# Patient Record
Sex: MALE | NOT HISPANIC OR LATINO | Employment: FULL TIME | URBAN - METROPOLITAN AREA
[De-identification: names, ages, dates, MRNs, and addresses within clinical notes are randomized per-mention and may not be internally consistent; named-entity substitution may affect disease eponyms.]

---

## 2017-03-29 ENCOUNTER — OFFICE VISIT (OUTPATIENT)
Dept: FAMILY MEDICINE CLINIC | Facility: CLINIC | Age: 42
End: 2017-03-29

## 2017-03-29 VITALS
WEIGHT: 250 LBS | RESPIRATION RATE: 16 BRPM | BODY MASS INDEX: 41.65 KG/M2 | DIASTOLIC BLOOD PRESSURE: 90 MMHG | OXYGEN SATURATION: 93 % | SYSTOLIC BLOOD PRESSURE: 138 MMHG | TEMPERATURE: 97.4 F | HEART RATE: 85 BPM | HEIGHT: 65 IN

## 2017-03-29 DIAGNOSIS — M54.2 NECK PAIN: Primary | ICD-10-CM

## 2017-03-29 DIAGNOSIS — M89.8X1 PAIN IN SCAPULA: ICD-10-CM

## 2017-03-29 DIAGNOSIS — K21.9 GASTROESOPHAGEAL REFLUX DISEASE WITHOUT ESOPHAGITIS: ICD-10-CM

## 2017-03-29 PROCEDURE — 72050 X-RAY EXAM NECK SPINE 4/5VWS: CPT | Performed by: FAMILY MEDICINE

## 2017-03-29 PROCEDURE — 99214 OFFICE O/P EST MOD 30 MIN: CPT | Performed by: FAMILY MEDICINE

## 2017-03-29 RX ORDER — CYCLOBENZAPRINE HCL 10 MG
10 TABLET ORAL 3 TIMES DAILY PRN
Qty: 30 TABLET | Refills: 0 | Status: SHIPPED | OUTPATIENT
Start: 2017-03-29 | End: 2017-06-15

## 2017-03-29 RX ORDER — OMEPRAZOLE 40 MG/1
40 CAPSULE, DELAYED RELEASE ORAL DAILY
Qty: 90 CAPSULE | Refills: 1 | Status: SHIPPED | OUTPATIENT
Start: 2017-03-29 | End: 2017-08-16 | Stop reason: SDUPTHER

## 2017-03-29 RX ORDER — MELOXICAM 15 MG/1
15 TABLET ORAL DAILY
Qty: 30 TABLET | Refills: 0 | Status: SHIPPED | OUTPATIENT
Start: 2017-03-29 | End: 2017-06-15

## 2017-03-29 NOTE — PROGRESS NOTES
"Subjective   Jamie Suárez is a 41 y.o. male.     History of Present Illness   Chief Complaint:   Chief Complaint   Patient presents with   • Neck Pain       Jamie Suárez 41 y.o. male who presents today for neck pain that has been present for the past 5 days. Patient stated his pain is a 10 out of 10. Patient denies injury. He says the pain radiates down his left arm. He was seen at the [facility] 5 days ago and he was told it was a nerve in his elbow and prescribed a steroid pack. He finished the steroid pack this morning. Not much help. Pain more left scapula and going down left arm to elbow.  No tenderness left elbow, it is at left scapula.   he has a history of   Patient Active Problem List   Diagnosis   • Disorder of rotator cuff   • Shoulder pain   .  Since the last visit, he has overall felt well.  he has been compliant with   Current Outpatient Prescriptions:   •  amoxicillin (AMOXIL) 875 MG tablet, Take 1 tablet by mouth 2 (two) times a day. (Patient not taking: Reported on 3/24/2017), Disp: 20 tablet, Rfl: 0  •  Codeine Polt-Chlorphen Polt ER (TUZISTRA XR) 14.7-2.8 MG/5ML Suspension Extended Release, Take 10 mL by mouth 2 (two) times a day. (Patient not taking: Reported on 3/24/2017), Disp: 180 mL, Rfl: 0  •  fexofenadine (ALLEGRA) 180 MG tablet, Take 1 tablet by mouth daily. (Patient not taking: Reported on 3/24/2017), Disp: 30 tablet, Rfl: 0  •  MethylPREDNISolone (MEDROL) 4 MG tablet, Take as directed on package instructions., Disp: 21 tablet, Rfl: 0  •  omeprazole (PriLOSEC) 40 MG capsule, TAKE ONE CAPSULE BY MOUTH EVERY DAY, Disp: 30 capsule, Rfl: 0.  he denies medication side effects.    All of the chronic condition(s) listed above are stable w/o issues.    /90  Pulse 85  Temp 97.4 °F (36.3 °C) (Oral)   Resp 16  Ht 65\" (165.1 cm)  Wt 250 lb (113 kg)  SpO2 93%  BMI 41.6 kg/m2    No results found for this or any previous visit.      The following portions of the patient's history were " reviewed and updated as appropriate: allergies, current medications, past family history, past medical history, past social history, past surgical history and problem list.    Review of Systems   Constitutional: Negative for activity change, appetite change and unexpected weight change.   Eyes: Negative for visual disturbance.   Respiratory: Negative for chest tightness and shortness of breath.    Cardiovascular: Negative for chest pain and palpitations.   Musculoskeletal: Positive for neck pain.   Skin: Negative for color change.   Neurological: Negative for syncope and speech difficulty.   Psychiatric/Behavioral: Negative for confusion and decreased concentration.       Objective   Physical Exam   Constitutional: He is oriented to person, place, and time. He appears well-developed and well-nourished.   HENT:   Head: Atraumatic.   Mouth/Throat: Oropharynx is clear and moist.   Eyes: EOM are normal. Pupils are equal, round, and reactive to light.   Neck: Normal range of motion. Neck supple. No thyromegaly present.    Tender left scapula   Cardiovascular: Normal rate and regular rhythm.    Pulmonary/Chest: Effort normal and breath sounds normal.   Abdominal: Soft.   Musculoskeletal: Normal range of motion.   Neurological: He is alert and oriented to person, place, and time.   Skin: Skin is warm and dry.   Psychiatric: He has a normal mood and affect. His behavior is normal.   Nursing note and vitals reviewed.    Views: lateral    Relevant Clinical Issues/Diagnoses/Indications for XRay: see HPI  Clinical Findings: C-spine: Normal    Compared with previous XRay? No comparison or previous xray was looked at today    Date of Previous Xray:n/a    Changes on current Xray? No comparison or previous xray was looked at today    Assessment/Plan   Jamie was seen today for neck pain.    Diagnoses and all orders for this visit:    Neck pain  -     cyclobenzaprine (FLEXERIL) 10 MG tablet; Take 1 tablet by mouth 3 (Three) Times a  Day As Needed for Muscle Spasms.  -     XR Spine Cervical Complete 4 or 5 View (In Office)    Pain in scapula    Other orders  -     meloxicam (MOBIC) 15 MG tablet; Take 1 tablet by mouth Daily.

## 2017-04-05 ENCOUNTER — OFFICE VISIT (OUTPATIENT)
Dept: FAMILY MEDICINE CLINIC | Facility: CLINIC | Age: 42
End: 2017-04-05

## 2017-04-05 VITALS
WEIGHT: 250 LBS | HEART RATE: 101 BPM | SYSTOLIC BLOOD PRESSURE: 130 MMHG | HEIGHT: 65 IN | TEMPERATURE: 97.8 F | OXYGEN SATURATION: 98 % | RESPIRATION RATE: 16 BRPM | DIASTOLIC BLOOD PRESSURE: 86 MMHG | BODY MASS INDEX: 41.65 KG/M2

## 2017-04-05 DIAGNOSIS — M25.512 ACUTE PAIN OF LEFT SHOULDER: Primary | ICD-10-CM

## 2017-04-05 PROCEDURE — 99214 OFFICE O/P EST MOD 30 MIN: CPT | Performed by: FAMILY MEDICINE

## 2017-04-05 RX ORDER — HYDROCODONE BITARTRATE AND ACETAMINOPHEN 5; 325 MG/1; MG/1
TABLET ORAL
Refills: 0 | COMMUNITY
Start: 2017-03-31 | End: 2017-06-15

## 2017-04-05 RX ORDER — PREDNISONE 50 MG/1
50 TABLET ORAL DAILY
COMMUNITY
End: 2017-06-15

## 2017-04-05 RX ORDER — DIAZEPAM 5 MG/1
TABLET ORAL
Refills: 0 | COMMUNITY
Start: 2017-03-31 | End: 2017-06-15

## 2017-04-05 NOTE — PATIENT INSTRUCTIONS
Exercise 30 minutes most days of the week  Sleep 6-8 hours each night if possible  Low fat, low cholesterol diet   we discussed prescribed medications and how to take them   make sure you get results of any labs/studies ordered today  Low glycemic index diet  GET IN TO SEE PT   SEE NEUROSURGEON IN AM   SEEING DR TOBI DODD DOWNWJULIO IN AM

## 2017-04-05 NOTE — PROGRESS NOTES
"Subjective   Jamie Suárez is a 41 y.o. male.     History of Present Illness   Chief Complaint:   Chief Complaint   Patient presents with   • Shoulder Pain       Jamie Suárez 41 y.o. male who presents today following up for shoulder pain that radiates to his left elbow. His pain today is a 6 out of 10. He went to [place] [facility] on 04/02/2017 for this issue because the pain became unbearable. I reviewed all notes, labs and test results. He went to PT and was informed they did not take his insurance. He is going to call his insurance to find what office will accept his insurance. His blood pressure is elevated in the office: 154/105 and 150/103. See new bp    130/86. In er got valium, hydrocodone, prednisone. Pain really left scapula.   he has a history of   Patient Active Problem List   Diagnosis   • Disorder of rotator cuff   • Shoulder pain   • GERD (gastroesophageal reflux disease)   .  Since the last visit, he has overall felt well.  he has been compliant with   Current Outpatient Prescriptions:   •  cyclobenzaprine (FLEXERIL) 10 MG tablet, Take 1 tablet by mouth 3 (Three) Times a Day As Needed for Muscle Spasms., Disp: 30 tablet, Rfl: 0  •  diazePAM (VALIUM) 5 MG tablet, TAKE 1 TABLET BY MOUTH EVERY 6 HOURS AS NEEDED MUSCLE SPASMS, Disp: , Rfl: 0  •  HYDROcodone-acetaminophen (NORCO) 5-325 MG per tablet, TAKE 1 TABLET BY MOUTH EVERY 6 HOURS AS NEEDED FOR PAIN, Disp: , Rfl: 0  •  meloxicam (MOBIC) 15 MG tablet, Take 1 tablet by mouth Daily., Disp: 30 tablet, Rfl: 0  •  omeprazole (priLOSEC) 40 MG capsule, Take 1 capsule by mouth Daily., Disp: 90 capsule, Rfl: 1.  he denies medication side effects.    All of the chronic condition(s) listed above are stable w/o issues.    BP (!) 154/105  Pulse 101  Temp 97.8 °F (36.6 °C) (Oral)   Resp 16  Ht 65\" (165.1 cm)  Wt 250 lb (113 kg)  SpO2 98%  BMI 41.6 kg/m2    No results found for this or any previous visit.      The following portions of the patient's history were " reviewed and updated as appropriate: allergies, current medications, past family history, past medical history, past social history, past surgical history and problem list.    Review of Systems   Constitutional: Negative for activity change, appetite change and unexpected weight change.   Eyes: Negative for visual disturbance.   Respiratory: Negative for chest tightness and shortness of breath.    Cardiovascular: Negative for chest pain and palpitations.   Musculoskeletal:        Left shoulder pain   Skin: Negative for color change.   Neurological: Negative for syncope and speech difficulty.   Psychiatric/Behavioral: Negative for confusion and decreased concentration.       Objective   Physical Exam   Constitutional: He appears well-developed and well-nourished.   HENT:   Head: Normocephalic.   Eyes: Pupils are equal, round, and reactive to light.   Neck: Normal range of motion.   Cardiovascular: Normal rate, regular rhythm, normal heart sounds and intact distal pulses.    Pulmonary/Chest: Breath sounds normal.   Musculoskeletal:    Left shoulder  FROM   PAIN ALL LEFT SCAPULA. GOOD    Neurological: He is alert.   Psychiatric: He has a normal mood and affect. His behavior is normal.   Nursing note and vitals reviewed.      Assessment/Plan   Jamie was seen today for shoulder pain.    Diagnoses and all orders for this visit:    Acute pain of left shoulder

## 2017-06-15 ENCOUNTER — OFFICE VISIT (OUTPATIENT)
Dept: RETAIL CLINIC | Facility: CLINIC | Age: 42
End: 2017-06-15

## 2017-06-15 VITALS — HEART RATE: 89 BPM | TEMPERATURE: 98 F | OXYGEN SATURATION: 98 % | RESPIRATION RATE: 20 BRPM

## 2017-06-15 DIAGNOSIS — R21 RASH: Primary | ICD-10-CM

## 2017-06-15 PROCEDURE — 99213 OFFICE O/P EST LOW 20 MIN: CPT | Performed by: NURSE PRACTITIONER

## 2017-06-15 RX ORDER — PREDNISONE 20 MG/1
20 TABLET ORAL 2 TIMES DAILY
Qty: 10 TABLET | Refills: 0 | Status: SHIPPED | OUTPATIENT
Start: 2017-06-15 | End: 2017-10-31

## 2017-06-15 NOTE — PROGRESS NOTES
Subjective:     Jamie Suárez is a 41 y.o.     Insect Bite   This is a new problem. The current episode started in the past 7 days (went to sleep and woke up with rash on right arm 3 days ago). The problem has been unchanged. Pertinent negatives include no chills, congestion, coughing, fatigue, fever, headaches, joint swelling, myalgias, nausea, sore throat or vomiting. Rash: right arm, woke up with it 3 days ago, does not itch and has not spread or chenged in any way. He has tried nothing for the symptoms.         The following portions of the patient's history were reviewed and updated as appropriate: allergies, current medications, past family history, past medical history, past social history, past surgical history and problem list.      Review of Systems   Constitutional: Negative for chills, fatigue and fever.   HENT: Negative for congestion, postnasal drip, rhinorrhea and sore throat.    Respiratory: Negative for cough, shortness of breath and wheezing.    Cardiovascular: Negative.    Gastrointestinal: Negative for diarrhea, nausea and vomiting.   Musculoskeletal: Negative for joint swelling and myalgias.   Skin: Negative for pallor. Rash: right arm, woke up with it 3 days ago, does not itch and has not spread or chenged in any way.   Neurological: Negative for dizziness, light-headedness and headaches.         Objective:      Physical Exam   Constitutional: He is oriented to person, place, and time. He appears well-developed and well-nourished. He is cooperative.   HENT:   Head: Normocephalic and atraumatic.   Nose: No rhinorrhea.   Eyes: Conjunctivae, EOM and lids are normal. Pupils are equal, round, and reactive to light.   Neck: Normal range of motion. Neck supple.   Cardiovascular: Normal rate, regular rhythm, S1 normal, S2 normal and normal heart sounds.    Pulmonary/Chest: Effort normal and breath sounds normal.   Abdominal: Soft. Bowel sounds are normal. There is no tenderness.   Musculoskeletal:  Normal range of motion.   Lymphadenopathy:     He has no cervical adenopathy.   Neurological: He is alert and oriented to person, place, and time.   Skin: Skin is warm and dry. Rash (right upper extremitiy, linear ) noted. Rash is maculopapular.   Psychiatric: He has a normal mood and affect. His behavior is normal.   Vitals reviewed.          Jamie was seen today for insect bite.    Diagnoses and all orders for this visit:    Rash    Other orders  -     predniSONE (DELTASONE) 20 MG tablet; Take 1 tablet by mouth 2 (Two) Times a Day.

## 2017-06-15 NOTE — PATIENT INSTRUCTIONS
Insect Bite  Mosquitoes, flies, fleas, bedbugs, and many other insects can bite. Insect bites are different from insect stings. A sting is when poison (venom) is injected into the skin. Insect bites can cause pain or itching for a few days, but they are usually not serious. Some insects can spread diseases to people through a bite.  SYMPTOMS   Symptoms of an insect bite include:  · Itching or pain in the bite area.  · Redness and swelling in the bite area.  · An open wound (skin ulcer).  In many cases, symptoms last for 2-4 days.   DIAGNOSIS   This condition is usually diagnosed based on symptoms and a physical exam.  TREATMENT   Treatment is usually not needed for an insect bite. Symptoms often go away on their own. Your health care provider may recommend creams or lotions to help reduce itching. Antibiotic medicines may be prescribed if the bite becomes infected. A tetanus shot may be given in some cases. If you develop an allergic reaction to an insect bite, your health care provider will prescribe medicines to treat the reaction (antihistamines). This is rare.  HOME CARE INSTRUCTIONS  · Do not scratch the bite area.  · Keep the bite area clean and dry. Wash the bite area daily with soap and water as told by your health care provider.  · If directed, apply ice to the bite area.    Put ice in a plastic bag.    Place a towel between your skin and the bag.    Leave the ice on for 20 minutes, 2-3 times per day.  · To help reduce itching and swelling, try applying a baking soda paste, cortisone cream, or calamine lotion to the bite area as told by your health care provider.  · Apply or take over-the-counter and prescription medicines only as told by your health care provider.  · If you were prescribed an antibiotic medicine, use it as told by your health care provider. Do not stop using the antibiotic even if your condition improves.  · Keep all follow-up visits as told by your health care provider. This is  important.  PREVENTION   · Use insect repellent. The best insect repellents contain:    DEET, picaridin, oil of lemon eucalyptus (OLE), or DT1335.    Higher amounts of an active ingredient.  · When you are outdoors, wear clothing that covers your arms and legs.  · Avoid opening windows that do not have window screens.  SEEK MEDICAL CARE IF:  · You have increased redness, swelling, or pain in the bite area.  · You have a fever.  SEEK IMMEDIATE MEDICAL CARE IF:   · You have joint pain.    · You have fluid, blood, or pus coming from the bite area.  · You have a headache or neck pain.  · You have unusual weakness.  · You have a rash.  · You have chest pain or shortness of breath.  · You have abdominal pain, nausea, or vomiting.  · You feel unusually tired or sleepy.     This information is not intended to replace advice given to you by your health care provider. Make sure you discuss any questions you have with your health care provider.     Document Released: 01/25/2006 Document Revised: 04/10/2017 Document Reviewed: 05/04/2016  [employer] Interactive Patient Education ©2017 [employer]

## 2017-08-16 RX ORDER — OMEPRAZOLE 40 MG/1
CAPSULE, DELAYED RELEASE ORAL
Qty: 30 CAPSULE | Refills: 0 | Status: SHIPPED | OUTPATIENT
Start: 2017-08-16 | End: 2017-09-16 | Stop reason: SDUPTHER

## 2017-09-18 RX ORDER — OMEPRAZOLE 40 MG/1
CAPSULE, DELAYED RELEASE ORAL
Qty: 15 CAPSULE | Refills: 0 | Status: SHIPPED | OUTPATIENT
Start: 2017-09-18 | End: 2017-10-31 | Stop reason: SDUPTHER

## 2017-10-06 ENCOUNTER — APPOINTMENT (OUTPATIENT)
Dept: GENERAL RADIOLOGY | Facility: HOSPITAL | Age: 42
End: 2017-10-06

## 2017-10-06 PROCEDURE — 71020 XR CHEST 2 VW: CPT | Performed by: FAMILY MEDICINE

## 2017-10-06 PROCEDURE — 71020 HC CHEST PA AND LATERAL: CPT | Performed by: FAMILY MEDICINE

## 2017-10-30 RX ORDER — OMEPRAZOLE 40 MG/1
CAPSULE, DELAYED RELEASE ORAL
Qty: 30 CAPSULE | Refills: 0 | OUTPATIENT
Start: 2017-10-30

## 2017-10-31 ENCOUNTER — OFFICE VISIT (OUTPATIENT)
Dept: FAMILY MEDICINE CLINIC | Facility: CLINIC | Age: 42
End: 2017-10-31

## 2017-10-31 VITALS
WEIGHT: 256 LBS | BODY MASS INDEX: 41.14 KG/M2 | DIASTOLIC BLOOD PRESSURE: 90 MMHG | HEIGHT: 66 IN | TEMPERATURE: 97.4 F | SYSTOLIC BLOOD PRESSURE: 120 MMHG | RESPIRATION RATE: 16 BRPM | HEART RATE: 90 BPM | OXYGEN SATURATION: 98 %

## 2017-10-31 DIAGNOSIS — IMO0001 CLASS 3 OBESITY DUE TO EXCESS CALORIES WITHOUT SERIOUS COMORBIDITY WITH BODY MASS INDEX (BMI) OF 40.0 TO 44.9 IN ADULT: ICD-10-CM

## 2017-10-31 DIAGNOSIS — K21.9 GASTROESOPHAGEAL REFLUX DISEASE WITHOUT ESOPHAGITIS: Primary | ICD-10-CM

## 2017-10-31 PROBLEM — M54.12 LEFT CERVICAL RADICULOPATHY: Status: ACTIVE | Noted: 2017-04-14

## 2017-10-31 PROBLEM — M48.02 CERVICAL STENOSIS OF SPINAL CANAL: Status: ACTIVE | Noted: 2017-04-14

## 2017-10-31 PROCEDURE — 99214 OFFICE O/P EST MOD 30 MIN: CPT | Performed by: FAMILY MEDICINE

## 2017-10-31 RX ORDER — OMEPRAZOLE 40 MG/1
40 CAPSULE, DELAYED RELEASE ORAL DAILY
Qty: 90 CAPSULE | Refills: 1 | Status: SHIPPED | OUTPATIENT
Start: 2017-10-31 | End: 2018-05-14 | Stop reason: SDUPTHER

## 2017-10-31 NOTE — PROGRESS NOTES
"Subjective   Jamie Suárez is a 42 y.o. male.     History of Present Illness   Chief Complaint:   Chief Complaint   Patient presents with   • Heartburn       Jamie Suárez 42 y.o. male who presents today for Medical Management of the below listed issues and medication refills. He has gerd and takes  prilosec 40mg q day. For 5 years and discussed need for gi consult for upper endoscopy. Discussed weight loss and he wants to try contrave. Discussed weight loss with exercise.  See bp.  he has a problem list of   Patient Active Problem List   Diagnosis   • Disorder of rotator cuff   • Shoulder pain   • GERD (gastroesophageal reflux disease)   .  Since the last visit, he has overall felt well.  he has been compliant with   Current Outpatient Prescriptions:   •  albuterol (PROVENTIL HFA;VENTOLIN HFA) 108 (90 Base) MCG/ACT inhaler, Inhale 2 puffs Every 6 (Six) Hours As Needed for Wheezing., Disp: 6.7 g, Rfl: 0  •  omeprazole (priLOSEC) 40 MG capsule, TAKE ONE CAPSULE EVERY DAY, Disp: 15 capsule, Rfl: 0.  he denies medication side effects.    All of the chronic condition(s) listed above are stable w/o issues.    /91  Pulse 90  Temp 97.4 °F (36.3 °C) (Oral)   Resp 16  Ht 66\" (167.6 cm)  Wt 256 lb (116 kg)  SpO2 98%  BMI 41.32 kg/m2    No results found for this or any previous visit.    The following portions of the patient's history were reviewed and updated as appropriate: allergies, current medications, past family history, past medical history, past social history, past surgical history and problem list.    Review of Systems   Constitutional: Negative for activity change, appetite change and unexpected weight change.   Eyes: Negative for visual disturbance.   Respiratory: Negative for chest tightness and shortness of breath.    Cardiovascular: Negative for chest pain and palpitations.   Skin: Negative for color change.   Neurological: Negative for syncope and speech difficulty.   Psychiatric/Behavioral: " Negative for confusion and decreased concentration.       Objective   Physical Exam   Constitutional: He is oriented to person, place, and time. He appears well-developed and well-nourished.   HENT:   Head: Atraumatic.   Mouth/Throat: Oropharynx is clear and moist.   Eyes: EOM are normal. Pupils are equal, round, and reactive to light.   Neck: Normal range of motion. Neck supple. No thyromegaly present.   Cardiovascular: Normal rate and regular rhythm.    Pulmonary/Chest: Effort normal and breath sounds normal.   Abdominal: Soft.   Musculoskeletal: Normal range of motion.   Neurological: He is alert and oriented to person, place, and time.   Skin: Skin is warm and dry.   Psychiatric: He has a normal mood and affect. His behavior is normal.   Nursing note and vitals reviewed.      Assessment/Plan   Jamie was seen today for heartburn.    Diagnoses and all orders for this visit:    Gastroesophageal reflux disease without esophagitis  -     omeprazole (priLOSEC) 40 MG capsule; Take 1 capsule by mouth Daily.  -     Comprehensive metabolic panel  -     Lipid panel  -     CBC and Differential  -     TSH    Class 3 obesity due to excess calories without serious comorbidity with body mass index (BMI) of 40.0 to 44.9 in adult  -     Comprehensive metabolic panel  -     Lipid panel  -     CBC and Differential  -     TSH    Other orders  -     naltrexone-bupropion ER (CONTRAVE) 8-90 MG tablet; Wk 1: 1 tab daily, Wk 2: 1 tab twice a day, Wk 3: 2 tabs in AM, 1 tab in PM, Wk 4: 2 tabs twice a day, Maintenance dose: 2 tabs twice daily.

## 2017-11-01 LAB
ALBUMIN SERPL-MCNC: 4.3 G/DL (ref 3.5–5.2)
ALBUMIN/GLOB SERPL: 1.3 G/DL
ALP SERPL-CCNC: 62 U/L (ref 39–117)
ALT SERPL-CCNC: 55 U/L (ref 1–41)
AST SERPL-CCNC: 33 U/L (ref 1–40)
BASOPHILS # BLD AUTO: 0.04 10*3/MM3 (ref 0–0.2)
BASOPHILS NFR BLD AUTO: 0.4 % (ref 0–1.5)
BILIRUB SERPL-MCNC: 0.5 MG/DL (ref 0.1–1.2)
BUN SERPL-MCNC: 14 MG/DL (ref 6–20)
BUN/CREAT SERPL: 13.1 (ref 7–25)
CALCIUM SERPL-MCNC: 9.5 MG/DL (ref 8.6–10.5)
CHLORIDE SERPL-SCNC: 101 MMOL/L (ref 98–107)
CHOLEST SERPL-MCNC: 196 MG/DL (ref 0–200)
CO2 SERPL-SCNC: 27.5 MMOL/L (ref 22–29)
CREAT SERPL-MCNC: 1.07 MG/DL (ref 0.76–1.27)
EOSINOPHIL # BLD AUTO: 0.18 10*3/MM3 (ref 0–0.7)
EOSINOPHIL NFR BLD AUTO: 1.9 % (ref 0.3–6.2)
ERYTHROCYTE [DISTWIDTH] IN BLOOD BY AUTOMATED COUNT: 14 % (ref 11.5–14.5)
GFR SERPLBLD CREATININE-BSD FMLA CKD-EPI: 75 ML/MIN/1.73
GFR SERPLBLD CREATININE-BSD FMLA CKD-EPI: 91 ML/MIN/1.73
GLOBULIN SER CALC-MCNC: 3.3 GM/DL
GLUCOSE SERPL-MCNC: 109 MG/DL (ref 65–99)
HCT VFR BLD AUTO: 53.1 % (ref 40.4–52.2)
HDLC SERPL-MCNC: 45 MG/DL (ref 40–60)
HGB BLD-MCNC: 18 G/DL (ref 13.7–17.6)
IMM GRANULOCYTES # BLD: 0.04 10*3/MM3 (ref 0–0.03)
IMM GRANULOCYTES NFR BLD: 0.4 % (ref 0–0.5)
LDLC SERPL CALC-MCNC: 117 MG/DL (ref 0–100)
LYMPHOCYTES # BLD AUTO: 3.07 10*3/MM3 (ref 0.9–4.8)
LYMPHOCYTES NFR BLD AUTO: 31.6 % (ref 19.6–45.3)
MCH RBC QN AUTO: 32.4 PG (ref 27–32.7)
MCHC RBC AUTO-ENTMCNC: 33.9 G/DL (ref 32.6–36.4)
MCV RBC AUTO: 95.7 FL (ref 79.8–96.2)
MONOCYTES # BLD AUTO: 0.9 10*3/MM3 (ref 0.2–1.2)
MONOCYTES NFR BLD AUTO: 9.3 % (ref 5–12)
NEUTROPHILS # BLD AUTO: 5.48 10*3/MM3 (ref 1.9–8.1)
NEUTROPHILS NFR BLD AUTO: 56.4 % (ref 42.7–76)
PLATELET # BLD AUTO: 253 10*3/MM3 (ref 140–500)
POTASSIUM SERPL-SCNC: 4.5 MMOL/L (ref 3.5–5.2)
PROT SERPL-MCNC: 7.6 G/DL (ref 6–8.5)
RBC # BLD AUTO: 5.55 10*6/MM3 (ref 4.6–6)
SODIUM SERPL-SCNC: 141 MMOL/L (ref 136–145)
TRIGL SERPL-MCNC: 172 MG/DL (ref 0–150)
TSH SERPL DL<=0.005 MIU/L-ACNC: 1.66 MIU/ML (ref 0.27–4.2)
VLDLC SERPL CALC-MCNC: 34.4 MG/DL (ref 5–40)
WBC # BLD AUTO: 9.71 10*3/MM3 (ref 4.5–10.7)

## 2018-03-02 ENCOUNTER — APPOINTMENT (OUTPATIENT)
Dept: GENERAL RADIOLOGY | Facility: HOSPITAL | Age: 43
End: 2018-03-02

## 2018-03-02 PROCEDURE — 71046 X-RAY EXAM CHEST 2 VIEWS: CPT | Performed by: EMERGENCY MEDICINE

## 2018-03-05 ENCOUNTER — OFFICE VISIT (OUTPATIENT)
Dept: FAMILY MEDICINE CLINIC | Facility: CLINIC | Age: 43
End: 2018-03-05

## 2018-03-05 VITALS
TEMPERATURE: 98.1 F | OXYGEN SATURATION: 98 % | RESPIRATION RATE: 16 BRPM | HEIGHT: 66 IN | WEIGHT: 248 LBS | BODY MASS INDEX: 39.86 KG/M2 | DIASTOLIC BLOOD PRESSURE: 102 MMHG | SYSTOLIC BLOOD PRESSURE: 142 MMHG | HEART RATE: 114 BPM

## 2018-03-05 DIAGNOSIS — J18.9 PNEUMONIA OF RIGHT MIDDLE LOBE DUE TO INFECTIOUS ORGANISM: Primary | ICD-10-CM

## 2018-03-05 DIAGNOSIS — B34.9 VIRAL SYNDROME: ICD-10-CM

## 2018-03-05 PROCEDURE — 99214 OFFICE O/P EST MOD 30 MIN: CPT | Performed by: FAMILY MEDICINE

## 2018-03-05 NOTE — PROGRESS NOTES
"Subjective   Jamie Suárez is a 42 y.o. male.     History of Present Illness   Chief Complaint:   Chief Complaint   Patient presents with   • Pneumonia     Urgent care follow up       Jamie Suárez 42 y.o. male who presents today for an Urgent care follow up on 03/02/2018 for Pneumonia of right middle lobe due to infectious organism and acute bronchospasms.  I reviewed all labs, tests and notes. Patient is currently taking Levofloxacin 500 mg.  cxr 2/2018 showed pnuemonia right and cxr  Clear 3/2/2018  Still feels congested and on levaquin.  Feels aches all over, finish levaquin.  See me Friday.  Off work until 3/13/2018/  .  he has a problem list of   Patient Active Problem List   Diagnosis   • Disorder of rotator cuff   • Shoulder pain   • GERD (gastroesophageal reflux disease)   • Cervical stenosis of spinal canal   • Left cervical radiculopathy   .  Since the last visit, he has overall felt well.  he has been compliant with   Current Outpatient Prescriptions:   •  albuterol (PROVENTIL HFA;VENTOLIN HFA) 108 (90 Base) MCG/ACT inhaler, Inhale 2 puffs Every 6 (Six) Hours As Needed for Wheezing., Disp: 1 inhaler, Rfl: 0  •  levoFLOXacin (LEVAQUIN) 500 MG tablet, Take 1 tablet by mouth Daily., Disp: 10 tablet, Rfl: 0  •  omeprazole (priLOSEC) 40 MG capsule, Take 1 capsule by mouth Daily., Disp: 90 capsule, Rfl: 1  •  promethazine-dextromethorphan (PROMETHAZINE-DM) 6.25-15 MG/5ML syrup, Take 5 mL by mouth 4 (Four) Times a Day As Needed for Cough., Disp: 100 mL, Rfl: 0.  he denies medication side effects.    All of the chronic condition(s) listed above are stable w/o issues.    BP (!) 142/102  Pulse 114  Temp 98.1 °F (36.7 °C) (Oral)   Resp 16  Ht 167.6 cm (66\")  Wt 112 kg (248 lb)  SpO2 98%  BMI 40.03 kg/m2    The following portions of the patient's history were reviewed and updated as appropriate: allergies, current medications, past family history, past medical history, past social history, past surgical " history and problem list.    Review of Systems   Constitutional: Negative for activity change, appetite change, chills, fatigue, fever and unexpected weight change.   HENT: Positive for congestion, sinus pain and sinus pressure. Negative for ear pain, hearing loss, mouth sores, nosebleeds, rhinorrhea and sore throat.    Eyes: Negative for pain and visual disturbance.   Respiratory: Positive for cough. Negative for shortness of breath and wheezing.    Cardiovascular: Negative for chest pain, palpitations and leg swelling.   Gastrointestinal: Negative for abdominal distention, abdominal pain, blood in stool, constipation, diarrhea, nausea and vomiting.   Endocrine: Negative for cold intolerance and heat intolerance.   Genitourinary: Negative for difficulty urinating, discharge, dysuria, frequency, hematuria and urgency.   Musculoskeletal: Negative for back pain and joint swelling.   Skin: Negative for rash and wound.   Neurological: Negative for dizziness, weakness, numbness and headaches.   Hematological: Does not bruise/bleed easily.   Psychiatric/Behavioral: Negative for confusion, dysphoric mood, sleep disturbance and suicidal ideas. The patient is not nervous/anxious.        Objective   Physical Exam   Constitutional: He is oriented to person, place, and time. He appears well-developed and well-nourished.   HENT:   Head: Normocephalic.   Right Ear: External ear normal.   Left Ear: External ear normal.   Nose: Nose normal.   Mouth/Throat: Oropharynx is clear and moist.   Eyes: EOM are normal. Pupils are equal, round, and reactive to light.   Neck: Normal range of motion. Neck supple.   Cardiovascular: Normal rate and regular rhythm.    Pulmonary/Chest: Effort normal. No respiratory distress. He has wheezes. He has no rales. He exhibits no tenderness.   Abdominal: Soft.   Musculoskeletal: Normal range of motion.   Lymphadenopathy:     He has no cervical adenopathy.   Neurological: He is oriented to person, place,  and time.   Skin: Skin is warm and dry.   Psychiatric: He has a normal mood and affect. His behavior is normal.   Nursing note and vitals reviewed.      Assessment/Plan   Jamie was seen today for pneumonia.    Diagnoses and all orders for this visit:    Pneumonia of right middle lobe due to infectious organism    Viral syndrome

## 2018-03-07 ENCOUNTER — TELEPHONE (OUTPATIENT)
Dept: FAMILY MEDICINE CLINIC | Facility: CLINIC | Age: 43
End: 2018-03-07

## 2018-03-09 ENCOUNTER — OFFICE VISIT (OUTPATIENT)
Dept: FAMILY MEDICINE CLINIC | Facility: CLINIC | Age: 43
End: 2018-03-09

## 2018-03-09 ENCOUNTER — DOCUMENTATION (OUTPATIENT)
Dept: FAMILY MEDICINE CLINIC | Facility: CLINIC | Age: 43
End: 2018-03-09

## 2018-03-09 VITALS
BODY MASS INDEX: 39.86 KG/M2 | HEIGHT: 66 IN | OXYGEN SATURATION: 98 % | TEMPERATURE: 97.3 F | SYSTOLIC BLOOD PRESSURE: 128 MMHG | WEIGHT: 248 LBS | RESPIRATION RATE: 16 BRPM | DIASTOLIC BLOOD PRESSURE: 77 MMHG | HEART RATE: 99 BPM

## 2018-03-09 DIAGNOSIS — J40 BRONCHITIS: Primary | ICD-10-CM

## 2018-03-09 DIAGNOSIS — R91.8 PULMONARY INFILTRATE IN RIGHT LUNG ON CHEST X-RAY: ICD-10-CM

## 2018-03-09 PROCEDURE — 99213 OFFICE O/P EST LOW 20 MIN: CPT | Performed by: FAMILY MEDICINE

## 2018-03-09 RX ORDER — FINASTERIDE 5 MG/1
TABLET, FILM COATED ORAL
COMMUNITY
Start: 2018-03-06 | End: 2018-12-18

## 2018-03-09 NOTE — PROGRESS NOTES
"Subjective   Jamie Suárez is a 42 y.o. male.     History of Present Illness   Chief Complaint:   Chief Complaint   Patient presents with   • Pneumonia     5 day follow up       Jamie Suárez 42 y.o. male who presents today for a 5 day follow up regarding pneumonia of right middle lobe due to infectious organism and acute bronchospasms. He has 2 more tablets of his Levaquin.  He still has congestion and is coughing. He stated he is feeling a little better. Much better   cxr cleared   Felling better. Pt is able to go back to on 3/17/18   Phenergan with codiene helped  Cough.   Return to work 3/17/18.  he has a problem list of   Patient Active Problem List   Diagnosis   • Disorder of rotator cuff   • Shoulder pain   • GERD (gastroesophageal reflux disease)   • Cervical stenosis of spinal canal   • Left cervical radiculopathy   .  Since the last visit, he has overall felt well.  he has been compliant with   Current Outpatient Prescriptions:   •  albuterol (PROVENTIL HFA;VENTOLIN HFA) 108 (90 Base) MCG/ACT inhaler, Inhale 2 puffs Every 6 (Six) Hours As Needed for Wheezing., Disp: 1 inhaler, Rfl: 0  •  finasteride (PROSCAR) 5 MG tablet, , Disp: , Rfl:   •  levoFLOXacin (LEVAQUIN) 500 MG tablet, Take 1 tablet by mouth Daily., Disp: 10 tablet, Rfl: 0  •  omeprazole (priLOSEC) 40 MG capsule, Take 1 capsule by mouth Daily., Disp: 90 capsule, Rfl: 1  •  promethazine-dextromethorphan (PROMETHAZINE-DM) 6.25-15 MG/5ML syrup, Take 5 mL by mouth 4 (Four) Times a Day As Needed for Cough., Disp: 100 mL, Rfl: 0.  he denies medication side effects.    All of the chronic condition(s) listed above are stable w/o issues.    /77  Pulse 99  Temp 97.3 °F (36.3 °C) (Oral)   Resp 16  Ht 167.6 cm (66\")  Wt 112 kg (248 lb)  SpO2 98%  BMI 40.03 kg/m2      The following portions of the patient's history were reviewed and updated as appropriate: allergies, current medications, past family history, past medical history, past social " history, past surgical history and problem list.    Review of Systems   Constitutional: Negative for activity change, appetite change, chills, fatigue, fever and unexpected weight change.   HENT: Positive for congestion. Negative for ear pain, hearing loss, mouth sores, nosebleeds, rhinorrhea and sore throat.    Eyes: Negative for pain and visual disturbance.   Respiratory: Positive for cough. Negative for shortness of breath and wheezing.    Cardiovascular: Negative for chest pain, palpitations and leg swelling.   Gastrointestinal: Negative for abdominal distention, abdominal pain, blood in stool, constipation, diarrhea, nausea and vomiting.   Endocrine: Negative for cold intolerance and heat intolerance.   Genitourinary: Negative for difficulty urinating, discharge, dysuria, frequency, hematuria and urgency.   Musculoskeletal: Negative for back pain and joint swelling.   Skin: Negative for rash and wound.   Neurological: Negative for dizziness, weakness, numbness and headaches.   Hematological: Does not bruise/bleed easily.   Psychiatric/Behavioral: Negative for confusion, dysphoric mood, sleep disturbance and suicidal ideas. The patient is not nervous/anxious.        Objective   Physical Exam   Constitutional: He appears well-developed and well-nourished.   Eyes: Pupils are equal, round, and reactive to light.   Neck: Normal range of motion.   Cardiovascular: Normal rate, regular rhythm and normal heart sounds.    Pulmonary/Chest: Effort normal and breath sounds normal. No respiratory distress. He has no wheezes. He has no rales.   Lymphadenopathy:     He has no cervical adenopathy.   Neurological: He is alert.   Skin: Skin is warm and dry.   Psychiatric: He has a normal mood and affect.   Nursing note and vitals reviewed.      Assessment/Plan   Jamie was seen today for pneumonia.    Diagnoses and all orders for this visit:    Bronchitis    Pulmonary infiltrate in right lung on chest  x-ray  Comments:  cleared

## 2018-05-14 DIAGNOSIS — K21.9 GASTROESOPHAGEAL REFLUX DISEASE WITHOUT ESOPHAGITIS: ICD-10-CM

## 2018-05-14 RX ORDER — OMEPRAZOLE 40 MG/1
CAPSULE, DELAYED RELEASE ORAL
Qty: 90 CAPSULE | Refills: 1 | Status: SHIPPED | OUTPATIENT
Start: 2018-05-14 | End: 2018-10-08 | Stop reason: SDUPTHER

## 2018-10-08 DIAGNOSIS — K21.9 GASTROESOPHAGEAL REFLUX DISEASE WITHOUT ESOPHAGITIS: ICD-10-CM

## 2018-10-08 RX ORDER — OMEPRAZOLE 40 MG/1
CAPSULE, DELAYED RELEASE ORAL
Qty: 30 CAPSULE | Refills: 0 | Status: SHIPPED | OUTPATIENT
Start: 2018-10-08 | End: 2018-11-06 | Stop reason: SDUPTHER

## 2018-11-06 DIAGNOSIS — K21.9 GASTROESOPHAGEAL REFLUX DISEASE WITHOUT ESOPHAGITIS: ICD-10-CM

## 2018-11-06 RX ORDER — OMEPRAZOLE 40 MG/1
CAPSULE, DELAYED RELEASE ORAL
Qty: 30 CAPSULE | Refills: 0 | Status: SHIPPED | OUTPATIENT
Start: 2018-11-06 | End: 2018-12-18 | Stop reason: SDUPTHER

## 2018-11-07 DIAGNOSIS — K21.9 GASTROESOPHAGEAL REFLUX DISEASE WITHOUT ESOPHAGITIS: ICD-10-CM

## 2018-11-08 RX ORDER — OMEPRAZOLE 40 MG/1
CAPSULE, DELAYED RELEASE ORAL
Qty: 30 CAPSULE | Refills: 0 | OUTPATIENT
Start: 2018-11-08

## 2018-12-03 DIAGNOSIS — K21.9 GASTROESOPHAGEAL REFLUX DISEASE WITHOUT ESOPHAGITIS: ICD-10-CM

## 2018-12-03 RX ORDER — OMEPRAZOLE 40 MG/1
CAPSULE, DELAYED RELEASE ORAL
Qty: 30 CAPSULE | Refills: 0 | OUTPATIENT
Start: 2018-12-03

## 2018-12-15 ENCOUNTER — OFFICE VISIT (OUTPATIENT)
Dept: RETAIL CLINIC | Facility: CLINIC | Age: 43
End: 2018-12-15

## 2018-12-15 VITALS
BODY MASS INDEX: 43.71 KG/M2 | OXYGEN SATURATION: 97 % | HEART RATE: 106 BPM | SYSTOLIC BLOOD PRESSURE: 132 MMHG | HEIGHT: 66 IN | DIASTOLIC BLOOD PRESSURE: 82 MMHG | TEMPERATURE: 99.4 F | WEIGHT: 272 LBS

## 2018-12-15 DIAGNOSIS — J11.1 INFLUENZA: Primary | ICD-10-CM

## 2018-12-15 DIAGNOSIS — R05.9 COUGH: ICD-10-CM

## 2018-12-15 DIAGNOSIS — J18.9 PNEUMONIA OF RIGHT MIDDLE LOBE DUE TO INFECTIOUS ORGANISM: ICD-10-CM

## 2018-12-15 DIAGNOSIS — R50.9 FEVER, UNSPECIFIED FEVER CAUSE: ICD-10-CM

## 2018-12-15 DIAGNOSIS — R11.2 NAUSEA AND VOMITING, INTRACTABILITY OF VOMITING NOT SPECIFIED, UNSPECIFIED VOMITING TYPE: ICD-10-CM

## 2018-12-15 DIAGNOSIS — R06.2 WHEEZING ON EXPIRATION: ICD-10-CM

## 2018-12-15 LAB
EXPIRATION DATE: ABNORMAL
FLUAV AG NPH QL: POSITIVE
FLUBV AG NPH QL: NEGATIVE
INTERNAL CONTROL: ABNORMAL
Lab: ABNORMAL

## 2018-12-15 PROCEDURE — 99214 OFFICE O/P EST MOD 30 MIN: CPT | Performed by: NURSE PRACTITIONER

## 2018-12-15 PROCEDURE — 87804 INFLUENZA ASSAY W/OPTIC: CPT | Performed by: NURSE PRACTITIONER

## 2018-12-15 RX ORDER — GUAIFENESIN 600 MG/1
600 TABLET, EXTENDED RELEASE ORAL 2 TIMES DAILY
Qty: 20 TABLET | Refills: 0 | Status: SHIPPED | OUTPATIENT
Start: 2018-12-15 | End: 2018-12-25

## 2018-12-15 RX ORDER — OSELTAMIVIR PHOSPHATE 75 MG/1
75 CAPSULE ORAL 2 TIMES DAILY
Qty: 10 CAPSULE | Refills: 0 | Status: SHIPPED | OUTPATIENT
Start: 2018-12-15 | End: 2018-12-20

## 2018-12-15 RX ORDER — IPRATROPIUM BROMIDE AND ALBUTEROL SULFATE 2.5; .5 MG/3ML; MG/3ML
3 SOLUTION RESPIRATORY (INHALATION)
Status: DISCONTINUED | OUTPATIENT
Start: 2018-12-15 | End: 2019-01-04

## 2018-12-15 RX ORDER — DEXTROMETHORPHAN HYDROBROMIDE AND PROMETHAZINE HYDROCHLORIDE 15; 6.25 MG/5ML; MG/5ML
5 SYRUP ORAL 4 TIMES DAILY PRN
Qty: 180 ML | Refills: 0 | Status: SHIPPED | OUTPATIENT
Start: 2018-12-15 | End: 2018-12-18 | Stop reason: ALTCHOICE

## 2018-12-15 RX ORDER — ALBUTEROL SULFATE 90 UG/1
2 AEROSOL, METERED RESPIRATORY (INHALATION) EVERY 6 HOURS PRN
Qty: 1 INHALER | Refills: 0 | Status: SHIPPED | OUTPATIENT
Start: 2018-12-15 | End: 2020-01-28

## 2018-12-15 NOTE — PATIENT INSTRUCTIONS
"Influenza, Adult  Influenza (“the flu\") is an infection in the lungs, nose, and throat (respiratory tract). It is caused by a virus. The flu causes many common cold symptoms, as well as a high fever and body aches. It can make you feel very sick.  The flu spreads easily from person to person (is contagious). Getting a flu shot (influenza vaccination) every year is the best way to prevent the flu.  Follow these instructions at home:  · Take over-the-counter and prescription medicines only as told by your doctor.  · Use a cool mist humidifier to add moisture (humidity) to the air in your home. This can make it easier to breathe.  · Rest as needed.  · Drink enough fluid to keep your pee (urine) clear or pale yellow.  · Cover your mouth and nose when you cough or sneeze.  · Wash your hands with soap and water often, especially after you cough or sneeze. If you cannot use soap and water, use hand .  · Stay home from work or school as told by your doctor. Unless you are visiting your doctor, try to avoid leaving home until your fever has been gone for 24 hours without the use of medicine.  · Keep all follow-up visits as told by your doctor. This is important.  How is this prevented?  · Getting a yearly (annual) flu shot is the best way to avoid getting the flu. You may get the flu shot in late summer, fall, or winter. Ask your doctor when you should get your flu shot.  · Wash your hands often or use hand  often.  · Avoid contact with people who are sick during cold and flu season.  · Eat healthy foods.  · Drink plenty of fluids.  · Get enough sleep.  · Exercise regularly.  Contact a doctor if:  · You get new symptoms.  · You have:  ? Chest pain.  ? Watery poop (diarrhea).  ? A fever.  · Your cough gets worse.  · You start to have more mucus.  · You feel sick to your stomach (nauseous).  · You throw up (vomit).  Get help right away if:  · You start to be short of breath or have trouble breathing.  · Your " skin or nails turn a bluish color.  · You have very bad pain or stiffness in your neck.  · You get a sudden headache.  · You get sudden pain in your face or ear.  · You cannot stop throwing up.  This information is not intended to replace advice given to you by your health care provider. Make sure you discuss any questions you have with your health care provider.  Document Released: 09/26/2009 Document Revised: 05/25/2017 Document Reviewed: 10/11/2016  Elsevier Interactive Patient Education © 2017 [employer]

## 2018-12-15 NOTE — PROGRESS NOTES
"Maria Del Rosario Suárez is a 43 y.o. male.       /82 (BP Location: Left arm, Patient Position: Sitting, Cuff Size: Large Adult)   Pulse 106   Temp 99.4 °F (37.4 °C) (Oral)   Ht 167.6 cm (66\")   Wt 123 kg (272 lb)   SpO2 97%   BMI 43.90 kg/m²     Flu Symptoms   This is a new problem. The current episode started yesterday. The problem occurs constantly. The problem has been gradually worsening. Associated symptoms include chills, congestion, coughing, fatigue, a fever, headaches, myalgias, nausea, vomiting and weakness. Nothing aggravates the symptoms. He has tried drinking, lying down and rest (no flu vaccine this year. was dx URI , completed antibiotics 3 days ago. pt states that he felt much better after tx, then started with fever and cough yesterday, pt daughter , age 16 currently sick) for the symptoms. The treatment provided no relief.        The following portions of the patient's history were reviewed and updated as appropriate: current medications, past family history, past medical history, past social history, past surgical history and problem list.    Review of Systems   Constitutional: Positive for chills, fatigue and fever.   HENT: Positive for congestion.    Eyes: Negative.    Respiratory: Positive for cough.    Gastrointestinal: Positive for nausea and vomiting.   Endocrine: Negative.    Genitourinary: Negative.    Musculoskeletal: Positive for myalgias.   Skin: Negative.    Allergic/Immunologic: Positive for environmental allergies.   Neurological: Positive for weakness and headache.   Psychiatric/Behavioral: Negative.        Objective   Physical Exam   Constitutional: He is oriented to person, place, and time. He appears well-developed and well-nourished.   HENT:   Head: Normocephalic and atraumatic.   Right Ear: Hearing, tympanic membrane, external ear and ear canal normal.   Left Ear: Hearing, tympanic membrane, external ear and ear canal normal.   Nose: Rhinorrhea, sinus tenderness " and congestion present.   Mouth/Throat: Uvula is midline, oropharynx is clear and moist and mucous membranes are normal.   Eyes: Conjunctivae and EOM are normal. Pupils are equal, round, and reactive to light.   Neck: Normal range of motion.   Cardiovascular: Normal rate, regular rhythm and normal heart sounds.   Pulmonary/Chest: Effort normal. He has wheezes in the right middle field, the right lower field and the left lower field.   Abdominal: Soft. Bowel sounds are normal.   Musculoskeletal: Normal range of motion.   Neurological: He is alert and oriented to person, place, and time.   Skin: Skin is warm and dry. Capillary refill takes less than 2 seconds.   Psychiatric: He has a normal mood and affect.   Vitals reviewed.        Assessment/Plan   Jamie was seen today for flu symptoms.    Diagnoses and all orders for this visit:    Influenza  -     oseltamivir (TAMIFLU) 75 MG capsule; Take 1 capsule by mouth 2 (Two) Times a Day for 5 days.    Wheezing on expiration  -     Nebulizer Treatment; Future  -     ipratropium-albuterol (DUO-NEB) nebulizer solution 3 mL; Take 3 mL by nebulization 4 (Four) Times a Day.  -     albuterol 108 (90 Base) MCG/ACT inhaler; Inhale 2 puffs Every 6 (Six) Hours As Needed for Wheezing or Shortness of Air.    Cough  -     promethazine-dextromethorphan (PROMETHAZINE-DM) 6.25-15 MG/5ML syrup; Take 5 mL by mouth 4 (Four) Times a Day As Needed for Cough for up to 10 days.  -     guaiFENesin (MUCINEX) 600 MG 12 hr tablet; Take 1 tablet by mouth 2 (Two) Times a Day for 10 days.  -     POCT Influenza A/B    Fever, unspecified fever cause    Pneumonia of right middle lobe due to infectious organism (CMS/[facility])  -     albuterol 108 (90 Base) MCG/ACT inhaler; Inhale 2 puffs Every 6 (Six) Hours As Needed for Wheezing or Shortness of Air.    Nausea and vomiting, intractability of vomiting not specified, unspecified vomiting type      Flu A Positive   02 after neb tx 98 %  Influenza, Adult  Influenza  "(“the flu\") is an infection in the lungs, nose, and throat (respiratory tract). It is caused by a virus. The flu causes many common cold symptoms, as well as a high fever and body aches. It can make you feel very sick.  The flu spreads easily from person to person (is contagious). Getting a flu shot (influenza vaccination) every year is the best way to prevent the flu.  Follow these instructions at home:  · Take over-the-counter and prescription medicines only as told by your doctor.  · Use a cool mist humidifier to add moisture (humidity) to the air in your home. This can make it easier to breathe.  · Rest as needed.  · Drink enough fluid to keep your pee (urine) clear or pale yellow.  · Cover your mouth and nose when you cough or sneeze.  · Wash your hands with soap and water often, especially after you cough or sneeze. If you cannot use soap and water, use hand .  · Stay home from work or school as told by your doctor. Unless you are visiting your doctor, try to avoid leaving home until your fever has been gone for 24 hours without the use of medicine.  · Keep all follow-up visits as told by your doctor. This is important.  How is this prevented?  · Getting a yearly (annual) flu shot is the best way to avoid getting the flu. You may get the flu shot in late summer, fall, or winter. Ask your doctor when you should get your flu shot.  · Wash your hands often or use hand  often.  · Avoid contact with people who are sick during cold and flu season.  · Eat healthy foods.  · Drink plenty of fluids.  · Get enough sleep.  · Exercise regularly.  Contact a doctor if:  · You get new symptoms.  · You have:  ? Chest pain.  ? Watery poop (diarrhea).  ? A fever.  · Your cough gets worse.  · You start to have more mucus.  · You feel sick to your stomach (nauseous).  · You throw up (vomit).  Get help right away if:  · You start to be short of breath or have trouble breathing.  · Your skin or nails turn a bluish " color.  · You have very bad pain or stiffness in your neck.  · You get a sudden headache.  · You get sudden pain in your face or ear.  · You cannot stop throwing up.  This information is not intended to replace advice given to you by your health care provider. Make sure you discuss any questions you have with your health care provider.  Document Released: 09/26/2009 Document Revised: 05/25/2017 Document Reviewed: 10/11/2016  Elsevier Interactive Patient Education © 2017 [employer]

## 2018-12-17 DIAGNOSIS — K21.9 GASTROESOPHAGEAL REFLUX DISEASE WITHOUT ESOPHAGITIS: ICD-10-CM

## 2018-12-18 ENCOUNTER — OFFICE VISIT (OUTPATIENT)
Dept: FAMILY MEDICINE CLINIC | Facility: CLINIC | Age: 43
End: 2018-12-18

## 2018-12-18 VITALS
WEIGHT: 260 LBS | DIASTOLIC BLOOD PRESSURE: 91 MMHG | TEMPERATURE: 97.5 F | SYSTOLIC BLOOD PRESSURE: 140 MMHG | BODY MASS INDEX: 41.78 KG/M2 | RESPIRATION RATE: 18 BRPM | HEIGHT: 66 IN | HEART RATE: 102 BPM

## 2018-12-18 DIAGNOSIS — R06.02 SOB (SHORTNESS OF BREATH): ICD-10-CM

## 2018-12-18 DIAGNOSIS — K21.9 GASTROESOPHAGEAL REFLUX DISEASE WITHOUT ESOPHAGITIS: ICD-10-CM

## 2018-12-18 DIAGNOSIS — J10.1 INFLUENZA A: ICD-10-CM

## 2018-12-18 DIAGNOSIS — J06.9 URI, ACUTE: Primary | ICD-10-CM

## 2018-12-18 PROCEDURE — 99213 OFFICE O/P EST LOW 20 MIN: CPT | Performed by: FAMILY MEDICINE

## 2018-12-18 PROCEDURE — 71046 X-RAY EXAM CHEST 2 VIEWS: CPT | Performed by: FAMILY MEDICINE

## 2018-12-18 RX ORDER — LEVOFLOXACIN 750 MG/1
750 TABLET ORAL DAILY
Qty: 7 TABLET | Refills: 0 | Status: SHIPPED | OUTPATIENT
Start: 2018-12-18 | End: 2019-01-04

## 2018-12-18 RX ORDER — OMEPRAZOLE 40 MG/1
CAPSULE, DELAYED RELEASE ORAL
Qty: 30 CAPSULE | Refills: 0 | OUTPATIENT
Start: 2018-12-18

## 2018-12-18 RX ORDER — BROMPHENIRAMINE MALEATE, PSEUDOEPHEDRINE HYDROCHLORIDE, AND DEXTROMETHORPHAN HYDROBROMIDE 2; 30; 10 MG/5ML; MG/5ML; MG/5ML
5 SYRUP ORAL EVERY 6 HOURS PRN
Qty: 118 ML | Refills: 0 | Status: SHIPPED | OUTPATIENT
Start: 2018-12-18 | End: 2019-01-15

## 2018-12-18 RX ORDER — OMEPRAZOLE 40 MG/1
40 CAPSULE, DELAYED RELEASE ORAL DAILY
Qty: 30 CAPSULE | Refills: 3 | Status: SHIPPED | OUTPATIENT
Start: 2018-12-18 | End: 2019-04-16 | Stop reason: SDUPTHER

## 2018-12-18 NOTE — PROGRESS NOTES
Maria Del Rosario Suárez is a 43 y.o. male.     43-year-old male presents today with continued respiratory symptoms; diagnosis influenza A 3 days ago in express care clinic.  Originally seen at urgent care on 11/28 for acute URI, pneumonia and mild asthma exacerbation.  Was given steroid pack which helped resolve his symptoms.  About 4-5 days ago started to have respiratory symptoms again however more severe then 11/28 urgent care visit.  Was seen in express care on 12/15 and flu test showed he was positive for influenza A.  Was started on Tamiflu.  Was also diagnosed with pneumonia at the time of the right middle lobe.  Since then has been taking his Tamiflu and cough syrup and albuterol inhaler as needed for shortness of breath and wheezing.  Notes that he is using his albuterol inhaler once every other day; this helps resolve his shortness of breath and wheezing.  Feels as if his flu symptoms are slowly getting a little bit better.  He still has 2 days of Tamiflu left to complete.  However notes that his cough is not getting any better.  Cough is productive of white/clear sputum.  Associated with nasal congestion, sinus pressure, ear congestion bilaterally.  Denies fevers chills/night sweats.  Denies sore throat.  Reports some associated intermittent shortness of breath as noted above; responsive to albuterol inhaler.     Needs refill of Prilosec for his GERD.    The following portions of the patient's history were reviewed and updated as appropriate: allergies, current medications, past family history, past medical history, past social history, past surgical history and problem list.    Review of Systems   Constitutional: Positive for fatigue. Negative for activity change, appetite change, chills and fever.   HENT: Positive for congestion and rhinorrhea. Negative for ear pain, sinus pressure and sore throat.    Respiratory: Positive for cough and shortness of breath. Negative for wheezing.    Cardiovascular:  Negative for chest pain.   Gastrointestinal: Negative for abdominal pain, blood in stool, constipation and diarrhea.   Musculoskeletal: Positive for myalgias. Negative for back pain.   Psychiatric/Behavioral: Negative for stress. The patient is not nervous/anxious.        Objective   Physical Exam   Constitutional: He is oriented to person, place, and time. He appears well-developed and well-nourished.   HENT:   Head: Normocephalic and atraumatic.   Right Ear: Hearing, external ear and ear canal normal.   Left Ear: Hearing, external ear and ear canal normal.   Nose: Mucosal edema, rhinorrhea and congestion present. Right sinus exhibits maxillary sinus tenderness. Right sinus exhibits no frontal sinus tenderness. Left sinus exhibits maxillary sinus tenderness. Left sinus exhibits no frontal sinus tenderness.   Mouth/Throat: Uvula is midline and mucous membranes are normal. Posterior oropharyngeal edema and posterior oropharyngeal erythema present. No oropharyngeal exudate or tonsillar abscesses.   Cloudy TMs bilaterally   Eyes: Conjunctivae and EOM are normal. Pupils are equal, round, and reactive to light.   Cardiovascular: Normal rate and regular rhythm.   Pulmonary/Chest: Effort normal and breath sounds normal. No stridor. No respiratory distress. He has no decreased breath sounds. He has no wheezes. He has no rhonchi. He has no rales.   Musculoskeletal: Normal range of motion.   Lymphadenopathy:     He has cervical adenopathy.        Right cervical: Superficial cervical adenopathy present.        Left cervical: Superficial cervical adenopathy present.   Neurological: He is alert and oriented to person, place, and time.   Psychiatric: He has a normal mood and affect. His speech is normal.               Assessment/Plan     Jamie was seen today for pneumonia and influenza.    Diagnoses and all orders for this visit:    URI, acute  -     levoFLOXacin (LEVAQUIN) 750 MG tablet; Take 1 tablet by mouth Daily.  -      brompheniramine-pseudoephedrine-DM 30-2-10 MG/5ML syrup; Take 5 mL by mouth Every 6 (Six) Hours As Needed for Congestion or Cough.    Influenza A  -     XR Chest 2 View    SOB (shortness of breath)  -     XR Chest 2 View    Gastroesophageal reflux disease without esophagitis  -     omeprazole (priLOSEC) 40 MG capsule; Take 1 capsule by mouth Daily.      Review chest x-ray; does not look like any concern for pneumonia.  Lung sounds on exam are clear as well.  Will prescribe Levaquin to cover upper and lower respiratory possible pathogens.  Exam is consistent with more upper respiratory infection than lower.  Advised to continue albuterol as needed for shortness of breath or wheezing.  Advised to complete Tamiflu course.  Advised that he is contagious for the flu while on Tamiflu for 5 days.  Advised take precautions as not to spread flu the respiratory droplets.  Patient expressed understanding and agreeable to plan.  Advised to go to ER for worsening shortness of breath or wheezing.    Patient needs to stay home for the next 3 days.  Complete his course of Tamiflu and not be contagious for the flu anymore.  This also give time to help recover from the upper respiratory infection he has on top of the flu.  Given when patient was first noticing symptoms, he should be off from work from 12/15 through 12/22.  After that should be able to return to work without restrictions.

## 2018-12-19 ENCOUNTER — TELEPHONE (OUTPATIENT)
Dept: FAMILY MEDICINE CLINIC | Facility: CLINIC | Age: 43
End: 2018-12-19

## 2018-12-19 NOTE — TELEPHONE ENCOUNTER
----- Message from Ed Dong MD sent at 12/19/2018 10:22 AM EST -----  Yes that's fine.    ----- Message -----  From: Deena Celestin LPN  Sent: 12/19/2018  10:16 AM  To: Ed Dong MD    Pt is wanting to know if you will make his day to return back to work on 12/24/18

## 2018-12-20 DIAGNOSIS — R91.1 PULMONARY NODULE/LESION, SOLITARY: Primary | ICD-10-CM

## 2018-12-20 NOTE — PROGRESS NOTES
Please inform patient that his chest x-ray showed a new nodule in his lung measuring about 2.5-3 cm.  This could be due to infection as we are treating him for respiratory infection currently.  However because past x-rays have not shown a lesion like this, I would like to work this up further.  I'll be placing an order for a CT chest to follow this up.  He has any new or worsening symptoms, return to clinic sooner.  Thanks

## 2019-01-04 ENCOUNTER — OFFICE VISIT (OUTPATIENT)
Dept: FAMILY MEDICINE CLINIC | Facility: CLINIC | Age: 44
End: 2019-01-04

## 2019-01-04 VITALS
TEMPERATURE: 97.1 F | BODY MASS INDEX: 41.46 KG/M2 | DIASTOLIC BLOOD PRESSURE: 96 MMHG | OXYGEN SATURATION: 97 % | HEIGHT: 66 IN | RESPIRATION RATE: 16 BRPM | WEIGHT: 258 LBS | SYSTOLIC BLOOD PRESSURE: 132 MMHG | HEART RATE: 106 BPM

## 2019-01-04 DIAGNOSIS — R07.9 INTERMITTENT CHEST PAIN: ICD-10-CM

## 2019-01-04 DIAGNOSIS — R11.2 NAUSEA AND VOMITING, INTRACTABILITY OF VOMITING NOT SPECIFIED, UNSPECIFIED VOMITING TYPE: Primary | ICD-10-CM

## 2019-01-04 PROCEDURE — 99213 OFFICE O/P EST LOW 20 MIN: CPT | Performed by: FAMILY MEDICINE

## 2019-01-04 NOTE — PROGRESS NOTES
Subjective   Jamie Suárez is a 43 y.o. male.     43-year-old male presents with continued nausea and vomiting for the past 3 weeks now.  When he was last seen he was still recovering from the flu and had to finish Tamiflu.  There was concern for acute upper respiratory infection he was prescribed Levaquin as well as cough syrup.  He shouldn't also had a chest x-ray at that time.  He notes he never got the Levaquin or the cough syrup.  He did finish his Tamiflu.  Chest x-ray showed concern for pulmonary nodule.  I had ordered a CT chest to follow-up on this.  Patient was with family in [place] when he got the call regarding this.  He was having some shortness of breath and chest tightness as well as nausea and vomiting so he went to an emergency room there.  At that emergency room he had a CTA chest done.  There was no pulmonary embolism.  Normal heart and coronary artery and thoracic vertebrae and lymph nodes.  However it did show a 3 mm pulmonary nodule on the right upper lung and a 2 mm pulmonary nodule in the right lower lung.  So showed small hiatal hernia.  Also hepatic steatosis.    Since then patient reports that he does not have anymore upper respiratory and symptoms.  He continues to have nausea and vomiting daily.  He reports regular bowel movements.  He is able to tolerate water and maybe small amounts of Jell-O.  He vomited yesterday.  Denies blood in vomit or stool.  Denies any numbness or tingling anywhere.  Denies headache or vision changes.  He reports episodes which mainly occurs at night and make him from sleep or he has muscle spasms from his flanks moving up to his arms; it helps if he stretches out his arms.  This is associated with chest tightness and pressure and shortness of breath.  Episodes last 2-3 minutes.  They mainly occur at night and do not occur during the day.  Not associated with any exertion.  Denies fevers or chills.  Blood pressure today is 132/96 and heart rate is 106 with  oxygen saturation 97%.  Denies any heart history.  Denies any chest pain or shortness of breath today.  Denies any nausea currently.         The following portions of the patient's history were reviewed and updated as appropriate: allergies, current medications, past family history, past medical history, past social history, past surgical history and problem list.    Review of Systems   Constitutional: Negative for chills and fever.   HENT: Negative for congestion, rhinorrhea and sinus pressure.    Respiratory: Negative for cough, choking, shortness of breath, wheezing and stridor.    Cardiovascular: Negative for chest pain, palpitations and leg swelling.   Gastrointestinal: Positive for nausea and vomiting. Negative for abdominal pain, anal bleeding, blood in stool, constipation, rectal pain and GERD.   Genitourinary: Negative for urinary incontinence, hematuria and nocturia.   Musculoskeletal: Positive for myalgias.       Objective   Physical Exam   Constitutional: He is oriented to person, place, and time. He appears well-developed and well-nourished.   HENT:   Head: Normocephalic and atraumatic.   Eyes: Conjunctivae and EOM are normal. Pupils are equal, round, and reactive to light.   Pulmonary/Chest: Effort normal.   Abdominal: Soft. Normal appearance and bowel sounds are normal. He exhibits no shifting dullness and no distension. There is no hepatosplenomegaly. There is no tenderness. There is no rigidity, no rebound, no guarding and no CVA tenderness. A hernia (umbilical; reducible; no tenderness to palpation) is present.   Neurological: He is alert and oriented to person, place, and time.   Psychiatric: He has a normal mood and affect.               Assessment/Plan     Jamie was seen today for vomiting and nausea.    Diagnoses and all orders for this visit:    Nausea and vomiting, intractability of vomiting not specified, unspecified vomiting type  -     Ambulatory Referral to Gastroenterology    Intermittent  chest pain  -     Ambulatory Referral to Cardiology      Will advise patient not to return to work while workup is being done with specialists in cardiology and gastroenterology.  Patient to follow-up with me in 4-6 weeks.  Advised to go to ER for new or worsening symptoms.  Patient expressed understanding and agreeable to plan.

## 2019-01-15 ENCOUNTER — OFFICE VISIT (OUTPATIENT)
Dept: CARDIOLOGY | Facility: CLINIC | Age: 44
End: 2019-01-15

## 2019-01-15 VITALS
HEIGHT: 66 IN | BODY MASS INDEX: 41.62 KG/M2 | SYSTOLIC BLOOD PRESSURE: 138 MMHG | DIASTOLIC BLOOD PRESSURE: 94 MMHG | HEART RATE: 97 BPM | WEIGHT: 259 LBS

## 2019-01-15 DIAGNOSIS — R07.2 PRECORDIAL PAIN: Primary | ICD-10-CM

## 2019-01-15 PROCEDURE — 99204 OFFICE O/P NEW MOD 45 MIN: CPT | Performed by: INTERNAL MEDICINE

## 2019-01-15 PROCEDURE — 93000 ELECTROCARDIOGRAM COMPLETE: CPT | Performed by: INTERNAL MEDICINE

## 2019-01-15 NOTE — PROGRESS NOTES
Subjective:     Encounter Date:01/15/2019      Patient ID: Jamie Suárez is a 43 y.o. male.    Chief Complaint:  This is a 43-year-old man with a past medical history of GERD as well as chronic diarrhea and vomiting.  He presents for evaluation of chest pain.  He states his chest pain started  about a month ago.  He believes most of the pain starts as a muscle spasm in his bilateral flanks and abdomen and radiates into the chest.  Went to get the chest and feels like a tight squeezing pain.  He never S chest pain without this abdominal pain.  The most common provocative symptom is when he leans over to white himself after he goes to the bathroom which causes a muscle spasm in his flank which then goes into his chest.  He never has radiation into his jaw or arm.  He has noticed that he does feel somewhat short of breath when climbing stairs.  This is new for him.  He is not currently participating in regular exercise.  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  Sometimes he has enough  abdominal and chest tightness that he has to lean over and catch his breath.  He notes that he is unable to eat anything other than Jell-O.  Everything else causes him to vomit.  He also has chronic diarrhea.  He recently had the flu and was treated with Tamiflu which is now resolved.  He had a CT scan for dyspnea well visiting family in [place].  Of note the CT scan did not show a PE or coronary artery calcifications.  He is obese.  He has 5 children 4 for adults.  He has never smoked.  He doesn't drink.  His father had a bypass surgery in his 50s.        The following portions of the patient's history were reviewed and updated as appropriate: allergies, current medications, past family history, past medical history, past social history, past surgical history and problem list.    Past Medical History:   Diagnosis Date   • GERD (gastroesophageal reflux disease)     Reflux   • Heartburn         Family History   Problem Relation Age of Onset   • Heart disease Mother    • Hypertension Mother    • Diabetes Father        Social History     Socioeconomic History   • Marital status: Single     Spouse name: Not on file   • Number of children: 5   • Years of education: Not on file   • Highest education level: Not on file   Tobacco Use   • Smoking status: Never Smoker   • Smokeless tobacco: Never Used   Substance and Sexual Activity   • Alcohol use: No   • Drug use: No   Social History Narrative    ** Merged History Encounter **            Allergies   Allergen Reactions   • Morphine And Related Rash     He had a rash with morphine but no  Problem  with codiene       Past Surgical History:   Procedure Laterality Date   • APPENDECTOMY     • HERNIA REPAIR         Review of Systems   Constitution: Positive for decreased appetite, weakness and weight gain. Negative for chills, diaphoresis and fever.   HENT: Negative for sore throat.    Cardiovascular: Positive for chest pain and dyspnea on exertion. Negative for claudication, irregular heartbeat, leg swelling, near-syncope, orthopnea, palpitations, paroxysmal nocturnal dyspnea and syncope.   Respiratory: Positive for shortness of breath and snoring. Negative for cough, sleep disturbances due to breathing, sputum production and wheezing.    Musculoskeletal: Positive for joint pain.   Gastrointestinal: Positive for change in bowel habit, diarrhea, nausea and vomiting.         ECG 12 Lead  Date/Time: 1/15/2019 10:46 AM  Performed by: Hellen Willis MD  Authorized by: Hellen Willis MD   Previous ECG: no previous ECG available  Rhythm: sinus rhythm  Rate: normal  QRS axis: normal  Other findings: early repolarization  Clinical impression: non-specific ECG               Objective:     Physical Exam  GEN: no distress, alert and oriented. obese  Lungs CTAB, no rales or wheezes  Chest: no abnormalities  Heart: RRR, no murmurs  Abdo: soft,  Nontender, nondistended  Extr:  no edema, +2 DP and 2+ carotid pulses b/l  Skin: no rash or bruising  Psych: organized thought, normal behavior and affect    Lab Review:         Assessment:          Diagnosis Plan   1. Precordial pain  Treadmill Stress Test          Plan:         1. Precordial pain:   43-year-old man with risk factor of premature coronary artery disease in the father as well as morbid obesity.  This is most likely muscle strain related to abdominal discomfort.  However given his risk factors I asked them to complete a standard treadmill stress test.  If this is negative I would not pursue any further cardiac workup.    2.  Hypertension:  Blood pressure is mildly elevated today.  We discussed the need for weight loss for his overall health.  This will additionally help his blood pressure.  He is in agreement.    3.  Snoring: I told him to consider sleep study test.  4.  Obesity: He mentions being interested in bariatric surgery.  I agree that this will likely help him.  I encouraged him to follow-up with his primary care physician about this.     Dr. Dong, thank you for referring this patient to me.            Hellen Willis MD  01/15/19

## 2019-01-16 ENCOUNTER — OFFICE VISIT (OUTPATIENT)
Dept: GASTROENTEROLOGY | Facility: CLINIC | Age: 44
End: 2019-01-16

## 2019-01-16 VITALS
HEIGHT: 66 IN | TEMPERATURE: 97.9 F | SYSTOLIC BLOOD PRESSURE: 130 MMHG | DIASTOLIC BLOOD PRESSURE: 84 MMHG | BODY MASS INDEX: 42.01 KG/M2 | WEIGHT: 261.4 LBS

## 2019-01-16 DIAGNOSIS — R11.2 NAUSEA AND VOMITING, INTRACTABILITY OF VOMITING NOT SPECIFIED, UNSPECIFIED VOMITING TYPE: Primary | ICD-10-CM

## 2019-01-16 PROCEDURE — 99204 OFFICE O/P NEW MOD 45 MIN: CPT | Performed by: INTERNAL MEDICINE

## 2019-01-16 RX ORDER — ONDANSETRON 4 MG/1
4 TABLET, FILM COATED ORAL EVERY 8 HOURS PRN
Qty: 14 TABLET | Refills: 1 | Status: SHIPPED | OUTPATIENT
Start: 2019-01-16 | End: 2019-03-11

## 2019-01-16 NOTE — PROGRESS NOTES
Chief Complaint   Patient presents with   • Nausea   • Vomiting     Subjective   HPI  Jamie Suárez is a 43 y.o. male who presents for evaluation of nausea/vomiting.      Nausea and vomiting since mid-December.  Episodes occurring daily.  Sx associated with eating.  Emesis is partially digested food and bile.  No abdominal pain.  Some unintentional weight loss.  Has also been having atypical chest pain.  Scheduled for stress test this Friday.  No prior EGD or colonoscopy.  No family hx of GI related disorders.       Past Medical History:   Diagnosis Date   • GERD (gastroesophageal reflux disease)     Reflux   • Heartburn        Current Outpatient Medications:   •  albuterol 108 (90 Base) MCG/ACT inhaler, Inhale 2 puffs Every 6 (Six) Hours As Needed for Wheezing or Shortness of Air., Disp: 1 inhaler, Rfl: 0  •  omeprazole (priLOSEC) 40 MG capsule, Take 1 capsule by mouth Daily., Disp: 30 capsule, Rfl: 3  •  ondansetron (ZOFRAN) 4 MG tablet, Take 1 tablet by mouth Every 8 (Eight) Hours As Needed for Nausea or Vomiting., Disp: 14 tablet, Rfl: 1     Allergies   Allergen Reactions   • Morphine And Related Rash     He had a rash with morphine but no  Problem  with codiene     Social History     Socioeconomic History   • Marital status: Single     Spouse name: Not on file   • Number of children: 5   • Years of education: Not on file   • Highest education level: Not on file   Social Needs   • Financial resource strain: Not on file   • Food insecurity - worry: Not on file   • Food insecurity - inability: Not on file   • Transportation needs - medical: Not on file   • Transportation needs - non-medical: Not on file   Occupational History   • Not on file   Tobacco Use   • Smoking status: Never Smoker   • Smokeless tobacco: Never Used   Substance and Sexual Activity   • Alcohol use: Not on file   • Drug use: No   • Sexual activity: Not on file   Other Topics Concern   • Not on file   Social History Narrative    ** Merged  History Encounter **          Family History   Problem Relation Age of Onset   • Heart disease Mother    • Hypertension Mother    • Diabetes Father      Review of Systems   Cardiovascular: Positive for chest pain.   Gastrointestinal: Positive for abdominal pain, nausea and vomiting.   All other systems reviewed and are negative.    Objective   Vitals:    01/16/19 0913   BP: 130/84   Temp: 97.9 °F (36.6 °C)     Physical Exam   Constitutional: He is oriented to person, place, and time. He appears well-developed and well-nourished.   HENT:   Head: Normocephalic and atraumatic.   Mouth/Throat: Oropharynx is clear and moist.   Eyes: Conjunctivae are normal. No scleral icterus.   Neck: Normal range of motion. Neck supple. No thyromegaly present.   Cardiovascular: Normal rate and regular rhythm. Exam reveals no friction rub.   No murmur heard.  Pulmonary/Chest: Effort normal and breath sounds normal. No respiratory distress. He has no wheezes. He has no rales. He exhibits no tenderness.   Abdominal: Soft. Bowel sounds are normal. He exhibits no distension and no mass. There is no tenderness. There is no rebound and no guarding. No hernia.   Musculoskeletal: He exhibits no edema.   Lymphadenopathy:     He has no cervical adenopathy.     He has no axillary adenopathy.   Neurological: He is alert and oriented to person, place, and time.   Skin: Skin is warm and dry. No rash noted.   Psychiatric: He has a normal mood and affect. His behavior is normal. Judgment and thought content normal.   Vitals reviewed.    Assessment/Plan   Assessment:     1. Nausea and vomiting, intractability of vomiting not specified, unspecified vomiting type    2.      Chest pain, atypical    Plan:   Plan for EGD early next week assuming stress test is negative  Continue PPI  Rx for zofran sent to pts pharmacy        Lew Riddle M.D.  [facility], [place], operated by [facility]  [address] [place] [id number]  Office: [phone]

## 2019-01-16 NOTE — H&P (VIEW-ONLY)
Chief Complaint   Patient presents with   • Nausea   • Vomiting     Subjective   HPI  Jamie Suárez is a 43 y.o. male who presents for evaluation of nausea/vomiting.      Nausea and vomiting since mid-December.  Episodes occurring daily.  Sx associated with eating.  Emesis is partially digested food and bile.  No abdominal pain.  Some unintentional weight loss.  Has also been having atypical chest pain.  Scheduled for stress test this Friday.  No prior EGD or colonoscopy.  No family hx of GI related disorders.       Past Medical History:   Diagnosis Date   • GERD (gastroesophageal reflux disease)     Reflux   • Heartburn        Current Outpatient Medications:   •  albuterol 108 (90 Base) MCG/ACT inhaler, Inhale 2 puffs Every 6 (Six) Hours As Needed for Wheezing or Shortness of Air., Disp: 1 inhaler, Rfl: 0  •  omeprazole (priLOSEC) 40 MG capsule, Take 1 capsule by mouth Daily., Disp: 30 capsule, Rfl: 3  •  ondansetron (ZOFRAN) 4 MG tablet, Take 1 tablet by mouth Every 8 (Eight) Hours As Needed for Nausea or Vomiting., Disp: 14 tablet, Rfl: 1     Allergies   Allergen Reactions   • Morphine And Related Rash     He had a rash with morphine but no  Problem  with codiene     Social History     Socioeconomic History   • Marital status: Single     Spouse name: Not on file   • Number of children: 5   • Years of education: Not on file   • Highest education level: Not on file   Social Needs   • Financial resource strain: Not on file   • Food insecurity - worry: Not on file   • Food insecurity - inability: Not on file   • Transportation needs - medical: Not on file   • Transportation needs - non-medical: Not on file   Occupational History   • Not on file   Tobacco Use   • Smoking status: Never Smoker   • Smokeless tobacco: Never Used   Substance and Sexual Activity   • Alcohol use: Not on file   • Drug use: No   • Sexual activity: Not on file   Other Topics Concern   • Not on file   Social History Narrative    ** Merged  History Encounter **          Family History   Problem Relation Age of Onset   • Heart disease Mother    • Hypertension Mother    • Diabetes Father      Review of Systems   Cardiovascular: Positive for chest pain.   Gastrointestinal: Positive for abdominal pain, nausea and vomiting.   All other systems reviewed and are negative.    Objective   Vitals:    01/16/19 0913   BP: 130/84   Temp: 97.9 °F (36.6 °C)     Physical Exam   Constitutional: He is oriented to person, place, and time. He appears well-developed and well-nourished.   HENT:   Head: Normocephalic and atraumatic.   Mouth/Throat: Oropharynx is clear and moist.   Eyes: Conjunctivae are normal. No scleral icterus.   Neck: Normal range of motion. Neck supple. No thyromegaly present.   Cardiovascular: Normal rate and regular rhythm. Exam reveals no friction rub.   No murmur heard.  Pulmonary/Chest: Effort normal and breath sounds normal. No respiratory distress. He has no wheezes. He has no rales. He exhibits no tenderness.   Abdominal: Soft. Bowel sounds are normal. He exhibits no distension and no mass. There is no tenderness. There is no rebound and no guarding. No hernia.   Musculoskeletal: He exhibits no edema.   Lymphadenopathy:     He has no cervical adenopathy.     He has no axillary adenopathy.   Neurological: He is alert and oriented to person, place, and time.   Skin: Skin is warm and dry. No rash noted.   Psychiatric: He has a normal mood and affect. His behavior is normal. Judgment and thought content normal.   Vitals reviewed.    Assessment/Plan   Assessment:     1. Nausea and vomiting, intractability of vomiting not specified, unspecified vomiting type    2.      Chest pain, atypical    Plan:   Plan for EGD early next week assuming stress test is negative  Continue PPI  Rx for zofran sent to pts pharmacy        Lew Riddle M.D.  [facility]  [address] [place] [id number]  Office: [phone]  [phone]

## 2019-01-18 ENCOUNTER — HOSPITAL ENCOUNTER (OUTPATIENT)
Dept: CARDIOLOGY | Facility: HOSPITAL | Age: 44
Discharge: HOME | End: 2019-01-18
Attending: INTERNAL MEDICINE | Admitting: INTERNAL MEDICINE

## 2019-01-18 DIAGNOSIS — R07.2 PRECORDIAL PAIN: ICD-10-CM

## 2019-01-18 LAB
BH CV STRESS BP STAGE 1: NORMAL
BH CV STRESS BP STAGE 2: NORMAL
BH CV STRESS DURATION MIN STAGE 1: 3
BH CV STRESS DURATION MIN STAGE 2: 3
BH CV STRESS DURATION SEC STAGE 1: 0
BH CV STRESS DURATION SEC STAGE 2: 0
BH CV STRESS GRADE STAGE 1: 10
BH CV STRESS GRADE STAGE 2: 12
BH CV STRESS HR STAGE 1: 132
BH CV STRESS HR STAGE 2: 157
BH CV STRESS METS STAGE 1: 5
BH CV STRESS METS STAGE 2: 7.5
BH CV STRESS PROTOCOL 1: NORMAL
BH CV STRESS RECOVERY BP: NORMAL MMHG
BH CV STRESS RECOVERY HR: 118 BPM
BH CV STRESS SPEED STAGE 1: 1.7
BH CV STRESS SPEED STAGE 2: 2.5
BH CV STRESS STAGE 1: 1
BH CV STRESS STAGE 2: 2
MAXIMAL PREDICTED HEART RATE: 176 BPM
PERCENT MAX PREDICTED HR: 89.2 %
STRESS BASELINE BP: NORMAL MMHG
STRESS BASELINE HR: 99 BPM
STRESS PERCENT HR: 105 %
STRESS POST ESTIMATED WORKLOAD: 7 METS
STRESS POST EXERCISE DUR MIN: 6 MIN
STRESS POST EXERCISE DUR SEC: 0 SEC
STRESS POST PEAK BP: NORMAL MMHG
STRESS POST PEAK HR: 157 BPM
STRESS TARGET HR: 150 BPM

## 2019-01-18 PROCEDURE — 93017 CV STRESS TEST TRACING ONLY: CPT

## 2019-01-18 PROCEDURE — 93016 CV STRESS TEST SUPVJ ONLY: CPT | Performed by: INTERNAL MEDICINE

## 2019-01-18 PROCEDURE — 93018 CV STRESS TEST I&R ONLY: CPT | Performed by: INTERNAL MEDICINE

## 2019-01-21 ENCOUNTER — ANESTHESIA (OUTPATIENT)
Dept: GASTROENTEROLOGY | Facility: HOSPITAL | Age: 44
End: 2019-01-21

## 2019-01-21 ENCOUNTER — ANESTHESIA EVENT (OUTPATIENT)
Dept: GASTROENTEROLOGY | Facility: HOSPITAL | Age: 44
End: 2019-01-21

## 2019-01-21 ENCOUNTER — HOSPITAL ENCOUNTER (OUTPATIENT)
Facility: HOSPITAL | Age: 44
Setting detail: HOSPITAL OUTPATIENT SURGERY
Discharge: HOME | End: 2019-01-21
Attending: INTERNAL MEDICINE | Admitting: INTERNAL MEDICINE

## 2019-01-21 VITALS
TEMPERATURE: 97.7 F | BODY MASS INDEX: 42.3 KG/M2 | OXYGEN SATURATION: 96 % | DIASTOLIC BLOOD PRESSURE: 96 MMHG | HEART RATE: 98 BPM | WEIGHT: 263.2 LBS | RESPIRATION RATE: 20 BRPM | SYSTOLIC BLOOD PRESSURE: 132 MMHG | HEIGHT: 66 IN

## 2019-01-21 DIAGNOSIS — R11.2 NAUSEA AND VOMITING, INTRACTABILITY OF VOMITING NOT SPECIFIED, UNSPECIFIED VOMITING TYPE: ICD-10-CM

## 2019-01-21 PROCEDURE — 94799 UNLISTED PULMONARY SVC/PX: CPT

## 2019-01-21 PROCEDURE — 25010000002 PROPOFOL 10 MG/ML EMULSION: Performed by: ANESTHESIOLOGY

## 2019-01-21 PROCEDURE — 43239 EGD BIOPSY SINGLE/MULTIPLE: CPT | Performed by: INTERNAL MEDICINE

## 2019-01-21 PROCEDURE — 88305 TISSUE EXAM BY PATHOLOGIST: CPT | Performed by: INTERNAL MEDICINE

## 2019-01-21 PROCEDURE — 94640 AIRWAY INHALATION TREATMENT: CPT

## 2019-01-21 RX ORDER — LIDOCAINE HYDROCHLORIDE 20 MG/ML
INJECTION, SOLUTION INFILTRATION; PERINEURAL AS NEEDED
Status: DISCONTINUED | OUTPATIENT
Start: 2019-01-21 | End: 2019-01-21 | Stop reason: SURG

## 2019-01-21 RX ORDER — SODIUM CHLORIDE, SODIUM LACTATE, POTASSIUM CHLORIDE, CALCIUM CHLORIDE 600; 310; 30; 20 MG/100ML; MG/100ML; MG/100ML; MG/100ML
1000 INJECTION, SOLUTION INTRAVENOUS CONTINUOUS
Status: DISCONTINUED | OUTPATIENT
Start: 2019-01-21 | End: 2019-01-21 | Stop reason: HOSPADM

## 2019-01-21 RX ORDER — IPRATROPIUM BROMIDE AND ALBUTEROL SULFATE 2.5; .5 MG/3ML; MG/3ML
3 SOLUTION RESPIRATORY (INHALATION) ONCE AS NEEDED
Status: COMPLETED | OUTPATIENT
Start: 2019-01-21 | End: 2019-01-21

## 2019-01-21 RX ORDER — PROPOFOL 10 MG/ML
VIAL (ML) INTRAVENOUS AS NEEDED
Status: DISCONTINUED | OUTPATIENT
Start: 2019-01-21 | End: 2019-01-21 | Stop reason: SURG

## 2019-01-21 RX ORDER — ONDANSETRON 2 MG/ML
4 INJECTION INTRAMUSCULAR; INTRAVENOUS ONCE AS NEEDED
Status: DISCONTINUED | OUTPATIENT
Start: 2019-01-21 | End: 2019-01-21 | Stop reason: HOSPADM

## 2019-01-21 RX ADMIN — PROPOFOL 200 MG: 10 INJECTION, EMULSION INTRAVENOUS at 08:03

## 2019-01-21 RX ADMIN — IPRATROPIUM BROMIDE AND ALBUTEROL SULFATE 3 ML: 2.5; .5 SOLUTION RESPIRATORY (INHALATION) at 08:33

## 2019-01-21 RX ADMIN — LIDOCAINE HYDROCHLORIDE 50 MG: 20 INJECTION, SOLUTION INFILTRATION; PERINEURAL at 08:03

## 2019-01-21 RX ADMIN — SODIUM CHLORIDE, POTASSIUM CHLORIDE, SODIUM LACTATE AND CALCIUM CHLORIDE 1000 ML: 600; 310; 30; 20 INJECTION, SOLUTION INTRAVENOUS at 07:26

## 2019-01-21 NOTE — DISCHARGE INSTRUCTIONS
For the next 24 hours patient needs to be with a responsible adult.    For 24 hours DO NOT drive, operate machinery, appliances, drink alcohol, make important decisions or sign legal documents.    Start with a light or bland diet and advance to regular diet as tolerated.    Follow recommendations on procedure report provided by your doctor.    Call Dr Riddle for problems [phone]    Problems may include but not limited to: large amounts of bleeding, trouble breathing, repeated vomiting, severe unrelieved pain, fever or chills.

## 2019-01-21 NOTE — ANESTHESIA PREPROCEDURE EVALUATION
Anesthesia Evaluation     Patient summary reviewed and Nursing notes reviewed   NPO Solid Status: > 8 hours  NPO Liquid Status: > 8 hours           Airway   Mallampati: II  TM distance: <3 FB  Neck ROM: limited  Difficult intubation highly probable  Dental - normal exam     Pulmonary - normal exam   Cardiovascular - normal exam        Neuro/Psych  GI/Hepatic/Renal/Endo    (+) morbid obesity,      Musculoskeletal     Abdominal    Substance History      OB/GYN          Other                        Anesthesia Plan    ASA 3     MAC     Anesthetic plan, all risks, benefits, and alternatives have been provided, discussed and informed consent has been obtained with: patient.

## 2019-01-21 NOTE — ANESTHESIA POSTPROCEDURE EVALUATION
"Patient: Jamie Suárez    Procedure Summary     Date:  01/21/19 Room / Location:  [facility] ENDOSCOPY 10 /  [facility]    Anesthesia Start:  0802 Anesthesia Stop:  0832    Procedure:  ESOPHAGOGASTRODUODENOSCOPY with biopsy (N/A Esophagus) Diagnosis:       Nausea and vomiting, intractability of vomiting not specified, unspecified vomiting type      (Nausea and vomiting, intractability of vomiting not specified, unspecified vomiting type [R11.2])    Surgeon:  Lew Riddle MD Provider:  Shefali Hughes MD    Anesthesia Type:  MAC ASA Status:  3          Anesthesia Type: No value filed.  Last vitals  BP   132/96 (01/21/19 0902)   Temp   36.5 °C (97.7 °F) (01/21/19 0715)   Pulse   100 (01/21/19 0902)   Resp   20 (01/21/19 0902)     SpO2   96 % (01/21/19 0902)     Post Anesthesia Care and Evaluation    Patient location during evaluation: PACU  Patient participation: complete - patient participated  Level of consciousness: awake  Pain score: 0  Pain management: adequate  Airway patency: patent  Anesthetic complications: No anesthetic complications    Cardiovascular status: acceptable  Respiratory status: acceptable  Hydration status: acceptable    Comments: Blood pressure 132/96, pulse 100, temperature 36.5 °C (97.7 °F), temperature source Oral, resp. rate 20, height 167.6 cm (66\"), weight 119 kg (263 lb 3.2 oz), SpO2 96 %.    No anesthesia care post op    "

## 2019-01-22 LAB
CYTO UR: NORMAL
LAB AP CASE REPORT: NORMAL
PATH REPORT.FINAL DX SPEC: NORMAL
PATH REPORT.GROSS SPEC: NORMAL

## 2019-01-24 ENCOUNTER — TELEPHONE (OUTPATIENT)
Dept: GASTROENTEROLOGY | Facility: CLINIC | Age: 44
End: 2019-01-24

## 2019-01-24 DIAGNOSIS — R11.2 NAUSEA AND VOMITING, INTRACTABILITY OF VOMITING NOT SPECIFIED, UNSPECIFIED VOMITING TYPE: Primary | ICD-10-CM

## 2019-01-24 NOTE — TELEPHONE ENCOUNTER
----- Message from Lew Riddle MD sent at 1/24/2019  7:46 AM EST -----  Minimal inflammation only  Please arrange for Ct A/P with IV and oral contrast - indication is nausea/vomiting

## 2019-01-24 NOTE — PROGRESS NOTES
Minimal inflammation only  Please arrange for Ct A/P with IV and oral contrast - indication is nausea/vomiting

## 2019-01-28 NOTE — TELEPHONE ENCOUNTER
Called pt and advised that per Dr Riddle: the path results from the upper scope showed just minimal inflammation only. Advised MD recommends to arrange a CT scan of the abdomen and pelvis to further evaluate his nausea and vomiting. Pt verb understanding and is in agreement with the plan. He will await call from scheduling to arrange CT.   Order CT scan placed.

## 2019-02-07 ENCOUNTER — HOSPITAL ENCOUNTER (OUTPATIENT)
Dept: CT IMAGING | Facility: HOSPITAL | Age: 44
Discharge: HOME | End: 2019-02-07
Attending: INTERNAL MEDICINE | Admitting: INTERNAL MEDICINE

## 2019-02-07 DIAGNOSIS — R11.2 NAUSEA AND VOMITING, INTRACTABILITY OF VOMITING NOT SPECIFIED, UNSPECIFIED VOMITING TYPE: ICD-10-CM

## 2019-02-07 PROCEDURE — 0 DIATRIZOATE MEGLUMINE & SODIUM PER 1 ML: Performed by: INTERNAL MEDICINE

## 2019-02-07 PROCEDURE — 74177 CT ABD & PELVIS W/CONTRAST: CPT

## 2019-02-07 PROCEDURE — 25010000002 IOPAMIDOL 61 % SOLUTION: Performed by: INTERNAL MEDICINE

## 2019-02-07 RX ADMIN — IOPAMIDOL 85 ML: 612 INJECTION, SOLUTION INTRAVENOUS at 10:36

## 2019-02-07 RX ADMIN — DIATRIZOATE MEGLUMINE AND DIATRIZOATE SODIUM 30 ML: 660; 100 LIQUID ORAL; RECTAL at 10:36

## 2019-02-08 ENCOUNTER — OFFICE VISIT (OUTPATIENT)
Dept: FAMILY MEDICINE CLINIC | Facility: CLINIC | Age: 44
End: 2019-02-08

## 2019-02-08 VITALS
WEIGHT: 259 LBS | OXYGEN SATURATION: 96 % | BODY MASS INDEX: 41.62 KG/M2 | TEMPERATURE: 97.7 F | DIASTOLIC BLOOD PRESSURE: 90 MMHG | HEART RATE: 93 BPM | RESPIRATION RATE: 16 BRPM | HEIGHT: 66 IN | SYSTOLIC BLOOD PRESSURE: 137 MMHG

## 2019-02-08 DIAGNOSIS — E66.01 MORBID OBESITY (HCC): ICD-10-CM

## 2019-02-08 DIAGNOSIS — R11.2 INTRACTABLE VOMITING WITH NAUSEA, UNSPECIFIED VOMITING TYPE: ICD-10-CM

## 2019-02-08 DIAGNOSIS — R35.1 NOCTURIA: ICD-10-CM

## 2019-02-08 DIAGNOSIS — R06.83 SNORING: Primary | ICD-10-CM

## 2019-02-08 PROCEDURE — 99213 OFFICE O/P EST LOW 20 MIN: CPT | Performed by: FAMILY MEDICINE

## 2019-02-08 NOTE — PROGRESS NOTES
Subjective   Jamie Suárez is a 43 y.o. male.     43-year-old male presents for follow-up after having been referred to cardiology and gastroenterology for workup regarding intermittent chest pain and abdominal pain.  Patient had a stress test done via cardiology which was negative.  He had an EGD done with gastroenterology which showed mildly severe esophagitis, hiatal hernia, normal duodenum.  Patient had some respiratory issues with anesthesiology during the EGD.  GI then ordered a CT of abdomen/pelvis to be done as patient still had nausea and vomiting.  This was done yesterday.  Reports just showed up which showed diffuse hepatic steatosis.  Today patient reports that chest pain has resolved.    Of note patient is morbidly obese with a Mallampati score of 3.  He also snores and does not feel rested after full night of sleep. Reports nocturia 2-3x a night. Sometimes wakes up in the middle of the night feeling out of breath. Denies witnessed apneas; being told about this. Patient discussed bariatric surgery when he last saw cardiologist.  He is interested in this to help with weight loss.  Sleep schedule is not always consistent; varying sleep time and wake time.  Sleep latency is a few minutes.  Reports some electronic use in bed.                 The following portions of the patient's history were reviewed and updated as appropriate: allergies, current medications, past family history, past medical history, past social history, past surgical history and problem list.    Review of Systems   Constitutional: Negative for chills and fever.   Respiratory: Positive for shortness of breath. Negative for cough.    Cardiovascular: Negative for chest pain, palpitations and leg swelling.   Gastrointestinal: Positive for nausea and vomiting. Negative for blood in stool, constipation and diarrhea.   Genitourinary: Positive for nocturia.   Psychiatric/Behavioral: Positive for sleep disturbance.       Objective   Physical  Exam   Constitutional: He appears well-developed and well-nourished.   HENT:   Head: Normocephalic and atraumatic.   Mallampati score of 3   Eyes: Conjunctivae and EOM are normal. Pupils are equal, round, and reactive to light.   Neck: Normal range of motion. Neck supple.   Cardiovascular: Normal rate, regular rhythm, S1 normal and normal heart sounds.   Pulmonary/Chest: Effort normal and breath sounds normal. No stridor. He has no decreased breath sounds. He has no wheezes. He has no rhonchi. He has no rales.   Abdominal: Soft. Normal appearance. There is no tenderness.   Psychiatric: He has a normal mood and affect.               Assessment/Plan     Jamie was seen today for chest pain.    Diagnoses and all orders for this visit:    Snoring  -     Polysomnography 4 or More Parameters; Future    Morbid obesity (CMS/HCC)  -     Polysomnography 4 or More Parameters; Future    Nocturia  -     Polysomnography 4 or More Parameters; Future    Intractable vomiting with nausea, unspecified vomiting type      Cont plan of care per GI for continued nausea/vomiting; given patient's morbid obesity and symptoms concerning for obstructive sleep apnea will order sleep study and follow-up on these results.  Follow up with me in clinic in 4 weeks to reassess how he is doing and need for continued leave of absence.

## 2019-02-12 ENCOUNTER — TELEPHONE (OUTPATIENT)
Dept: GASTROENTEROLOGY | Facility: CLINIC | Age: 44
End: 2019-02-12

## 2019-02-12 DIAGNOSIS — R11.2 NAUSEA AND VOMITING, INTRACTABILITY OF VOMITING NOT SPECIFIED, UNSPECIFIED VOMITING TYPE: Primary | ICD-10-CM

## 2019-02-12 NOTE — TELEPHONE ENCOUNTER
Called pt and advised of the note from Dr Riddle. Advised will place the order for the HIDA and someone should call him within a day or so to schedule the test. Pt verb understanding and is in agreement with the plan.   HIDA order placed.

## 2019-02-12 NOTE — TELEPHONE ENCOUNTER
----- Message from Lew Riddle MD sent at 2/12/2019 11:14 AM EST -----  Fatty liver  Small hiatal hernia  Nothing to explain his N/V    Please arrange for LENI

## 2019-02-19 ENCOUNTER — HOSPITAL ENCOUNTER (OUTPATIENT)
Dept: NUCLEAR MEDICINE | Facility: HOSPITAL | Age: 44
Discharge: HOME | End: 2019-02-19

## 2019-02-19 DIAGNOSIS — R11.2 NAUSEA AND VOMITING, INTRACTABILITY OF VOMITING NOT SPECIFIED, UNSPECIFIED VOMITING TYPE: ICD-10-CM

## 2019-02-19 PROCEDURE — A9537 TC99M MEBROFENIN: HCPCS | Performed by: INTERNAL MEDICINE

## 2019-02-19 PROCEDURE — 0 TECHNETIUM TC 99M MEBROFENIN KIT: Performed by: INTERNAL MEDICINE

## 2019-02-19 PROCEDURE — 78226 HEPATOBILIARY SYSTEM IMAGING: CPT

## 2019-02-19 RX ORDER — KIT FOR THE PREPARATION OF TECHNETIUM TC 99M MEBROFENIN 45 MG/10ML
1 INJECTION, POWDER, LYOPHILIZED, FOR SOLUTION INTRAVENOUS
Status: COMPLETED | OUTPATIENT
Start: 2019-02-19 | End: 2019-02-19

## 2019-02-19 RX ADMIN — MEBROFENIN 1 DOSE: 45 INJECTION, POWDER, LYOPHILIZED, FOR SOLUTION INTRAVENOUS at 09:50

## 2019-02-21 ENCOUNTER — TELEPHONE (OUTPATIENT)
Dept: GASTROENTEROLOGY | Facility: CLINIC | Age: 44
End: 2019-02-21

## 2019-02-21 DIAGNOSIS — R11.2 NAUSEA AND VOMITING, INTRACTABILITY OF VOMITING NOT SPECIFIED, UNSPECIFIED VOMITING TYPE: Primary | ICD-10-CM

## 2019-02-21 NOTE — TELEPHONE ENCOUNTER
----- Message from Lew Riddle MD sent at 2/20/2019  4:40 PM EST -----  Normal  Gastric emptying scan would be next test if pt still symptomatic

## 2019-02-21 NOTE — TELEPHONE ENCOUNTER
Patient called, advised as per Dr. Riddle's note. He verb understanding and is agreeable to the plan.

## 2019-03-11 ENCOUNTER — OFFICE VISIT (OUTPATIENT)
Dept: FAMILY MEDICINE CLINIC | Facility: CLINIC | Age: 44
End: 2019-03-11

## 2019-03-11 VITALS
RESPIRATION RATE: 18 BRPM | OXYGEN SATURATION: 98 % | BODY MASS INDEX: 41.62 KG/M2 | DIASTOLIC BLOOD PRESSURE: 72 MMHG | WEIGHT: 259 LBS | HEART RATE: 115 BPM | TEMPERATURE: 98.5 F | HEIGHT: 66 IN | SYSTOLIC BLOOD PRESSURE: 138 MMHG

## 2019-03-11 DIAGNOSIS — G47.10 HYPERSOMNIA: ICD-10-CM

## 2019-03-11 DIAGNOSIS — R11.2 NAUSEA AND VOMITING, INTRACTABILITY OF VOMITING NOT SPECIFIED, UNSPECIFIED VOMITING TYPE: Primary | ICD-10-CM

## 2019-03-11 DIAGNOSIS — J30.9 ALLERGIC RHINITIS, UNSPECIFIED SEASONALITY, UNSPECIFIED TRIGGER: ICD-10-CM

## 2019-03-11 DIAGNOSIS — G47.8 NON-RESTORATIVE SLEEP: ICD-10-CM

## 2019-03-11 PROCEDURE — 99213 OFFICE O/P EST LOW 20 MIN: CPT | Performed by: FAMILY MEDICINE

## 2019-03-11 RX ORDER — FLUTICASONE PROPIONATE 50 MCG
2 SPRAY, SUSPENSION (ML) NASAL DAILY
Qty: 15.8 ML | Refills: 2 | Status: SHIPPED | OUTPATIENT
Start: 2019-03-11 | End: 2019-06-19 | Stop reason: SDUPTHER

## 2019-03-11 RX ORDER — ZOLPIDEM TARTRATE 5 MG/1
TABLET ORAL
Qty: 2 TABLET | Refills: 0 | Status: SHIPPED | OUTPATIENT
Start: 2019-03-11 | End: 2019-06-25

## 2019-03-11 NOTE — PROGRESS NOTES
Maria Del Rosario Suárez is a 43 y.o. male.     43-year-old male presents today to follow-up on nausea and vomiting.  At last visit ordered sleep study as patient is morbidly obese with Mallampati score of 4 and nonrestorative sleep and hypersomnia.  Study has not been done yet per patient.  However he is scheduled for March 21, 2019.  Because patient does have issues with insomnia he will need prescription for Ambien for the night of the sleep study.    Has been evaluated by cardiology; advised to follow-up as needed after workup is complete.    Has also been evaluated by GI.  Has had a normal CT scan of abdomen and pelvis, normal HIDA scan.  Her records next step is to do a gastric emptying scan if patient is still symptomatic.  Today patient reports he continues to have nausea vomiting and cramping when he eats certain foods.  He has cut out fast food and greasy food.  He is able to tolerate seafood and grilled food as long as they are in small portions.  Is also eating Jell-O and applesauce.    Continuing to try to work on weight loss.  Is still interested in bariatric surgery.  Has an appointment at weight loss clinic with an MD soon to discuss other options as well.    Also reports watery itchy eyes and mild sore throat and cough which is worse at night for the past few days.  Does not take any allergy medications.  Denies fevers or chills or congestion or ear pain or shortness of breath or wheezing.         The following portions of the patient's history were reviewed and updated as appropriate: allergies, current medications, past family history, past medical history, past social history, past surgical history and problem list.    Review of Systems   Constitutional: Positive for fatigue. Negative for chills and fever.   HENT: Positive for postnasal drip, rhinorrhea and sore throat.    Eyes: Positive for itching.   Respiratory: Negative for cough and shortness of breath.    Gastrointestinal: Positive for  nausea and vomiting. Negative for abdominal pain, constipation and diarrhea.   Psychiatric/Behavioral: Positive for sleep disturbance.       Objective   Physical Exam   Constitutional: He is oriented to person, place, and time. He appears well-developed and well-nourished.   HENT:   Head: Normocephalic and atraumatic.   Eyes: Conjunctivae and EOM are normal. Pupils are equal, round, and reactive to light.   Neck: Normal range of motion. Neck supple.   Cardiovascular: Normal rate and regular rhythm.   No murmur heard.  Pulmonary/Chest: Effort normal and breath sounds normal.   Musculoskeletal: Normal range of motion.   Neurological: He is alert and oriented to person, place, and time.   Psychiatric: He has a normal mood and affect.         Assessment/Plan     Jamie was seen today for snoring, insomnia and heartburn.    Diagnoses and all orders for this visit:    Nausea and vomiting, intractability of vomiting not specified, unspecified vomiting type    Allergic rhinitis, unspecified seasonality, unspecified trigger  -     fluticasone (FLONASE) 50 MCG/ACT nasal spray; 2 sprays into the nostril(s) as directed by provider Daily.    Hypersomnia  -     zolpidem (AMBIEN) 5 MG tablet; Take by mouth as needed for sleep night of sleep study    Non-restorative sleep  -     zolpidem (AMBIEN) 5 MG tablet; Take by mouth as needed for sleep night of sleep study          Follow-up sleep study.  Given prescription for Ambien to take to sleep study.    Prescription given for Flonase for allergic rhinitis.    Will await results of gastric emptying study which is due to happen tomorrow.  We will also await further GI recommendations.  Can extend leave of absence from work another 6 weeks.  Return to clinic in 6 weeks to follow-up with me.

## 2019-03-12 ENCOUNTER — HOSPITAL ENCOUNTER (OUTPATIENT)
Dept: NUCLEAR MEDICINE | Facility: HOSPITAL | Age: 44
Discharge: HOME | End: 2019-03-12

## 2019-03-12 DIAGNOSIS — R11.2 NAUSEA AND VOMITING, INTRACTABILITY OF VOMITING NOT SPECIFIED, UNSPECIFIED VOMITING TYPE: ICD-10-CM

## 2019-03-21 ENCOUNTER — HOSPITAL ENCOUNTER (OUTPATIENT)
Dept: SLEEP MEDICINE | Facility: HOSPITAL | Age: 44
Discharge: HOME | End: 2019-03-21
Admitting: FAMILY MEDICINE

## 2019-03-21 DIAGNOSIS — E66.01 MORBID OBESITY (HCC): ICD-10-CM

## 2019-03-21 DIAGNOSIS — R06.83 SNORING: ICD-10-CM

## 2019-03-21 DIAGNOSIS — R35.1 NOCTURIA: ICD-10-CM

## 2019-03-21 PROCEDURE — 95811 POLYSOM 6/>YRS CPAP 4/> PARM: CPT

## 2019-03-21 PROCEDURE — 95811 POLYSOM 6/>YRS CPAP 4/> PARM: CPT | Performed by: FAMILY MEDICINE

## 2019-04-03 ENCOUNTER — TELEPHONE (OUTPATIENT)
Dept: SLEEP MEDICINE | Facility: HOSPITAL | Age: 44
End: 2019-04-03

## 2019-04-03 NOTE — TELEPHONE ENCOUNTER
Spoke with patient about sleep study results, sending [facility] to [facility], patient has appointment at [facility] office 4/22/2019.

## 2019-04-16 DIAGNOSIS — K21.9 GASTROESOPHAGEAL REFLUX DISEASE WITHOUT ESOPHAGITIS: ICD-10-CM

## 2019-04-16 RX ORDER — OMEPRAZOLE 40 MG/1
CAPSULE, DELAYED RELEASE ORAL
Qty: 30 CAPSULE | Refills: 3 | Status: SHIPPED | OUTPATIENT
Start: 2019-04-16 | End: 2019-08-15 | Stop reason: SDUPTHER

## 2019-04-22 ENCOUNTER — OFFICE VISIT (OUTPATIENT)
Dept: FAMILY MEDICINE CLINIC | Facility: CLINIC | Age: 44
End: 2019-04-22

## 2019-04-22 VITALS
DIASTOLIC BLOOD PRESSURE: 72 MMHG | WEIGHT: 262 LBS | OXYGEN SATURATION: 99 % | RESPIRATION RATE: 16 BRPM | TEMPERATURE: 98.4 F | HEART RATE: 122 BPM | BODY MASS INDEX: 42.11 KG/M2 | SYSTOLIC BLOOD PRESSURE: 148 MMHG | HEIGHT: 66 IN

## 2019-04-22 DIAGNOSIS — R11.2 NAUSEA AND VOMITING, INTRACTABILITY OF VOMITING NOT SPECIFIED, UNSPECIFIED VOMITING TYPE: Primary | ICD-10-CM

## 2019-04-22 DIAGNOSIS — G47.33 OSA (OBSTRUCTIVE SLEEP APNEA): ICD-10-CM

## 2019-04-22 DIAGNOSIS — G25.81 RESTLESS LEGS: ICD-10-CM

## 2019-04-22 PROCEDURE — 99213 OFFICE O/P EST LOW 20 MIN: CPT | Performed by: FAMILY MEDICINE

## 2019-04-30 ENCOUNTER — TELEPHONE (OUTPATIENT)
Dept: GASTROENTEROLOGY | Facility: CLINIC | Age: 44
End: 2019-04-30

## 2019-04-30 NOTE — TELEPHONE ENCOUNTER
----- Message from Tammy Ledezma sent at 4/30/2019  9:36 AM EDT -----  Regarding: records   Contact: [phone]  Ext [phone]  [facility] and any procedures.  She deals with the pt short term disability.      Fax#[phone]

## 2019-05-08 ENCOUNTER — HOSPITAL ENCOUNTER (OUTPATIENT)
Dept: NUCLEAR MEDICINE | Facility: HOSPITAL | Age: 44
Discharge: HOME | End: 2019-05-08

## 2019-05-08 PROCEDURE — 0 TECHNETIUM SULFUR COLLOID: Performed by: INTERNAL MEDICINE

## 2019-05-08 PROCEDURE — A9541 TC99M SULFUR COLLOID: HCPCS | Performed by: INTERNAL MEDICINE

## 2019-05-08 PROCEDURE — 78264 GASTRIC EMPTYING IMG STUDY: CPT

## 2019-05-08 RX ADMIN — TECHNETIUM TC 99M SULFUR COLLOID 1 DOSE: KIT at 07:53

## 2019-05-24 ENCOUNTER — TELEPHONE (OUTPATIENT)
Dept: GASTROENTEROLOGY | Facility: CLINIC | Age: 44
End: 2019-05-24

## 2019-05-24 NOTE — TELEPHONE ENCOUNTER
----- Message from Lew Riddle MD sent at 5/16/2019  4:00 PM EDT -----  Normal  F/U with NP if still symptomatic

## 2019-05-24 NOTE — TELEPHONE ENCOUNTER
Called pt and advised of the note from Dr Riddle. Pt verb understanding. He is currently doing well

## 2019-05-31 LAB — FERRITIN SERPL-MCNC: 269 NG/ML (ref 30–400)

## 2019-06-19 DIAGNOSIS — J30.9 ALLERGIC RHINITIS, UNSPECIFIED SEASONALITY, UNSPECIFIED TRIGGER: ICD-10-CM

## 2019-06-19 RX ORDER — FLUTICASONE PROPIONATE 50 MCG
SPRAY, SUSPENSION (ML) NASAL
Qty: 16 ML | Refills: 2 | OUTPATIENT
Start: 2019-06-19 | End: 2020-04-01

## 2019-06-25 ENCOUNTER — OFFICE VISIT (OUTPATIENT)
Dept: FAMILY MEDICINE CLINIC | Facility: CLINIC | Age: 44
End: 2019-06-25

## 2019-06-25 VITALS
HEIGHT: 66 IN | RESPIRATION RATE: 16 BRPM | HEART RATE: 117 BPM | BODY MASS INDEX: 41.46 KG/M2 | WEIGHT: 258 LBS | DIASTOLIC BLOOD PRESSURE: 89 MMHG | SYSTOLIC BLOOD PRESSURE: 132 MMHG | OXYGEN SATURATION: 96 % | TEMPERATURE: 98.4 F

## 2019-06-25 DIAGNOSIS — G47.33 OSA (OBSTRUCTIVE SLEEP APNEA): Primary | ICD-10-CM

## 2019-06-25 DIAGNOSIS — G25.81 RESTLESS LEGS SYNDROME: ICD-10-CM

## 2019-06-25 DIAGNOSIS — E66.01 MORBID OBESITY (HCC): ICD-10-CM

## 2019-06-25 PROCEDURE — 99213 OFFICE O/P EST LOW 20 MIN: CPT | Performed by: FAMILY MEDICINE

## 2019-06-25 RX ORDER — ROPINIROLE 0.25 MG/1
TABLET, FILM COATED ORAL
Qty: 60 TABLET | Refills: 1 | OUTPATIENT
Start: 2019-06-25 | End: 2020-04-01

## 2019-07-20 PROBLEM — R11.2 NAUSEA AND VOMITING: Status: RESOLVED | Noted: 2019-01-16 | Resolved: 2019-07-20

## 2019-08-07 ENCOUNTER — APPOINTMENT (OUTPATIENT)
Dept: SLEEP MEDICINE | Facility: HOSPITAL | Age: 44
End: 2019-08-07

## 2019-08-07 PROBLEM — G25.81 RESTLESS LEGS SYNDROME (RLS): Status: ACTIVE | Noted: 2019-08-07

## 2019-08-07 NOTE — PROGRESS NOTES
Follow Up Sleep Disorders Center Note     Chief Complaint:  SANA and RLS    Primary Care Physician: Ed Dong MD    Jamie Suárez is a 43 y.o.male  was last seen at [facility] sleep lab: March 21, 2019 for an lab split study where patient was found to have obstructive sleep apnea with AHI of 46.7 events per hour.  Patient was started on auto CPAP 12-20 cm H2O he presents today for his follow-up appointment.    He had his first CPAP follow-up on June 25, 2019 in my primary care office.  Agreeable to get data from his chip card.  Compliance for at least 4 hours usage was low at 67%.  His average pressure was 15.3 cm H2O and his average AHI was 2.5 events per hour.  We discussed that his obstructive sleep apnea was well under control under the settings.  He was still having issues with restless leg syndrome however leading to hypersomnia.  Patient was advised to continue Pap use for SANA which is under control.  He was prescribed ropinirole to start.  He presents today for follow-up on RLS.    Results Review:  DME is ***.  Downloads between ***.  Average usage is ***.  Average AHI is ***.  Average AutoCPAP pressure is ***.    Current Medications:    Current Outpatient Medications:   •  albuterol 108 (90 Base) MCG/ACT inhaler, Inhale 2 puffs Every 6 (Six) Hours As Needed for Wheezing or Shortness of Air., Disp: 1 inhaler, Rfl: 0  •  fluticasone (FLONASE) 50 MCG/ACT nasal spray, USE 2 SPRAYS IN THE NOSTRIL(S) DAILY, Disp: 16 mL, Rfl: 2  •  omeprazole (priLOSEC) 40 MG capsule, TAKE 1 CAPSULE BY MOUTH EVERY DAY, Disp: 30 capsule, Rfl: 3  •  Phentermine HCl (ADIPEX-P PO), Take  by mouth., Disp: , Rfl:   •  rOPINIRole (REQUIP) 0.25 MG tablet, Take 1 tab PO hour before bedtime. Every 7 days can increase in 0.25 mg increments until max dosage of 1 mg QHS, Disp: 60 tablet, Rfl: 1   also entered in Sleep Questionnaire    Patient  has a past medical history of Bronchitis, GERD (gastroesophageal reflux disease), Hiatal hernia, and  Nausea and vomiting (1/16/2019).    Social History:    Social History     Socioeconomic History   • Marital status: Single     Spouse name: Not on file   • Number of children: 5   • Years of education: Not on file   • Highest education level: Not on file   Tobacco Use   • Smoking status: Never Smoker   • Smokeless tobacco: Never Used   Substance and Sexual Activity   • Alcohol use: No     Frequency: Never   • Drug use: No   • Sexual activity: Defer   Social History Narrative    ** Merged History Encounter **            Allergies:  Morphine and related    Review of Systems:    A complete review of systems was done and all were negative with the exception of ***    Vital Signs:  There were no vitals filed for this visit.  There is no height or weight on file to calculate BMI.    Vital Signs There were no vitals taken for this visit. There is no height or weight on file to calculate BMI.    General Alert and oriented. No acute distress noted   Pharynx/Throat Class IV Mallampati airway, large tongue, no evidence of redundant lateral pharyngeal tissue. No oral lesions. No thrush. Moist mucous membranes.   Head Normocephalic. Symmetrical. Atraumatic.    Nose No septal deviation. No drainage   Chest Wall Normal shape. Symmetric expansion with respiration. No tenderness.   Neck Trachea midline, no thyromegaly or adenopathy    Lungs Clear to auscultation bilaterally. No wheezes. No rhonchi. No rales. Respirations regular, even and unlabored.   Heart Regular rhythm and normal rate. Normal S1 and S2. No murmur   Abdomen Soft, non-tender and non-distended. Normal bowel sounds. No masses.   Extremities Moves all extremities well. No edema   Psychiatric Normal mood and affect.     Impression:  1. SANA (obstructive sleep apnea)    2. Restless legs syndrome (RLS)        Obstructive sleep apnea adequately treated with *** with good compliance and usage and no complaints of hypersomnolence.    Patient uses the CPAP device and benefits  from its use in terms of reduction of hypersomnia and snoring.Weight loss will be strongly beneficial to reduce the severity of sleep-disordered breathing.  Caution during activities that require prolonged concentration is strongly advised if sleepiness returns. Changing of PAP supplies regularly is important for effective use. Patient needs to change cushion on the mask or plugs on nasal pillows along with disposable filters once every month and change mask frame, tubing, headgear and Velcro straps every 6 months at the minimum.    Ed Dong MD  Sleep Medicine  08/07/19  9:55 AM

## 2019-08-15 DIAGNOSIS — K21.9 GASTROESOPHAGEAL REFLUX DISEASE WITHOUT ESOPHAGITIS: ICD-10-CM

## 2019-08-15 RX ORDER — OMEPRAZOLE 40 MG/1
CAPSULE, DELAYED RELEASE ORAL
Qty: 30 CAPSULE | Refills: 3 | Status: SHIPPED | OUTPATIENT
Start: 2019-08-15 | End: 2020-02-26

## 2020-01-28 ENCOUNTER — OFFICE VISIT (OUTPATIENT)
Dept: FAMILY MEDICINE CLINIC | Facility: CLINIC | Age: 45
End: 2020-01-28

## 2020-01-28 VITALS
RESPIRATION RATE: 16 BRPM | TEMPERATURE: 98.7 F | WEIGHT: 252 LBS | BODY MASS INDEX: 40.5 KG/M2 | HEART RATE: 122 BPM | OXYGEN SATURATION: 95 % | DIASTOLIC BLOOD PRESSURE: 109 MMHG | SYSTOLIC BLOOD PRESSURE: 163 MMHG | HEIGHT: 66 IN

## 2020-01-28 DIAGNOSIS — G47.33 OSA (OBSTRUCTIVE SLEEP APNEA): ICD-10-CM

## 2020-01-28 DIAGNOSIS — R11.10 VOMITING, INTRACTABILITY OF VOMITING NOT SPECIFIED, PRESENCE OF NAUSEA NOT SPECIFIED, UNSPECIFIED VOMITING TYPE: ICD-10-CM

## 2020-01-28 DIAGNOSIS — R73.01 IMPAIRED FASTING GLUCOSE: ICD-10-CM

## 2020-01-28 DIAGNOSIS — R05.9 COUGH: ICD-10-CM

## 2020-01-28 DIAGNOSIS — J01.00 ACUTE NON-RECURRENT MAXILLARY SINUSITIS: Primary | ICD-10-CM

## 2020-01-28 DIAGNOSIS — R00.0 TACHYCARDIA: ICD-10-CM

## 2020-01-28 DIAGNOSIS — R03.0 ELEVATED BLOOD PRESSURE READING IN OFFICE WITHOUT DIAGNOSIS OF HYPERTENSION: ICD-10-CM

## 2020-01-28 DIAGNOSIS — R25.2 LEG CRAMPING: ICD-10-CM

## 2020-01-28 DIAGNOSIS — E66.01 MORBID OBESITY (HCC): ICD-10-CM

## 2020-01-28 LAB
EXPIRATION DATE: NORMAL
FLUAV AG NPH QL: NEGATIVE
FLUBV AG NPH QL: NEGATIVE
INTERNAL CONTROL: NORMAL
Lab: NORMAL

## 2020-01-28 PROCEDURE — 71046 X-RAY EXAM CHEST 2 VIEWS: CPT | Performed by: FAMILY MEDICINE

## 2020-01-28 PROCEDURE — 99214 OFFICE O/P EST MOD 30 MIN: CPT | Performed by: FAMILY MEDICINE

## 2020-01-28 PROCEDURE — 87804 INFLUENZA ASSAY W/OPTIC: CPT | Performed by: FAMILY MEDICINE

## 2020-01-28 PROCEDURE — 93000 ELECTROCARDIOGRAM COMPLETE: CPT | Performed by: FAMILY MEDICINE

## 2020-01-28 RX ORDER — LEVOFLOXACIN 750 MG/1
750 TABLET ORAL DAILY
Qty: 7 TABLET | Refills: 0 | Status: SHIPPED | OUTPATIENT
Start: 2020-01-28 | End: 2020-02-04

## 2020-01-28 RX ORDER — BROMPHENIRAMINE MALEATE, PSEUDOEPHEDRINE HYDROCHLORIDE, AND DEXTROMETHORPHAN HYDROBROMIDE 2; 30; 10 MG/5ML; MG/5ML; MG/5ML
5 SYRUP ORAL 4 TIMES DAILY PRN
Qty: 118 ML | Refills: 0 | OUTPATIENT
Start: 2020-01-28 | End: 2020-04-01

## 2020-01-28 RX ORDER — ALBUTEROL SULFATE 90 UG/1
2 AEROSOL, METERED RESPIRATORY (INHALATION) EVERY 4 HOURS PRN
Qty: 8 G | Refills: 0 | OUTPATIENT
Start: 2020-01-28 | End: 2020-04-01

## 2020-01-28 NOTE — PROGRESS NOTES
Subjective   Jamie Suárez is a 44 y.o. male.     Jamie Suárez 44 y.o. male who presents for evaluation of sinus pressure and congestion, cough. Symptoms include congestion, cough described as productive of yellow sputum, headache, sinus pain, fatigue, unsure nasal discharge and vomiting.  Onset of symptoms was 3 weeks ago, gradually worsening since that time. Patient denies shortness of breath, wheezing, diarrhea, sweats.   Evaluation to date: was seen at an urgent care center Treatment to date:  antibiotics from urgent care. Vomiting only happens after a coughing fits.    Feels about the same since UC yesterday; took 2 doses of Augmentin so far. No difference.    Eating once a day and drinking fluids the rest of the day x 3 weeks now. Gatorade zero and water bottles all day.     Peeing nightly; not using CPAP regularly. BG in 9/2019 was 315 fasting. Not on any meds for DM.     Tachycardic.  Recheck improved to 113.  Manual recheck of blood pressure 140/95. Continues to deny chest pain shortness of breath headache with vision changes.  EKG shows sinus tachycardia otherwise normal EKG.       PHQ-2/PHQ-9 Depression Screening 1/28/2020   Little interest or pleasure in doing things 0   Feeling down, depressed, or hopeless 0   Total Score 0       The following portions of the patient's history were reviewed and updated as appropriate: allergies, current medications, past family history, past medical history, past social history, past surgical history and problem list.    Review of Systems   Constitutional: Positive for diaphoresis and fatigue. Negative for chills and fever.   HENT: Positive for congestion and sinus pressure. Negative for ear discharge and ear pain.    Respiratory: Positive for cough. Negative for shortness of breath, wheezing and stridor.    Cardiovascular: Negative for chest pain, palpitations and leg swelling.   Gastrointestinal: Positive for vomiting (Posttussive emesis). Negative for abdominal  pain, blood in stool, constipation, diarrhea and nausea.   Genitourinary: Positive for nocturia.   Psychiatric/Behavioral: Positive for sleep disturbance.       Objective   Physical Exam   Constitutional: He is oriented to person, place, and time. He appears well-developed and well-nourished.   HENT:   Head: Normocephalic and atraumatic.   Right Ear: Hearing, external ear and ear canal normal.   Left Ear: Hearing, external ear and ear canal normal.   Nose: Mucosal edema present. Right sinus exhibits maxillary sinus tenderness. Right sinus exhibits no frontal sinus tenderness. Left sinus exhibits maxillary sinus tenderness. Left sinus exhibits no frontal sinus tenderness.   Mouth/Throat: Uvula is midline and mucous membranes are normal. Posterior oropharyngeal erythema present. No oropharyngeal exudate, posterior oropharyngeal edema or tonsillar abscesses.   Eyes: Pupils are equal, round, and reactive to light. Conjunctivae and EOM are normal.   Cardiovascular: Normal rate and regular rhythm.   Pulmonary/Chest: Effort normal and breath sounds normal. No stridor. No respiratory distress. He has no decreased breath sounds. He has no wheezes. He has no rhonchi. He has no rales.   Musculoskeletal: Normal range of motion.   Lymphadenopathy:        Right cervical: No superficial cervical adenopathy present.       Left cervical: No superficial cervical adenopathy present.   Neurological: He is alert and oriented to person, place, and time.   Psychiatric: He has a normal mood and affect. His speech is normal.     ECG 12 Lead  Date/Time: 1/28/2020 10:28 AM  Performed by: Ed Dong MD  Authorized by: Ed Dong MD   Comparison: not compared with previous ECG   Rhythm: sinus tachycardia  Rate: tachycardic  Conduction: conduction normal  ST Segments: ST segments normal  T Waves: T waves normal  QRS axis: normal  Clinical impression comment: sinus tachycardia                  No results found for: COLORU, CLARITYU,  SPECGRAV, PHUR, LEUKOCYTESUR, NITRITE, PROTEINPOCUA, GLUCOSEUR, KETONESU, UROBILINOGEN, BILIRUBINUR, RBCUR      Assessment/Plan     Jamie was seen today for cough, vomiting and cramps.    Diagnoses and all orders for this visit:    Acute non-recurrent maxillary sinusitis  -     levoFLOXacin (LEVAQUIN) 750 MG tablet; Take 1 tablet by mouth Daily for 7 days.  -     albuterol sulfate  (90 Base) MCG/ACT inhaler; Inhale 2 puffs Every 4 (Four) Hours As Needed for Wheezing.  -     brompheniramine-pseudoephedrine-DM 30-2-10 MG/5ML syrup; Take 5 mL by mouth 4 (Four) Times a Day As Needed for Cough.    Cough  -     POC Influenza A / B  -     XR Chest PA & Lateral (In Office)    Vomiting, intractability of vomiting not specified, presence of nausea not specified, unspecified vomiting type  -     POC Influenza A / B    Morbid obesity (CMS/HCC)  -     Hemoglobin A1c  -     Lipid Panel    SANA (obstructive sleep apnea)    Leg cramping  -     Comprehensive Metabolic Panel    Tachycardia  -     ECG 12 Lead    Impaired fasting glucose  -     Hemoglobin A1c    Elevated blood pressure reading in office without diagnosis of hypertension    Change Augmentin and levofloxacin as noted above.  Albuterol however to use as needed.  Change Tessalon Perles to Bromfed cough syrup.  Follow-up chest x-ray read.  Go to ER for new or worsening symptoms.    High clinical suspicion for type 2 diabetes mellitus.  Follow-up A1c today.  Patient is not using CPAP as directed.  With high blood pressure reading today strongly advised to get back into routine for this.  Physical also be contributing to nocturia.    Follow-up labs.  Advised to go back to regular diet instead of the new day started 3 weeks ago.  Properly portioned healthy meals 3 times a day.  Maintain adequate hydration.  Again go to ER for new or worsening symptoms.    Return to clinic in 4 to 6 weeks for follow-up on elevated blood pressure reading with blood pressure log.

## 2020-01-29 DIAGNOSIS — E11.9 NEW ONSET TYPE 2 DIABETES MELLITUS (HCC): Primary | ICD-10-CM

## 2020-01-29 LAB
ALBUMIN SERPL-MCNC: 4.3 G/DL (ref 4–5)
ALBUMIN/GLOB SERPL: 1.3 {RATIO} (ref 1.2–2.2)
ALP SERPL-CCNC: 84 IU/L (ref 39–117)
ALT SERPL-CCNC: 50 IU/L (ref 0–44)
AST SERPL-CCNC: 26 IU/L (ref 0–40)
BILIRUB SERPL-MCNC: 0.4 MG/DL (ref 0–1.2)
BUN SERPL-MCNC: 21 MG/DL (ref 6–24)
BUN/CREAT SERPL: 15 (ref 9–20)
CALCIUM SERPL-MCNC: 9.7 MG/DL (ref 8.7–10.2)
CHLORIDE SERPL-SCNC: 96 MMOL/L (ref 96–106)
CHOLEST SERPL-MCNC: 168 MG/DL (ref 100–199)
CO2 SERPL-SCNC: 22 MMOL/L (ref 20–29)
CREAT SERPL-MCNC: 1.44 MG/DL (ref 0.76–1.27)
GLOBULIN SER CALC-MCNC: 3.2 G/DL (ref 1.5–4.5)
GLUCOSE SERPL-MCNC: 473 MG/DL (ref 65–99)
HBA1C MFR BLD: 9.3 % (ref 4.8–5.6)
HDLC SERPL-MCNC: 32 MG/DL
LDLC SERPL CALC-MCNC: 72 MG/DL (ref 0–99)
POTASSIUM SERPL-SCNC: 5 MMOL/L (ref 3.5–5.2)
PROT SERPL-MCNC: 7.5 G/DL (ref 6–8.5)
SODIUM SERPL-SCNC: 136 MMOL/L (ref 134–144)
TRIGL SERPL-MCNC: 322 MG/DL (ref 0–149)
VLDLC SERPL CALC-MCNC: 64 MG/DL (ref 5–40)

## 2020-01-29 NOTE — PROGRESS NOTES
A1c is 9.3%.  Patient is a diabetic and needs to be started on medication.  I will prescribe metformin 500 mg tablet.  Take 1 tablet a day for 5 days.  Then increase to 1 tablet twice a day for 7 days.  Then increase to 2 tablets in the morning and 1 tablet in the evening for 7 days.  Then increase to 2 tablets twice a day.    Total cholesterol and LDL are normal.  Triglycerides are little bit higher and HDL is low.  Continue to work on exercising more to improve these numbers.    Slight dip in kidney function.    Reminder he must get back to using his CPAP regularly.  Must also keep blood pressure log to bring to next appointment.

## 2020-01-31 ENCOUNTER — TELEPHONE (OUTPATIENT)
Dept: FAMILY MEDICINE CLINIC | Facility: CLINIC | Age: 45
End: 2020-01-31

## 2020-02-25 DIAGNOSIS — K21.9 GASTROESOPHAGEAL REFLUX DISEASE WITHOUT ESOPHAGITIS: ICD-10-CM

## 2020-02-26 RX ORDER — OMEPRAZOLE 40 MG/1
CAPSULE, DELAYED RELEASE ORAL
Qty: 30 CAPSULE | Refills: 0 | Status: SHIPPED | OUTPATIENT
Start: 2020-02-26 | End: 2020-03-27

## 2020-03-27 DIAGNOSIS — K21.9 GASTROESOPHAGEAL REFLUX DISEASE WITHOUT ESOPHAGITIS: ICD-10-CM

## 2020-03-27 RX ORDER — OMEPRAZOLE 40 MG/1
CAPSULE, DELAYED RELEASE ORAL
Qty: 30 CAPSULE | Refills: 0 | Status: SHIPPED | OUTPATIENT
Start: 2020-03-27 | End: 2020-04-22

## 2020-03-30 ENCOUNTER — DOCUMENTATION (OUTPATIENT)
Dept: FAMILY MEDICINE CLINIC | Facility: CLINIC | Age: 45
End: 2020-03-30

## 2020-03-30 ENCOUNTER — TELEMEDICINE - AUDIO (OUTPATIENT)
Dept: FAMILY MEDICINE CLINIC | Facility: CLINIC | Age: 45
End: 2020-03-30

## 2020-03-30 DIAGNOSIS — E11.9 NEW ONSET TYPE 2 DIABETES MELLITUS (HCC): ICD-10-CM

## 2020-03-30 DIAGNOSIS — R20.0 NUMBNESS OF RIGHT ANTERIOR THIGH: Primary | ICD-10-CM

## 2020-03-30 PROCEDURE — 72100 X-RAY EXAM L-S SPINE 2/3 VWS: CPT | Performed by: FAMILY MEDICINE

## 2020-03-30 PROCEDURE — 99213 OFFICE O/P EST LOW 20 MIN: CPT | Performed by: FAMILY MEDICINE

## 2020-03-30 PROCEDURE — 73502 X-RAY EXAM HIP UNI 2-3 VIEWS: CPT | Performed by: FAMILY MEDICINE

## 2020-03-30 NOTE — PATIENT INSTRUCTIONS
- Ordered X-rays and labs to be completed. Appointment not needed for this  - Continue to work on diet/exercise and take medications as prescribed  - Call us or go to ER for new or worsening symptoms especially muscle weakness, gait instability and/or bowel/bladder incontinence.

## 2020-03-30 NOTE — PROGRESS NOTES
Jamie Blueaga    1975  [id number]  Time spent reviewing E-Visit Questionnaire-5-10 minutes.  I have reviewed the e-Visit questionnaire and patient's answers, my assessment and plan documented below.    HPI    This visit was completed via telephone due to restrictions of the COVID-19 pandemic. All issues as below were discussed and addressed but no physical exam was performed. If it was felt the patient should be evaluated in clinic then they were directed there. The patient verbally consented to visit.     44 year old male with history of new onset T2DM, morbid obesity and HTN presents with concern for R anterior thigh numbness x 2 weeks. Denies any inciting trauma/injury/heavy lifting recently. Denies any history of trauma/injury to R thigh/hip. Reports numbness is constant. No pain. Symptoms are unchanged. No association with low back pain or R hip pain. He notes he has been walking a lot more recently. Since starting Metformin he has been losing weight; unsure how much. He feels he is drinking a lot more water. Trying to watch his diet as well. Denies any issues with gait. Denies any bowel/bladder incontinence.Denies prior episodes like this. Denies any association with how much he walked vs rested during the day.    Will order X-rays of R hip and lower back to further assess. PT offices closed right now due to COVID-19 pandemic. Will hold off on referral for now. Will also follow up A1C and CMP. Advised patient these are not emergent/urgent tests to get done however once they are done they can help with our assessment.     Call us or go to ER for new worsening symptoms especially muscle weakness, gait instability and/or bowel/bladder incontinence. Patient expressed understanding and agreeable to plan.    At this time the patient is not suspected of having COVID-19 infection. Answered patient questions about COVID-10 including signs and symptoms, self home care and warning signs to look for especially the  worsening of symptoms and respiratory distress by day 8/9. Advised if seeks care to call firs to allow for proper isolation precautions.     Diagnoses and all orders for this visit:    1. Numbness of right anterior thigh (Primary)  -     XR Hip With or Without Pelvis 2 - 3 View Right; Future  -     XR Spine Lumbar 2 or 3 View (In Office); Future    2. New onset type 2 diabetes mellitus (CMS/[facility])  -     Comprehensive Metabolic Panel  -     Hemoglobin A1c         Any medications prescribed have been sent electronically to   [facility]/pharmacy #[id number] - [place] - [address] AT [facility] - [phone]  - [phone]   [address]  [facility] [place] [address]  Phone: [phone] Fax: [phone]        Ed Dong MD  03/30/20  10:40 AM

## 2020-03-31 LAB
ALBUMIN SERPL-MCNC: 4.1 G/DL (ref 3.5–5.2)
ALBUMIN/GLOB SERPL: 1.2 G/DL
ALP SERPL-CCNC: 91 U/L (ref 39–117)
ALT SERPL-CCNC: 29 U/L (ref 1–41)
AST SERPL-CCNC: 13 U/L (ref 1–40)
BILIRUB SERPL-MCNC: 0.5 MG/DL (ref 0.2–1.2)
BUN SERPL-MCNC: 16 MG/DL (ref 6–20)
BUN/CREAT SERPL: 15.1 (ref 7–25)
CALCIUM SERPL-MCNC: 9.6 MG/DL (ref 8.6–10.5)
CHLORIDE SERPL-SCNC: 94 MMOL/L (ref 98–107)
CO2 SERPL-SCNC: 22.7 MMOL/L (ref 22–29)
CREAT SERPL-MCNC: 1.06 MG/DL (ref 0.76–1.27)
GLOBULIN SER CALC-MCNC: 3.4 GM/DL
GLUCOSE SERPL-MCNC: 442 MG/DL (ref 65–99)
HBA1C MFR BLD: 14.01 % (ref 4.8–5.6)
Lab: NORMAL
POTASSIUM SERPL-SCNC: 4.5 MMOL/L (ref 3.5–5.2)
PROT SERPL-MCNC: 7.5 G/DL (ref 6–8.5)
SODIUM SERPL-SCNC: 133 MMOL/L (ref 136–145)

## 2020-03-31 NOTE — PROGRESS NOTES
Sugar is very very elevated.  A1c is more elevated now than it was 2 months ago.  2 months ago it was 9.3% and now is 14.01%.  I am very concerned about this as this means his type 2 diabetes mellitus is very much uncontrolled; more uncontrolled than it was when we first diagnosed him.    How exactly is he taking his metformin?  He was prescribed 500 mg tablets and there were instructions to titrate up slowly to get to a point where he is taking 2 tablets twice a day.  I need to know how he is taking it even if is not taking it correctly so I can make an appropriate decision about adding another medication or not.  He must work on diet and exercise.  Complications from untreated diabetes includes neuropathy which can lead to blindness and erectile dysfunction.  Can lead to severe kidney disease and several other complications which are difficult to reverse.

## 2020-04-20 DIAGNOSIS — K21.9 GASTROESOPHAGEAL REFLUX DISEASE WITHOUT ESOPHAGITIS: ICD-10-CM

## 2020-04-20 RX ORDER — OMEPRAZOLE 40 MG/1
CAPSULE, DELAYED RELEASE ORAL
Qty: 30 CAPSULE | Refills: 0 | OUTPATIENT
Start: 2020-04-20

## 2020-04-22 DIAGNOSIS — K21.9 GASTROESOPHAGEAL REFLUX DISEASE WITHOUT ESOPHAGITIS: ICD-10-CM

## 2020-04-22 RX ORDER — OMEPRAZOLE 40 MG/1
CAPSULE, DELAYED RELEASE ORAL
Qty: 30 CAPSULE | Refills: 0 | Status: SHIPPED | OUTPATIENT
Start: 2020-04-22 | End: 2020-05-19

## 2020-04-25 DIAGNOSIS — E11.9 NEW ONSET TYPE 2 DIABETES MELLITUS (HCC): ICD-10-CM

## 2020-04-30 ENCOUNTER — OFFICE VISIT (OUTPATIENT)
Dept: FAMILY MEDICINE CLINIC | Facility: CLINIC | Age: 45
End: 2020-04-30

## 2020-04-30 DIAGNOSIS — E11.9 NEW ONSET TYPE 2 DIABETES MELLITUS (HCC): ICD-10-CM

## 2020-04-30 PROCEDURE — 99441 PR PHYS/QHP TELEPHONE EVALUATION 5-10 MIN: CPT | Performed by: FAMILY MEDICINE

## 2020-04-30 NOTE — PROGRESS NOTES
Jamie Jose Armando    1975  [id number]  Time spent reviewing E-Visit Questionnaire-5-10 minutes.  I have reviewed the e-Visit questionnaire and patient's answers, my assessment and plan documented below.    HPI    This visit was completed via telephone due to the restriction of the COVID-19 pandemic.  All issues as below were discussed and addressed but no physical exam was performed.  If it was felt that the patient should be evaluated in clinic and they were directed there.  The patient verbally consented to visit.    44-year-old male presents today for follow-up on type 2 diabetes mellitus and numbness of right anterior thigh.    At last visit x-rays were done and labs were ordered.  A1c was elevated at 14% which was much higher than previous check.  Patient reported he was not taking metformin correctly.  Dosages are reviewed with him at the end of March with plans to recheck A1c in 3 months.  Patient was given strict instructions to check blood sugar daily; fasting morning and postprandial.  X-ray showed mild to moderate degenerative changes in low back as well as arthritis in hips bilaterally.  When patient was informed of x-ray results we discussed considering MRI given his symptoms however patient refused saying want to wait as numbness was getting better.    Since last visit patient was seen in urgent care in the beginning of April and diagnosed with abscess of right thigh.  This was incised and drained and patient was prescribed Augmentin.    Patient reports taking correct doses of metformin.  A lot better; p.m. last.  Feels like he has more energy.    Since his abscess was drained he also feels better in terms of no more thigh numbness.  No issues with healing.  He did complete the course of antibiotics.  He is not checking his blood sugar regularly as advised.  Advised him to do this.  Due for recheck of A1c at the end of June.    Diagnoses and all orders for this visit:    1. New onset type 2 diabetes  mellitus (CMS/[facility])  -     metFORMIN (GLUCOPHAGE) 1000 MG tablet; Take 1 tablet by mouth 2 (Two) Times a Day With Meals.  Dispense: 60 tablet; Refill: 3    Continue metformin 1000 mg twice daily.  Strongly advised patient to check blood sugar fasting every morning and postprandial 1 to 2 hours after dinner every night.  Continue to work on diet and exercise.  Due for recheck of A1c at the end of June.  Return to clinic in about 2 months for follow-up on diabetes or sooner if needed.    Any medications prescribed have been sent electronically to   [facility]/pharmacy #[id number] - [facility] [place] - [address] AT [facility] - [phone]  - [phone]   [address]  [facility] [address]  Phone: [phone] Fax: [phone]        Ed Dong MD  04/30/20  1:07 PM

## 2020-05-12 ENCOUNTER — OFFICE VISIT (OUTPATIENT)
Dept: FAMILY MEDICINE CLINIC | Facility: CLINIC | Age: 45
End: 2020-05-12

## 2020-05-12 DIAGNOSIS — E11.65 TYPE 2 DIABETES MELLITUS WITH HYPERGLYCEMIA, WITHOUT LONG-TERM CURRENT USE OF INSULIN (HCC): Primary | ICD-10-CM

## 2020-05-12 PROCEDURE — 99441 PR PHYS/QHP TELEPHONE EVALUATION 5-10 MIN: CPT | Performed by: FAMILY MEDICINE

## 2020-05-12 NOTE — PROGRESS NOTES
Jamie Jose Armando    1975  [id number]  Time spent reviewing E-Visit Questionnaire-5-10 minutes.  I have reviewed the e-Visit questionnaire and patient's answers, my assessment and plan documented below.    HPI    This visit was completed via telephone due to the restriction of the COVID-19 pandemic.  All issues as below were discussed and addressed but no physical exam was performed.  It was felt that the patient should be evaluated in clinic and they were directed there.  The patient verbally consented to visit.    44-year-old male presents today to discuss high blood sugars.  Patient has a history of Type 2 diabetes mellitus.  Last had telephone visit on April 30, 2020.  Due for recheck of A1c at the end of June.  At first she was not taking his metformin correctly and A1c jumped.  He has now been taking metformin 1000 mg twice daily for about the past month.  Advised to check fasting blood sugar every morning and postprandial 1 to 2 hours after dinner every night.  At last visit discussed adding glipizide however wanted patient to take his metformin appropriately and follow-up with me with some blood sugar readings first.    Today patient reports blood sugars have been ranging in the 300s for the past 3 days.  This is regardless of if it is a fasting blood sugar or postprandial.  He reports that he feels fine otherwise.    Diagnoses and all orders for this visit:    1. Type 2 diabetes mellitus with hyperglycemia, without long-term current use of insulin (CMS/[facility]) (Primary)    Patient advised to go to urgent care to get urinalysis done to see if there are any ketones in his urine.  He will likely need a medication of glipizide added on as well however primary concern right now is how high his blood sugar is in seeing if there are any ketones in his urine.  Patient said he would go to urgent care right now for this.  If he feels badly at all or fatigued he will go to ER instead.    Any medications prescribed have  been sent electronically to   [facility]/pharmacy #[id number] - [place], [place] - [address] AT [facility] - [phone]  - [phone]   [address]  [place] [address]  Phone: [phone] Fax: [phone]        Ed Dong MD  05/12/20  9:06 AM

## 2020-05-18 ENCOUNTER — TELEPHONE (OUTPATIENT)
Dept: FAMILY MEDICINE CLINIC | Facility: CLINIC | Age: 45
End: 2020-05-18

## 2020-05-18 DIAGNOSIS — E11.65 TYPE 2 DIABETES MELLITUS WITH HYPERGLYCEMIA, WITHOUT LONG-TERM CURRENT USE OF INSULIN (HCC): Primary | ICD-10-CM

## 2020-05-19 DIAGNOSIS — K21.9 GASTROESOPHAGEAL REFLUX DISEASE WITHOUT ESOPHAGITIS: ICD-10-CM

## 2020-05-19 RX ORDER — OMEPRAZOLE 40 MG/1
CAPSULE, DELAYED RELEASE ORAL
Qty: 30 CAPSULE | Refills: 0 | Status: SHIPPED | OUTPATIENT
Start: 2020-05-19 | End: 2020-06-22

## 2020-05-26 ENCOUNTER — OFFICE VISIT (OUTPATIENT)
Dept: FAMILY MEDICINE CLINIC | Facility: CLINIC | Age: 45
End: 2020-05-26

## 2020-05-26 VITALS
WEIGHT: 228 LBS | HEART RATE: 98 BPM | RESPIRATION RATE: 18 BRPM | TEMPERATURE: 97.1 F | BODY MASS INDEX: 36.64 KG/M2 | DIASTOLIC BLOOD PRESSURE: 95 MMHG | HEIGHT: 66 IN | SYSTOLIC BLOOD PRESSURE: 142 MMHG

## 2020-05-26 DIAGNOSIS — E11.65 TYPE 2 DIABETES MELLITUS WITH HYPERGLYCEMIA, WITHOUT LONG-TERM CURRENT USE OF INSULIN (HCC): Primary | ICD-10-CM

## 2020-05-26 PROCEDURE — 99214 OFFICE O/P EST MOD 30 MIN: CPT | Performed by: FAMILY MEDICINE

## 2020-05-26 RX ORDER — GLIPIZIDE 10 MG/1
10 TABLET, FILM COATED, EXTENDED RELEASE ORAL DAILY
Qty: 30 TABLET | Refills: 2 | Status: SHIPPED | OUTPATIENT
Start: 2020-05-26 | End: 2020-05-28

## 2020-05-26 NOTE — PROGRESS NOTES
"Subjective   Jamie Suárez is a 44 y.o. male.     CC: Office Visit for Management of DM    History of Present Illness     Pt of Dr Dong's (seen today as she's out of town) here to discuss his severely elevated A1C, as well as glucose numbers. Pt currently on Metformin. Pt has been changing his diety markedly (better) and has been walking for exercise.  Pt seeing eye MD later today.    The following portions of the patient's history were reviewed and updated as appropriate: allergies, current medications, past family history, past medical history, past social history, past surgical history and problem list.    Review of Systems   Constitutional: Negative for activity change, chills, fatigue and fever.   Respiratory: Negative for cough and shortness of breath.    Cardiovascular: Negative for chest pain and palpitations.   Gastrointestinal: Negative for abdominal pain.   Endocrine: Negative for cold intolerance, polydipsia and polyuria.   Psychiatric/Behavioral: Negative for behavioral problems and dysphoric mood. The patient is not nervous/anxious.      /95   Pulse 98   Temp 97.1 °F (36.2 °C) (Oral)   Resp 18   Ht 167.6 cm (66\")   Wt 103 kg (228 lb)   BMI 36.80 kg/m²     Objective   Physical Exam   Constitutional: He appears well-developed and well-nourished.   Neck: Neck supple. No thyromegaly present.   Cardiovascular: Normal rate and regular rhythm.   No murmur heard.  Pulmonary/Chest: Effort normal and breath sounds normal.   Abdominal: Bowel sounds are normal. There is no tenderness.   Psychiatric: He has a normal mood and affect. His behavior is normal.   Nursing note and vitals reviewed.  Labs reviewed with pt today during visit. All questions answered.  Pt seeing endocrine on Thursday this week.    Assessment/Plan   Jamie was seen today for diabetes.    Diagnoses and all orders for this visit:    Type 2 diabetes mellitus with hyperglycemia, without long-term current use of insulin " (CMS/[facility])  Comments:  uncontrolled  Orders:  -     Basic Metabolic Panel  -     Hemoglobin A1c  -     Lipid Panel  -     glipizide (GLUCOTROL XL) 10 MG 24 hr tablet; Take 1 tablet by mouth Daily.    Pt works on a [employer] and is dangerous regarding his visual issues.   Will keep off work until 6/8/20 in order to get vision better.

## 2020-05-28 ENCOUNTER — OFFICE VISIT (OUTPATIENT)
Dept: ENDOCRINOLOGY | Age: 45
End: 2020-05-28

## 2020-05-28 DIAGNOSIS — E66.09 CLASS 2 OBESITY DUE TO EXCESS CALORIES WITHOUT SERIOUS COMORBIDITY WITH BODY MASS INDEX (BMI) OF 36.0 TO 36.9 IN ADULT: ICD-10-CM

## 2020-05-28 DIAGNOSIS — E11.65 TYPE 2 DIABETES MELLITUS WITH HYPERGLYCEMIA, WITHOUT LONG-TERM CURRENT USE OF INSULIN (HCC): ICD-10-CM

## 2020-05-28 DIAGNOSIS — IMO0002 DM (DIABETES MELLITUS), TYPE 2, UNCONTROLLED W/NEUROLOGIC COMPLICATION: Primary | ICD-10-CM

## 2020-05-28 PROCEDURE — 99204 OFFICE O/P NEW MOD 45 MIN: CPT | Performed by: INTERNAL MEDICINE

## 2020-05-28 RX ORDER — LANCETS
EACH MISCELLANEOUS
Qty: 100 EACH | Refills: 2 | Status: SHIPPED | OUTPATIENT
Start: 2020-05-28

## 2020-05-28 RX ORDER — GLIPIZIDE 10 MG/1
10 TABLET, FILM COATED, EXTENDED RELEASE ORAL
Qty: 60 TABLET | Refills: 2 | Status: SHIPPED | OUTPATIENT
Start: 2020-05-28 | End: 2020-06-17 | Stop reason: SDUPTHER

## 2020-05-28 RX ORDER — BLOOD-GLUCOSE METER
EACH MISCELLANEOUS
Qty: 1 KIT | Refills: 0 | Status: SHIPPED | OUTPATIENT
Start: 2020-05-28

## 2020-05-28 NOTE — PROGRESS NOTES
Chief Complaint   Patient presents with   • Diabetes   NEW PATIENT/ TYPE 2 DIABETES BRITTNEY Suárez 44 y.o. presents with Type 2 dm as a new patient. Consulted by Dr. Dong.    Type 2 dm - Diagnosed in March 2020 per pt but he did have elevated A1c in January.   This is a video visit.   Today in visit pt reports being on glipizide 10 mg oral daily, metformin thousand milligrams twice daily.  He started on metformin in April 2020.  Glipizide was just started yesterday.  Does not have a glucometer and as a result has not been checking his blood sugars.  Sensor -none  Dm retinopathy -complains of blurry vision, never had diabetic retinopathy screening.  Last eye exam -has upcoming eye examination visit in June 2020 with ophthalmologist  Dm nephropathy -none  Dm neuropathy -occasional tingling, numbness and burning sensation,Dm neuropathy meds -none  CAD -no CVA -no  Episodes of hypoglycemia -does not check  Pt is physically active.  Pt tries to follow DM diet for most part.  Never had diabetic education  On [employer].    You have chosen to receive care through a telehealth visit.  Do you consent to use a video/audio connection for your medical care today? Yes    Reviewed primary care physician's/consulting physician documentation and lab results :     I have reviewed the patient's allergies, medicines, past medical hx, family hx and social hx in detail.    Past Medical History:   Diagnosis Date   • Bronchitis    • Diabetes mellitus (CMS/[facility])    • GERD (gastroesophageal reflux disease)     Reflux   • Hiatal hernia    • Nausea and vomiting 1/16/2019    Added automatically from request for surgery [id number]       Family History   Problem Relation Age of Onset   • Heart disease Mother    • Hypertension Mother    • Diabetes Father    • Malig Hyperthermia Neg Hx        Social History     Socioeconomic History   • Marital status: Single     Spouse name: Not on file   • Number of children: 5   • Years of education: Not on  file   • Highest education level: Not on file   Tobacco Use   • Smoking status: Never Smoker   • Smokeless tobacco: Never Used   Substance and Sexual Activity   • Alcohol use: No     Frequency: Never   • Drug use: No   • Sexual activity: Defer   Social History Narrative    ** Merged History Encounter **            Allergies   Allergen Reactions   • Morphine And Related Rash     He had a rash with morphine but no  Problem  with codiene         Current Outpatient Medications:   •  glipizide (GLUCOTROL XL) 10 MG 24 hr tablet, Take 1 tablet by mouth 2 (Two) Times a Day Before Meals., Disp: 60 tablet, Rfl: 2  •  metFORMIN (GLUCOPHAGE) 1000 MG tablet, Take 1 tablet by mouth 2 (Two) Times a Day With Meals., Disp: 60 tablet, Rfl: 3  •  omeprazole (priLOSEC) 40 MG capsule, TAKE 1 CAPSULE BY MOUTH EVERY DAY, Disp: 30 capsule, Rfl: 0  •  Dapagliflozin Propanediol (Farxiga) 10 MG tablet, Take 10 mg by mouth Daily., Disp: 30 tablet, Rfl: 11  •  SITagliptin (Januvia) 100 MG tablet, Take 1 tablet by mouth Daily., Disp: 30 tablet, Rfl: 11    Review of Systems   Constitutional: Positive for fatigue. Negative for appetite change and fever.   Eyes: Positive for visual disturbance.   Respiratory: Negative for shortness of breath.    Cardiovascular: Negative for palpitations and leg swelling.   Gastrointestinal: Negative for abdominal pain and vomiting.   Endocrine: Negative for polydipsia and polyuria.   Musculoskeletal: Negative for joint swelling and neck pain.   Skin: Negative for rash.   Neurological: Positive for dizziness and light-headedness. Negative for weakness and numbness.   Psychiatric/Behavioral: Negative for behavioral problems.     I have reviewed the ROS as documented by the MA; Seth Myers MD.      Objective:     There were no vitals taken for this visit.    Physical Exam  General appearance - no distress  Eyes-PERRLA, anicteric sclera  Ear nose and throat-external ears and nose normal.  Noted midline  trachea.  Respiratory-normal chest on inspection.  No respiratory distress noted.  Musculoskeletal-no edema.  Hammer toes noted.  Skin-no rashes.  Neuro-alert and oriented x3         Results Review:    I reviewed the patient's new clinical results.    Admission on 04/01/2020, Discharged on 04/01/2020   Component Date Value Ref Range Status   • Culture 04/01/2020 Final report   Final   • Result 1 04/01/2020 Comment   Final    No growth in 36 - 48 hours.       Jamie was seen today for diabetes.    Diagnoses and all orders for this visit:    DM (diabetes mellitus), type 2, uncontrolled w/neurologic complication (CMS/[facility])    Type 2 diabetes mellitus with hyperglycemia, without long-term current use of insulin (CMS/[facility])  Comments:  uncontrolled  Orders:  -     glipizide (GLUCOTROL XL) 10 MG 24 hr tablet; Take 1 tablet by mouth 2 (Two) Times a Day Before Meals.    Class 2 obesity due to excess calories without serious comorbidity with body mass index (BMI) of 36.0 to 36.9 in adult    Other orders  -     SITagliptin (Januvia) 100 MG tablet; Take 1 tablet by mouth Daily.  -     Dapagliflozin Propanediol (Farxiga) 10 MG tablet; Take 10 mg by mouth Daily.      Type 2 diabetes mellitus-severely uncontrolled  HbA1c is very high  Discussed with the patient the risks of uncontrolled diabetes and diabetic complications  Advised the patient to start checking his blood sugars 3-4 times a day  Discussed with the patient the importance of diabetic diet and exercise in the management of his diabetes.  Increase glipizide to 10 mg twice daily  Continue metformin  Add Farxiga and Januvia.  Discussed the benefits and the side effects of these medications.    Obesity  Discussed calorie counting - limit to 1500 joelle per day and exercising 2-3 times a week.    Patient will come into the office sometime within the next few weeks for CGM placement.  Thank you for asking me to see your patient, Jamie Suárez in consultation.        Seth Myers  "MD  05/28/20    EMR Dragon / transcription disclaimer:     \"Dictated utilizing Dragon dictation\".   "

## 2020-06-05 ENCOUNTER — TELEPHONE (OUTPATIENT)
Dept: FAMILY MEDICINE CLINIC | Facility: CLINIC | Age: 45
End: 2020-06-05

## 2020-06-10 ENCOUNTER — TELEPHONE (OUTPATIENT)
Dept: ENDOCRINOLOGY | Age: 45
End: 2020-06-10

## 2020-06-17 DIAGNOSIS — E11.65 TYPE 2 DIABETES MELLITUS WITH HYPERGLYCEMIA, WITHOUT LONG-TERM CURRENT USE OF INSULIN (HCC): ICD-10-CM

## 2020-06-17 RX ORDER — GLIPIZIDE 10 MG/1
10 TABLET, FILM COATED, EXTENDED RELEASE ORAL
Qty: 60 TABLET | Refills: 2 | Status: SHIPPED | OUTPATIENT
Start: 2020-06-17 | End: 2020-09-15

## 2020-06-21 DIAGNOSIS — K21.9 GASTROESOPHAGEAL REFLUX DISEASE WITHOUT ESOPHAGITIS: ICD-10-CM

## 2020-06-22 ENCOUNTER — TELEPHONE (OUTPATIENT)
Dept: ENDOCRINOLOGY | Age: 45
End: 2020-06-22

## 2020-06-22 RX ORDER — OMEPRAZOLE 40 MG/1
CAPSULE, DELAYED RELEASE ORAL
Qty: 30 CAPSULE | Refills: 0 | Status: SHIPPED | OUTPATIENT
Start: 2020-06-22 | End: 2020-07-20

## 2020-06-22 NOTE — TELEPHONE ENCOUNTER
Patient called stating that when he had a video visit with Dr Myers she wanted him to come in for a cgm patient will come in on Weds to have one placed   Patient stated his blood sugar are doing good

## 2020-07-19 DIAGNOSIS — K21.9 GASTROESOPHAGEAL REFLUX DISEASE WITHOUT ESOPHAGITIS: ICD-10-CM

## 2020-07-20 ENCOUNTER — TREATMENT (OUTPATIENT)
Dept: ENDOCRINOLOGY | Age: 45
End: 2020-07-20

## 2020-07-20 DIAGNOSIS — IMO0002 DM (DIABETES MELLITUS), TYPE 2, UNCONTROLLED W/NEUROLOGIC COMPLICATION: ICD-10-CM

## 2020-07-20 PROCEDURE — 95251 CONT GLUC MNTR ANALYSIS I&R: CPT | Performed by: INTERNAL MEDICINE

## 2020-07-20 PROCEDURE — 95250 CONT GLUC MNTR PHYS/QHP EQP: CPT | Performed by: INTERNAL MEDICINE

## 2020-07-20 RX ORDER — OMEPRAZOLE 40 MG/1
CAPSULE, DELAYED RELEASE ORAL
Qty: 30 CAPSULE | Refills: 0 | Status: SHIPPED | OUTPATIENT
Start: 2020-07-20 | End: 2020-09-28

## 2020-07-21 NOTE — PROGRESS NOTES
Continues glucose monitoring data    Continuous glucose monitoring was placed on June 24    Patient wore continuous glucose monitoring-free style kayden Pro from June 24-July 8  Average blood glucose reading was 97  Estimated HbA1c 6%  Likelihood of low blood glucose levels was moderate  Likelihood of high blood glucose levels was mild  Blood glucose trends showed decent control    Current treatment regimen  glipizide to 10 mg twice daily  Continue metformin  Farxiga and Januvia.      Changes to the treatment regimen  Change glipizide to 5 mg twice daily with meals.  Continue metformin  Continue Farxiga and Januvia.    Discussed with the patient that his blood sugars have significantly improved.  Might even consider backing off on some of the medications during his office visit if he continues to do this well by his next visit.

## 2020-08-16 DIAGNOSIS — E11.9 NEW ONSET TYPE 2 DIABETES MELLITUS (HCC): ICD-10-CM

## 2020-08-18 ENCOUNTER — OFFICE VISIT (OUTPATIENT)
Dept: FAMILY MEDICINE CLINIC | Facility: CLINIC | Age: 45
End: 2020-08-18

## 2020-08-18 VITALS
SYSTOLIC BLOOD PRESSURE: 152 MMHG | RESPIRATION RATE: 16 BRPM | HEART RATE: 93 BPM | TEMPERATURE: 97.1 F | DIASTOLIC BLOOD PRESSURE: 104 MMHG | OXYGEN SATURATION: 98 % | BODY MASS INDEX: 37.93 KG/M2 | WEIGHT: 236 LBS | HEIGHT: 66 IN

## 2020-08-18 DIAGNOSIS — I10 ESSENTIAL HYPERTENSION: ICD-10-CM

## 2020-08-18 DIAGNOSIS — K21.9 GASTROESOPHAGEAL REFLUX DISEASE WITHOUT ESOPHAGITIS: ICD-10-CM

## 2020-08-18 DIAGNOSIS — E11.65 TYPE 2 DIABETES MELLITUS WITH HYPERGLYCEMIA, WITHOUT LONG-TERM CURRENT USE OF INSULIN (HCC): Primary | ICD-10-CM

## 2020-08-18 PROCEDURE — 99213 OFFICE O/P EST LOW 20 MIN: CPT | Performed by: FAMILY MEDICINE

## 2020-08-18 RX ORDER — OMEPRAZOLE 40 MG/1
CAPSULE, DELAYED RELEASE ORAL
Qty: 30 CAPSULE | Refills: 0 | OUTPATIENT
Start: 2020-08-18

## 2020-08-18 NOTE — PROGRESS NOTES
Maria Del Rosario Suárez is a 44 y.o. male.     History of Present Illness     44-year-old male presents today for follow-up on type 2 diabetes mellitus.  I last saw the patient via telephone visit in May 2020.  At first he is not taking metformin correctly and A1c jumped.  Patient was seen by Dr. Broderick at the end of May.  Started on glipizide 10 mg XL once a day.  Advised to continue metformin 1000 mg twice daily.  He was referred to endocrinology.  Seen by endocrinology at the end of May.  Glipizide was increased to 10 mg twice daily.  Also added Farxiga and Januvia.  Advised to continue metformin.  Supposed to have appointment for CGM placement.    Blood pressure elevated at 152/104 in the office today.    Patient reports that 3 days ago he was at work and he was feeling jittery so he went to see a medical office; was advised blood sugar was 114.  Was advised to increase blood sugar drinking Gatorade.  Patient reports blood sugars at home running well over the past 2 and half months since he seen endocrinology.  Blood sugars are not running above 200.  He reports he has felt great without any symptoms of hypoglycemia or hyperglycemia.  Reluctant to make any changes in medications until speaking with endocrinologist.    The following portions of the patient's history were reviewed and updated as appropriate: allergies, current medications, past family history, past medical history, past social history, past surgical history and problem list.    Review of Systems   Constitutional: Negative for chills and fever.   Respiratory: Negative for cough and shortness of breath.    Cardiovascular: Negative for chest pain, palpitations and leg swelling.   Gastrointestinal: Negative for abdominal pain, nausea and vomiting.       Objective   Physical Exam   Constitutional: He is oriented to person, place, and time. He appears well-developed and well-nourished.   HENT:   Head: Normocephalic and atraumatic.   Eyes: Pupils are  equal, round, and reactive to light. EOM are normal.   Neck: Normal range of motion. Neck supple.   Cardiovascular: Normal rate, regular rhythm and normal heart sounds.   No murmur heard.  Pulmonary/Chest: Effort normal and breath sounds normal. No stridor. No respiratory distress. He has no wheezes. He has no rales.   Neurological: He is alert and oriented to person, place, and time.   Skin: Skin is warm.   Psychiatric: He has a normal mood and affect. His behavior is normal.               Assessment/Plan     Jamie was seen today for diabetes.    Diagnoses and all orders for this visit:    Type 2 diabetes mellitus with hyperglycemia, without long-term current use of insulin (CMS/[facility])    Essential hypertension    Advised patient that we can adjust glipizide however he would like to discuss with endocrinology first.  Advised to continue to keep blood sugar log continue to work on diet and exercise.  Also advised to keep blood pressure log and return to clinic in 4 weeks for follow-up on this.  Goal is to be less than 140/90.  If not we will consider adding medication like lisinopril better control.  Go to ER for new or worsening symptoms.

## 2020-08-19 ENCOUNTER — TELEPHONE (OUTPATIENT)
Dept: ENDOCRINOLOGY | Age: 45
End: 2020-08-19

## 2020-08-19 ENCOUNTER — TELEPHONE (OUTPATIENT)
Dept: FAMILY MEDICINE CLINIC | Facility: CLINIC | Age: 45
End: 2020-08-19

## 2020-08-19 NOTE — TELEPHONE ENCOUNTER
Patient came into office today stating that he got sent home from work on 8/15/20 due to feeling jittery and sweating bad he stated that he  Take his medication with food at work when they check his b/s it 114 not sure what to do he did say his PCP dr Dong was sending you a message though epic  Here is the medication he is currently on

## 2020-08-19 NOTE — TELEPHONE ENCOUNTER
Patient called to let us know that he has had a potential COVID 19 exposure as he was without PPE and in close proximity to someone that has just tested positive for it.  He has been having trouble with headaches lately he said but is unsure if that is related or not.  He is looking into getting a rapid test done this afternoon.

## 2020-08-20 NOTE — TELEPHONE ENCOUNTER
Unfortunately patient's body is used to such high blood sugars now normalizing the blood sugars he is feeling the symptoms of low blood sugars.  Ideally we would advise the patient to adapt to the symptoms but if he feels that his quality of life and work is being affected due to this would recommend decreasing the glipizide to half a tablet 2 times a day before meals.  His primary care did not touch base with me and I did not get any documentation from her

## 2020-08-21 ENCOUNTER — TELEPHONE (OUTPATIENT)
Dept: FAMILY MEDICINE CLINIC | Facility: CLINIC | Age: 45
End: 2020-08-21

## 2020-08-21 NOTE — TELEPHONE ENCOUNTER
Pt walked in asking if you have received an email/info from endocrinology re: meds.    [facility] also [facility]  I have not yet received forms - gave pt my fax #.

## 2020-08-23 NOTE — TELEPHONE ENCOUNTER
Yes, agreed with Endo recommendations.     Remind patient his BP was elevated at last visit as well; advise to check BP 3x a week and keep log and follow up with me with numbers in 4 weeks. If any CP SOB or HA with vision changes call us or go to ER.

## 2020-08-24 ENCOUNTER — DOCUMENTATION (OUTPATIENT)
Dept: ENDOCRINOLOGY | Age: 45
End: 2020-08-24

## 2020-08-24 NOTE — TELEPHONE ENCOUNTER
Pt walked in again - advised pt of your notes.  Pt seemed surprised about BP being elevated, but understands to keep BP log and f/u with you in 4 weeks.  Pt is asking when you want him to go back to work.  Work is supposed to be faxing forms.

## 2020-08-24 NOTE — PROGRESS NOTES
To whom so ever it may concern    Jamie Suárez, 44 y.o. is being treated by me for his diabetes mellitus.  Patient has reported to his symptoms of jitteriness and few other symptoms which he felt were secondary to his blood sugars being low. However a blood sugar of 120 is truly not a low blood sugar.  Patient has been reassured to keep up on his medications and to get his blood sugars down.  Patient from the diabetes perspective is okay to go back to work.  Patient has been educated that a blood sugar which is less than 80 is considered as a low blood sugar for him.  He needs to keep monitoring his blood sugars frequently along with taking his medications and eating the diabetic food which should prevent a low blood sugar episode anytime in the future.  Since patient walked into the office he is also being given education materials for continuous glucose monitoring and we will try to pursue 1 of these options, however his insurance would be a limiting factor.    Please call us if there are any further questions.    Thanks,

## 2020-08-25 NOTE — TELEPHONE ENCOUNTER
Pt walked in yesterday asking if we received [email or web address] Forms.  He had his work re-fax them.  Pt also asking if you will order BP Monitor for him from [employer].

## 2020-08-25 NOTE — TELEPHONE ENCOUNTER
I reviewed note from [place] and agree patient is ok to return to work starting today. Forms have been filled out and signed are in my outbox.

## 2020-08-28 ENCOUNTER — RESULTS ENCOUNTER (OUTPATIENT)
Dept: ENDOCRINOLOGY | Age: 45
End: 2020-08-28

## 2020-08-28 DIAGNOSIS — IMO0002 DM (DIABETES MELLITUS), TYPE 2, UNCONTROLLED W/NEUROLOGIC COMPLICATION: ICD-10-CM

## 2020-08-28 DIAGNOSIS — E11.65 TYPE 2 DIABETES MELLITUS WITH HYPERGLYCEMIA, WITHOUT LONG-TERM CURRENT USE OF INSULIN (HCC): ICD-10-CM

## 2020-08-28 DIAGNOSIS — E66.09 CLASS 2 OBESITY DUE TO EXCESS CALORIES WITHOUT SERIOUS COMORBIDITY WITH BODY MASS INDEX (BMI) OF 36.0 TO 36.9 IN ADULT: ICD-10-CM

## 2020-09-11 LAB
25(OH)D3+25(OH)D2 SERPL-MCNC: 13.8 NG/ML (ref 30–100)
ALBUMIN/CREAT UR: 1246 MG/G CREAT (ref 0–29)
CHOLEST SERPL-MCNC: 173 MG/DL (ref 100–199)
CREAT SERPL-MCNC: 1.1 MG/DL (ref 0.76–1.27)
CREAT UR-MCNC: 96 MG/DL
FOLATE SERPL-MCNC: 11.5 NG/ML
HBA1C MFR BLD: 5.6 % (ref 4.8–5.6)
HDLC SERPL-MCNC: 41 MG/DL
INTERPRETATION: NORMAL
LDLC SERPL CALC-MCNC: 114 MG/DL (ref 0–99)
Lab: NORMAL
MICROALBUMIN UR-MCNC: 1195.7 UG/ML
T4 FREE SERPL-MCNC: 1.33 NG/DL (ref 0.82–1.77)
TRIGL SERPL-MCNC: 99 MG/DL (ref 0–149)
TSH SERPL DL<=0.005 MIU/L-ACNC: 2.09 UIU/ML (ref 0.45–4.5)
VIT B12 SERPL-MCNC: 297 PG/ML (ref 232–1245)
VLDLC SERPL CALC-MCNC: 18 MG/DL (ref 5–40)

## 2020-09-15 DIAGNOSIS — E11.65 TYPE 2 DIABETES MELLITUS WITH HYPERGLYCEMIA, WITHOUT LONG-TERM CURRENT USE OF INSULIN (HCC): ICD-10-CM

## 2020-09-15 RX ORDER — GLIPIZIDE 10 MG/1
10 TABLET, FILM COATED, EXTENDED RELEASE ORAL
Qty: 60 TABLET | Refills: 2 | Status: SHIPPED | OUTPATIENT
Start: 2020-09-15 | End: 2020-09-18 | Stop reason: SDUPTHER

## 2020-09-18 ENCOUNTER — TELEPHONE (OUTPATIENT)
Dept: ENDOCRINOLOGY | Age: 45
End: 2020-09-18

## 2020-09-18 DIAGNOSIS — E11.65 TYPE 2 DIABETES MELLITUS WITH HYPERGLYCEMIA, WITHOUT LONG-TERM CURRENT USE OF INSULIN (HCC): ICD-10-CM

## 2020-09-18 DIAGNOSIS — E11.9 NEW ONSET TYPE 2 DIABETES MELLITUS (HCC): ICD-10-CM

## 2020-09-18 RX ORDER — GLIPIZIDE 10 MG/1
10 TABLET, FILM COATED, EXTENDED RELEASE ORAL
Qty: 180 TABLET | Refills: 2 | Status: SHIPPED | OUTPATIENT
Start: 2020-09-18 | End: 2021-05-17

## 2020-09-18 NOTE — TELEPHONE ENCOUNTER
Pt came into office and is needing refill of Glypizide and Metformin in 90 day supply. RX's must be sent to [facility] on [address] [facility] [place] [phone]

## 2020-09-27 DIAGNOSIS — K21.9 GASTROESOPHAGEAL REFLUX DISEASE WITHOUT ESOPHAGITIS: ICD-10-CM

## 2020-09-28 RX ORDER — OMEPRAZOLE 40 MG/1
CAPSULE, DELAYED RELEASE ORAL
Qty: 30 CAPSULE | Refills: 0 | Status: SHIPPED | OUTPATIENT
Start: 2020-09-28 | End: 2020-10-27

## 2020-09-28 RX ORDER — ALBUTEROL SULFATE 90 UG/1
2 AEROSOL, METERED RESPIRATORY (INHALATION) EVERY 4 HOURS PRN
Qty: 8 G | Refills: 5 | Status: SHIPPED | OUTPATIENT
Start: 2020-09-28 | End: 2021-04-09 | Stop reason: SDUPTHER

## 2020-10-20 ENCOUNTER — OFFICE VISIT (OUTPATIENT)
Dept: FAMILY MEDICINE CLINIC | Facility: CLINIC | Age: 45
End: 2020-10-20

## 2020-10-20 DIAGNOSIS — R05.9 COUGH: Primary | ICD-10-CM

## 2020-10-20 PROCEDURE — 99213 OFFICE O/P EST LOW 20 MIN: CPT | Performed by: FAMILY MEDICINE

## 2020-10-20 NOTE — PROGRESS NOTES
Mode of Visit: Video  Jamie Suárez confirmed that he was in [place] at the time of this video visit.   The visit included video face to face interaction. No technical issues occurred during this visit.   Patient has given verbal consent for this video visit.  He understands the limitations with limited options for physical exam and wishes to proceed with this visit.      Subjective       Chief Complaint   Patient presents with   • Cough     x a few days   • Shortness of Breath   • Sore Throat         HPI:        Jamie Suárez is a 45 y.o. male who presents to  [address] [facility] today with symptoms of dry cough, wheezing and shortness of breath and sore throat that began 48 hours ago.  Symptoms are progressing.  Symptoms are mild to moderate at this time.  He states he was tested 1 week ago for Covid with no symptoms present and was told he was negative.  These are totally new symptoms within the past 48 hours.    I have reviewed and updated his medications, medical history and problem list during today's office visit.     Social History     Tobacco Use   • Smoking status: Never Smoker   • Smokeless tobacco: Never Used   Substance Use Topics   • Alcohol use: No     Frequency: Never       Review of Systems   Constitutional: Negative for fever.   HENT: Positive for sore throat.    Respiratory: Positive for cough, shortness of breath and wheezing.          PE:   Objective   There were no vitals taken for this visit.    There is no height or weight on file to calculate BMI.    Physical Exam   Constitutional: He appears well-developed. No distress.   Clear his throat once or twice during video visit   Eyes: Conjunctivae are normal. No scleral icterus.   Neck: Neck normal appearance.  Pulmonary/Chest: Effort normal.  No respiratory distress. He no audible wheeze...  Neurological: He is alert.   Skin: Skin is dry.   Psychiatric: He has a normal mood and affect.                              A/P:     Assessment/Plan   Diagnoses and all orders for this visit:    1. Cough (Primary)  Comments:  Cough, shortness of breath, sore throat symptoms within 48 hours.  Advised to go to ER for COVID-19 rule out          Follow up:    Return if symptoms worsen or fail to improve.

## 2020-10-23 ENCOUNTER — TELEPHONE (OUTPATIENT)
Dept: FAMILY MEDICINE CLINIC | Facility: CLINIC | Age: 45
End: 2020-10-23

## 2020-10-23 NOTE — TELEPHONE ENCOUNTER
Pt called requesting a breathing machine with treatments, for his asthma.  He stated he went to Hospital due to SOB and cough he states he tested negative for covid. Please advise.  Thanks

## 2020-10-26 DIAGNOSIS — K21.9 GASTROESOPHAGEAL REFLUX DISEASE WITHOUT ESOPHAGITIS: ICD-10-CM

## 2020-10-27 ENCOUNTER — OFFICE VISIT (OUTPATIENT)
Dept: FAMILY MEDICINE CLINIC | Facility: CLINIC | Age: 45
End: 2020-10-27

## 2020-10-27 VITALS
TEMPERATURE: 97.1 F | BODY MASS INDEX: 37.28 KG/M2 | DIASTOLIC BLOOD PRESSURE: 97 MMHG | RESPIRATION RATE: 20 BRPM | HEIGHT: 66 IN | SYSTOLIC BLOOD PRESSURE: 157 MMHG | HEART RATE: 90 BPM | WEIGHT: 232 LBS

## 2020-10-27 DIAGNOSIS — J45.40 MODERATE PERSISTENT ASTHMA, UNSPECIFIED WHETHER COMPLICATED: Primary | ICD-10-CM

## 2020-10-27 PROCEDURE — 99213 OFFICE O/P EST LOW 20 MIN: CPT | Performed by: FAMILY MEDICINE

## 2020-10-27 RX ORDER — DEXAMETHASONE 4 MG/1
1 TABLET ORAL
Qty: 1 INHALER | Refills: 5 | OUTPATIENT
Start: 2020-10-27 | End: 2021-11-14

## 2020-10-27 RX ORDER — OMEPRAZOLE 40 MG/1
CAPSULE, DELAYED RELEASE ORAL
Qty: 30 CAPSULE | Refills: 0 | Status: SHIPPED | OUTPATIENT
Start: 2020-10-27 | End: 2020-12-15

## 2020-10-27 NOTE — PROGRESS NOTES
Maria Del Rosario Suárez is a 45 y.o. male.     History of Present Illness     45-year-old male presents today for hospital follow-up.  Was seen in the ER September 25, 2020. Per records, mild chest pain shortness of breath.  Covid test negative.  Improved after GI cocktail.  Diagnosed with gastroesophageal reflux.  About a week ago had a video visit with another provider in our office for cough shortness of breath and sore throat.  Patient was advised to go to the ER for COVID-19 rule out; reports tested last week and was negative.    Per records diagnosis of mild persistent asthma.  Finish steroid pack yesterday.  Now using albuterol inhaler maybe only once a day.  No waking up short of breath or wheezing.  Mainly during the day without shortness of breath or wheezing.    The following portions of the patient's history were reviewed and updated as appropriate: allergies, current medications, past family history, past medical history, past social history, past surgical history and problem list.    Review of Systems   Constitutional: Negative for chills and fever.   Respiratory: Negative for cough and shortness of breath.    Cardiovascular: Negative for chest pain, palpitations and leg swelling.   Gastrointestinal: Negative for abdominal pain, nausea and vomiting.       Objective   Physical Exam  Constitutional:       Appearance: He is well-developed.   HENT:      Head: Normocephalic and atraumatic.   Eyes:      Pupils: Pupils are equal, round, and reactive to light.   Neck:      Musculoskeletal: Normal range of motion and neck supple.   Cardiovascular:      Rate and Rhythm: Normal rate and regular rhythm.      Heart sounds: Normal heart sounds. No murmur.   Pulmonary:      Effort: Pulmonary effort is normal. No respiratory distress.      Breath sounds: Normal breath sounds. No stridor. No wheezing or rales.   Skin:     General: Skin is warm.   Neurological:      Mental Status: He is alert and oriented to person,  place, and time.   Psychiatric:         Behavior: Behavior normal.                 Assessment/Plan     Diagnoses and all orders for this visit:    1. Moderate persistent asthma, unspecified whether complicated (Primary)  -     fluticasone (Flovent HFA) 110 MCG/ACT inhaler; Inhale 1 puff 2 (Two) Times a Day.  Dispense: 1 inhaler; Refill: 5  -     Ambulatory Referral to Pulmonology    Start Flovent and refer to pulmonary for PFTs as he has not had these done in several years.  Concerned asthma is worsening.  Follow-up with me in 4 weeks or sooner if needed.  Continue use albuterol inhaler as needed for shortness of breath or wheezing.  Go to ER for new or worsening symptoms.  Patient expressed understanding agreeable to plan.

## 2020-12-01 ENCOUNTER — TELEMEDICINE (OUTPATIENT)
Dept: FAMILY MEDICINE CLINIC | Facility: CLINIC | Age: 45
End: 2020-12-01

## 2020-12-01 DIAGNOSIS — J01.00 ACUTE NON-RECURRENT MAXILLARY SINUSITIS: Primary | ICD-10-CM

## 2020-12-01 PROCEDURE — 99213 OFFICE O/P EST LOW 20 MIN: CPT | Performed by: FAMILY MEDICINE

## 2020-12-01 RX ORDER — AMOXICILLIN AND CLAVULANATE POTASSIUM 875; 125 MG/1; MG/1
1 TABLET, FILM COATED ORAL 2 TIMES DAILY
Qty: 14 TABLET | Refills: 0 | Status: SHIPPED | OUTPATIENT
Start: 2020-12-01 | End: 2020-12-08

## 2020-12-01 NOTE — PROGRESS NOTES
Subjective   Jamie Suárez is a 45 y.o. male.     History of Present Illness     Jamie Suárez 45 y.o. male who presents today via video visit for evaluation of sinus pressure and congestion, cough. Symptoms include ear pressure, congestion, productive cough, headache and sinus pain.  Onset of symptoms was a few days ago, unchanged since that time. Patient denies shortness of breath, wheezing, fever, diarrhea, chills, sweats.   Evaluation to date: was seen at an urgent care center Treatment to date:  Bromfed.       Advised to take Flovent regularly as well; patient will pursue the rescue inhaler however it is meant to be a controller medication.      The following portions of the patient's history were reviewed and updated as appropriate: allergies, current medications, past family history, past medical history, past social history, past surgical history and problem list.    Review of Systems   Constitutional: Negative for chills and fever.   HENT: Positive for congestion and sinus pressure.    Respiratory: Positive for cough. Negative for shortness of breath and wheezing.    Cardiovascular: Negative for chest pain, palpitations and leg swelling.   Gastrointestinal: Negative for abdominal pain, nausea and vomiting.       Objective   Physical Exam  Constitutional:       Appearance: He is well-developed.   HENT:      Head: Normocephalic and atraumatic.      Right Ear: External ear normal.      Left Ear: External ear normal.   Pulmonary:      Effort: Pulmonary effort is normal. No respiratory distress.   Neurological:      Mental Status: He is alert and oriented to person, place, and time.   Psychiatric:         Behavior: Behavior normal.           No results found for: COLORU, CLARITYU, SPECGRAV, PHUR, LEUKOCYTESUR, NITRITE, PROTEINPOCUA, GLUCOSEUR, KETONESU, UROBILINOGEN, BILIRUBINUR, RBCUR      Assessment/Plan     Diagnoses and all orders for this visit:    1. Acute non-recurrent maxillary sinusitis (Primary)  -      amoxicillin-clavulanate (Augmentin) 875-125 MG per tablet; Take 1 tablet by mouth 2 (Two) Times a Day for 7 days.  Dispense: 14 tablet; Refill: 0      If COVID-19 test comes back positive we will consider dexamethasone and azithromycin.  For now treat for sinusitis as noted above.  Continue Bromfed as needed.  Take Flovent regularly.  Continue albuterol as needed.    Time spent during visit: 15 minutes.

## 2020-12-02 ENCOUNTER — TELEPHONE (OUTPATIENT)
Dept: URGENT CARE | Facility: CLINIC | Age: 45
End: 2020-12-02

## 2020-12-03 ENCOUNTER — TELEPHONE (OUTPATIENT)
Dept: FAMILY MEDICINE CLINIC | Facility: CLINIC | Age: 45
End: 2020-12-03

## 2020-12-03 NOTE — TELEPHONE ENCOUNTER
Because per urgent care records and patient report, he has no known Covid exposure and COVID-19 test was negative, okay to return to work on Monday.  Please place letter in chart to this effect.  Thanks

## 2020-12-03 NOTE — TELEPHONE ENCOUNTER
I tried to call pt no answer and could not leave message.  Letter has been completed and copy taken to front office for .  Thanks

## 2020-12-08 ENCOUNTER — TELEPHONE (OUTPATIENT)
Dept: FAMILY MEDICINE CLINIC | Facility: CLINIC | Age: 45
End: 2020-12-08

## 2020-12-08 DIAGNOSIS — J06.9 UPPER RESPIRATORY TRACT INFECTION, UNSPECIFIED TYPE: ICD-10-CM

## 2020-12-08 RX ORDER — BROMPHENIRAMINE MALEATE, PSEUDOEPHEDRINE HYDROCHLORIDE, AND DEXTROMETHORPHAN HYDROBROMIDE 2; 30; 10 MG/5ML; MG/5ML; MG/5ML
5 SYRUP ORAL 4 TIMES DAILY PRN
Qty: 118 ML | Refills: 0 | Status: SHIPPED | OUTPATIENT
Start: 2020-12-08 | End: 2021-04-09 | Stop reason: SDUPTHER

## 2020-12-08 NOTE — TELEPHONE ENCOUNTER
Pt called requesting medication for his cough.  He stated that what he has received from urgent care, he is now out.  Pt states that he still has cough.  Thanks

## 2020-12-08 NOTE — TELEPHONE ENCOUNTER
I refilled it for him; if he has new or worsening symptoms or if cough does not resolve, let me know.

## 2020-12-11 ENCOUNTER — OFFICE VISIT (OUTPATIENT)
Dept: FAMILY MEDICINE CLINIC | Facility: CLINIC | Age: 45
End: 2020-12-11

## 2020-12-11 VITALS
HEIGHT: 65 IN | BODY MASS INDEX: 41.82 KG/M2 | RESPIRATION RATE: 16 BRPM | TEMPERATURE: 97.1 F | OXYGEN SATURATION: 97 % | WEIGHT: 251 LBS | DIASTOLIC BLOOD PRESSURE: 87 MMHG | HEART RATE: 87 BPM | SYSTOLIC BLOOD PRESSURE: 135 MMHG

## 2020-12-11 DIAGNOSIS — S43.422A SPRAIN OF LEFT ROTATOR CUFF CAPSULE, INITIAL ENCOUNTER: Primary | ICD-10-CM

## 2020-12-11 DIAGNOSIS — M79.602 LEFT ARM PAIN: ICD-10-CM

## 2020-12-11 PROCEDURE — 99213 OFFICE O/P EST LOW 20 MIN: CPT | Performed by: FAMILY MEDICINE

## 2020-12-11 PROCEDURE — 73030 X-RAY EXAM OF SHOULDER: CPT | Performed by: FAMILY MEDICINE

## 2020-12-11 PROCEDURE — 73060 X-RAY EXAM OF HUMERUS: CPT | Performed by: FAMILY MEDICINE

## 2020-12-11 NOTE — PROGRESS NOTES
Maria Del Rosario Suárez is a 45 y.o. male.     History of Present Illness     Patient complains of left shoulder pain. The symptoms began a week ago; however had similar episode 3 years ago.  Pain is a result of no known event. Pain is located L upper arm and elbow and forearm region. Discomfort is described as aching, soreness and throbbing. Symptoms are exacerbated by overhead movements, reaching, arm movement, repetative movement , bending joint, extending joint, weight bearing, pressure applied to area and movement.  Evaluation to date: Chiropractor. Therapy to date includes: accupuncture      The following portions of the patient's history were reviewed and updated as appropriate: allergies, current medications, past family history, past medical history, past social history, past surgical history and problem list.    Review of Systems   Constitutional: Negative for chills and fever.   Respiratory: Negative for cough and shortness of breath.    Cardiovascular: Negative for chest pain, palpitations and leg swelling.   Gastrointestinal: Negative for abdominal pain, nausea and vomiting.   Musculoskeletal: Positive for arthralgias and myalgias. Negative for back pain, gait problem, joint swelling, neck pain, neck stiffness and bursitis.       Objective   Physical Exam  Constitutional:       Appearance: He is well-developed.   HENT:      Head: Normocephalic and atraumatic.   Eyes:      Pupils: Pupils are equal, round, and reactive to light.   Neck:      Musculoskeletal: Normal range of motion and neck supple.   Cardiovascular:      Rate and Rhythm: Normal rate and regular rhythm.      Heart sounds: Normal heart sounds. No murmur.   Pulmonary:      Effort: Pulmonary effort is normal. No respiratory distress.      Breath sounds: Normal breath sounds. No stridor. No wheezing or rales.   Musculoskeletal:      Left shoulder: He exhibits decreased range of motion and tenderness.      Left elbow: He exhibits no swelling  and no effusion. Tenderness found. Radial head tenderness noted.      Comments: + empty can test L side  Pain with internal/external rotation against resistance L side   Pain with extension at shoulder joint against resistance L side   Skin:     General: Skin is warm.   Neurological:      Mental Status: He is alert and oriented to person, place, and time.   Psychiatric:         Behavior: Behavior normal.           No results found for: COLORU, CLARITYU, SPECGRAV, PHUR, LEUKOCYTESUR, NITRITE, PROTEINPOCUA, GLUCOSEUR, KETONESU, UROBILINOGEN, BILIRUBINUR, RBCUR      Assessment/Plan     Diagnoses and all orders for this visit:    1. Sprain of left rotator cuff capsule, initial encounter (Primary)  -     Ambulatory Referral to Physical Therapy Evaluate and treat  -     XR Shoulder 2+ View Left (In Office)  -     XR Humerus Left (In Office)    2. Left arm pain  -     Ambulatory Referral to Physical Therapy Evaluate and treat  -     XR Shoulder 2+ View Left (In Office)  -     XR Humerus Left (In Office)        Patient describes an episode of what sounds like cervical radiculopathy 3 years ago; pain today is different because there is no pain at the back base of neck or between neck and shoulder.  Exam suggests rotator cuff sprain or strain.  No swelling or erythema.  Follow-up x-rays and further PT.  Prescribe transdermal cream from compound pharmacy which contains diclofenac bupivacaine gabapentin ibuprofen cyclobenzaprine.  Return to clinic in 6 weeks for follow-up or sooner if needed.

## 2020-12-14 NOTE — PROGRESS NOTES
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  Continue with plan of care as discussed at last visit with physical therapy.  Go to ER for new or worsening symptoms.

## 2020-12-15 DIAGNOSIS — K21.9 GASTROESOPHAGEAL REFLUX DISEASE WITHOUT ESOPHAGITIS: ICD-10-CM

## 2020-12-15 RX ORDER — OMEPRAZOLE 40 MG/1
CAPSULE, DELAYED RELEASE ORAL
Qty: 30 CAPSULE | Refills: 0 | Status: SHIPPED | OUTPATIENT
Start: 2020-12-15 | End: 2021-01-11

## 2021-01-11 DIAGNOSIS — K21.9 GASTROESOPHAGEAL REFLUX DISEASE WITHOUT ESOPHAGITIS: ICD-10-CM

## 2021-01-11 RX ORDER — OMEPRAZOLE 40 MG/1
CAPSULE, DELAYED RELEASE ORAL
Qty: 30 CAPSULE | Refills: 0 | Status: SHIPPED | OUTPATIENT
Start: 2021-01-11 | End: 2021-02-17

## 2021-02-17 DIAGNOSIS — K21.9 GASTROESOPHAGEAL REFLUX DISEASE WITHOUT ESOPHAGITIS: ICD-10-CM

## 2021-02-17 RX ORDER — OMEPRAZOLE 40 MG/1
CAPSULE, DELAYED RELEASE ORAL
Qty: 15 CAPSULE | Refills: 0 | Status: SHIPPED | OUTPATIENT
Start: 2021-02-17 | End: 2021-03-01 | Stop reason: SDUPTHER

## 2021-03-01 ENCOUNTER — OFFICE VISIT (OUTPATIENT)
Dept: FAMILY MEDICINE CLINIC | Facility: CLINIC | Age: 46
End: 2021-03-01

## 2021-03-01 VITALS
OXYGEN SATURATION: 97 % | DIASTOLIC BLOOD PRESSURE: 101 MMHG | BODY MASS INDEX: 42.82 KG/M2 | TEMPERATURE: 96.4 F | RESPIRATION RATE: 16 BRPM | HEIGHT: 65 IN | HEART RATE: 91 BPM | WEIGHT: 257 LBS | SYSTOLIC BLOOD PRESSURE: 153 MMHG

## 2021-03-01 DIAGNOSIS — R03.0 ELEVATED BLOOD PRESSURE READING: ICD-10-CM

## 2021-03-01 DIAGNOSIS — Z12.11 COLON CANCER SCREENING: ICD-10-CM

## 2021-03-01 DIAGNOSIS — E55.9 VITAMIN D DEFICIENCY: ICD-10-CM

## 2021-03-01 DIAGNOSIS — K21.9 GASTROESOPHAGEAL REFLUX DISEASE WITHOUT ESOPHAGITIS: Primary | ICD-10-CM

## 2021-03-01 PROCEDURE — 99214 OFFICE O/P EST MOD 30 MIN: CPT | Performed by: FAMILY MEDICINE

## 2021-03-01 RX ORDER — MELATONIN
1000 DAILY
Qty: 30 TABLET | Refills: 2 | OUTPATIENT
Start: 2021-03-01 | End: 2021-11-14

## 2021-03-01 RX ORDER — OMEPRAZOLE 40 MG/1
40 CAPSULE, DELAYED RELEASE ORAL DAILY
Qty: 90 CAPSULE | Refills: 3 | Status: SHIPPED | OUTPATIENT
Start: 2021-03-01 | End: 2022-02-21

## 2021-03-01 NOTE — PROGRESS NOTES
Subjective   Jamie Suárez is a 45 y.o. male.     History of Present Illness     45-year-old male presents today for follow-up on GERD.    Omeprazole 40 mg a day continues to control symptoms well.  Needs refill.    Blood pressure elevated in the office today at 153/101.  Denies chest pain shortness of breath headache vision changes.      Type 2 diabetes mellitus: Metformin Januvia glipizide Farxiga.  Follow-up with endocrinology.    Severe obstructive sleep apnea: AHI 46.7 on PSG March 2019. Has been using it. Will schedule follow up with me in sleep lab.     Colon cancer screening: Needs this    Vitamin D deficiency: September 2020 low vitamin D 13.8. Not taking supplements. Needs this.     PHQ-2/PHQ-9 Depression Screening 1/28/2020   Little interest or pleasure in doing things 0   Feeling down, depressed, or hopeless 0   Total Score 0       The following portions of the patient's history were reviewed and updated as appropriate: allergies, current medications, past family history, past medical history, past social history, past surgical history and problem list.    Review of Systems   Constitutional: Negative for chills and fever.   Respiratory: Negative for cough and shortness of breath.    Cardiovascular: Negative for chest pain, palpitations and leg swelling.   Gastrointestinal: Negative for abdominal pain, nausea and vomiting.       Objective   Physical Exam  Constitutional:       Appearance: He is well-developed.   HENT:      Head: Normocephalic and atraumatic.   Eyes:      Pupils: Pupils are equal, round, and reactive to light.   Neck:      Musculoskeletal: Normal range of motion and neck supple.   Cardiovascular:      Rate and Rhythm: Normal rate and regular rhythm.      Heart sounds: Normal heart sounds. No murmur.   Pulmonary:      Effort: Pulmonary effort is normal. No respiratory distress.      Breath sounds: Normal breath sounds. No stridor. No wheezing or rales.   Skin:     General: Skin is warm.    Neurological:      Mental Status: He is alert and oriented to person, place, and time.   Psychiatric:         Behavior: Behavior normal.       Assessment/Plan     Diagnoses and all orders for this visit:    1. Gastroesophageal reflux disease without esophagitis (Primary)  -     omeprazole (priLOSEC) 40 MG capsule; Take 1 capsule by mouth Daily.  Dispense: 90 capsule; Refill: 3    2. Colon cancer screening  -     Ambulatory Referral For Screening Colonoscopy    3. Vitamin D deficiency  -     cholecalciferol (Vitamin D, Cholecalciferol,) 25 MCG (1000 UT) tablet; Take 1 tablet by mouth Daily.  Dispense: 30 tablet; Refill: 2  -     Vitamin D 25 Hydroxy; Future    4. Elevated blood pressure reading      Keep blood pressure log and email me with blood pressure log in 4 weeks.  Follow-up with me in sleep lab.

## 2021-03-01 NOTE — PATIENT INSTRUCTIONS
How to Take Your Blood Pressure  You can take your blood pressure at home with a machine. You may need to check your blood pressure at home:  · To check if you have high blood pressure (hypertension).  · To check your blood pressure over time.  · To make sure your blood pressure medicine is working.  Supplies needed:  You will need a blood pressure machine, or monitor. You can buy one at a [employer] or online. When choosing one:  · Choose one with an arm cuff.  · Choose one that wraps around your upper arm. Only one finger should fit between your arm and the cuff.  · Do not choose one that measures your blood pressure from your wrist or finger.  Your doctor can suggest a monitor.  How to prepare  Avoid these things for 30 minutes before checking your blood pressure:  · Drinking caffeine.  · Drinking alcohol.  · Eating.  · Smoking.  · Exercising.  Five minutes before checking your blood pressure:  · Pee.  · Sit in a dining chair. Avoid sitting in a soft couch or armchair.  · Be quiet. Do not talk.  How to take your blood pressure  Follow the instructions that came with your machine. If you have a digital blood pressure monitor, these may be the instructions:  1. Sit up straight.  2. Place your feet on the floor. Do not cross your ankles or legs.  3. Rest your left arm at the level of your heart. You may rest it on a table, desk, or chair.  4. Pull up your shirt sleeve.  5. Wrap the blood pressure cuff around the upper part of your left arm. The cuff should be 1 inch (2.5 cm) above your elbow. It is best to wrap the cuff around bare skin.  6. Fit the cuff snugly around your arm. You should be able to place only one finger between the cuff and your arm.  7. Put the cord inside the groove of your elbow.  8. Press the power button.  9. Sit quietly while the cuff fills with air and loses air.  10. Write down the numbers on the screen.  11. Wait 2-3 minutes and then repeat steps 1-10.  What do the numbers mean?  Two  "numbers make up your blood pressure. The first number is called systolic pressure. The second is called diastolic pressure. An example of a blood pressure reading is \"120 over 80\" (or 120/80).  If you are an adult and do not have a medical condition, use this guide to find out if your blood pressure is normal:  Normal  · First number: below 120.  · Second number: below 80.  Elevated  · First number: 120-129.  · Second number: below 80.  Hypertension stage 1  · First number: 130-139.  · Second number: 80-89.  Hypertension stage 2  · First number: 140 or above.  · Second number: 90 or above.  Your blood pressure is above normal even if only the top or bottom number is above normal.  Follow these instructions at home:  · Check your blood pressure as often as your doctor tells you to.  · Take your monitor to your next doctor's appointment. Your doctor will:  ? Make sure you are using it correctly.  ? Make sure it is working right.  · Make sure you understand what your blood pressure numbers should be.  · Tell your doctor if your medicines are causing side effects.  Contact a doctor if:  · Your blood pressure keeps being high.  Get help right away if:  · Your first blood pressure number is higher than 180.  · Your second blood pressure number is higher than 120.  This information is not intended to replace advice given to you by your health care provider. Make sure you discuss any questions you have with your health care provider.  Document Revised: 11/30/2018 Document Reviewed: 05/26/2017  Elsevier Patient Education © 2020 [employer]    "

## 2021-03-03 ENCOUNTER — TELEPHONE (OUTPATIENT)
Dept: GASTROENTEROLOGY | Facility: CLINIC | Age: 46
End: 2021-03-03

## 2021-03-15 DIAGNOSIS — K21.9 GASTROESOPHAGEAL REFLUX DISEASE WITHOUT ESOPHAGITIS: ICD-10-CM

## 2021-03-17 RX ORDER — OMEPRAZOLE 40 MG/1
CAPSULE, DELAYED RELEASE ORAL
Qty: 15 CAPSULE | OUTPATIENT
Start: 2021-03-17

## 2021-04-02 ENCOUNTER — BULK ORDERING (OUTPATIENT)
Dept: CASE MANAGEMENT | Facility: OTHER | Age: 46
End: 2021-04-02

## 2021-04-02 DIAGNOSIS — Z23 IMMUNIZATION DUE: ICD-10-CM

## 2021-04-06 ENCOUNTER — TELEPHONE (OUTPATIENT)
Dept: URGENT CARE | Facility: CLINIC | Age: 46
End: 2021-04-06

## 2021-04-06 ENCOUNTER — TELEPHONE (OUTPATIENT)
Dept: FAMILY MEDICINE CLINIC | Facility: CLINIC | Age: 46
End: 2021-04-06

## 2021-04-06 NOTE — TELEPHONE ENCOUNTER
I called patient suggest that he drink a little bit of orange juice and call me in about 30 min to an hour.

## 2021-04-06 NOTE — TELEPHONE ENCOUNTER
PATIENT STATES HE JUST WOKE UP AND IS VERY NAUSEOUS AND SHAKY LIKE HAS THE JITTERS REALLY BAD. HE CHECKED HIS BLOOD SUGAR AND ITS GOOD . SO HE DOESN'T KNOW WHAT IS GOING ON. THERE ARE NO APTS WITH ANY UNTIL 04/09 WITH [facility]. HE CAN NOT WAIT THAT LONG AND HE DOESN'T REALLY WANT TO GO TO [facility] BECAUSE HE BELIEVES THEY WILL JUST SWAB HIM FOR COVID AND PUT HIM OFF WORK... HE JUST FEELS OFF. CAN DR. CONNELL GIVE HIM A CALL OR SUGGEST SOMETHING TO HELP???    PATIENT CAN BE REACHED AT: [phone]

## 2021-04-06 NOTE — TELEPHONE ENCOUNTER
I agree with orange juice and recheck 30 minutes later.  If no improvement then he needs to go to urgent care or ER.

## 2021-04-07 NOTE — TELEPHONE ENCOUNTER
XR Chest 2 View  Order: [id number]  Status:  Final result   Visible to patient:  No (scheduled for 4/6/2021  4:31 PM) Next appt:  09/02/2021 at 09:30 AM in Family Medicine (Ed Dong MD)  Details    Reading Physician Reading Date Result Priority   Brennen Khan MD  [phone] 4/6/2021       Narrative & Impression  XR CHEST 2 VW-     HISTORY: Male who is 45 years-old,  cough     TECHNIQUE: Frontal and lateral views of the chest     COMPARISON: 03/02/2018     FINDINGS: Heart, mediastinum and pulmonary vasculature are unremarkable.  No focal pulmonary consolidation, pleural effusion, or pneumothorax. No  acute osseous process.     IMPRESSION:  No evidence for acute pulmonary process. Follow-up as  clinical indications persist.     This report was finalized on 4/6/2021 4:28 PM by Dr. Brennen Khan M.D.        Spoke with patient. No pneumonia seen on CXR. But recommend continue antibiotics and use of inhaler every 4-6 hours and f/u via MYCHART with PCP

## 2021-04-09 ENCOUNTER — OFFICE VISIT (OUTPATIENT)
Dept: FAMILY MEDICINE CLINIC | Facility: CLINIC | Age: 46
End: 2021-04-09

## 2021-04-09 VITALS
HEART RATE: 110 BPM | DIASTOLIC BLOOD PRESSURE: 99 MMHG | TEMPERATURE: 97.1 F | BODY MASS INDEX: 40.18 KG/M2 | RESPIRATION RATE: 16 BRPM | WEIGHT: 250 LBS | SYSTOLIC BLOOD PRESSURE: 148 MMHG | HEIGHT: 66 IN | OXYGEN SATURATION: 95 %

## 2021-04-09 DIAGNOSIS — J45.41 MODERATE PERSISTENT ASTHMA WITH ACUTE EXACERBATION: Primary | ICD-10-CM
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
## 2021-04-09 DIAGNOSIS — J06.9 UPPER RESPIRATORY TRACT INFECTION, UNSPECIFIED TYPE: ICD-10-CM

## 2021-04-09 DIAGNOSIS — S60.469D: ICD-10-CM

## 2021-04-09 PROCEDURE — 99214 OFFICE O/P EST MOD 30 MIN: CPT | Performed by: FAMILY MEDICINE

## 2021-04-09 RX ORDER — ALBUTEROL SULFATE 1.25 MG/3ML
2.5 SOLUTION RESPIRATORY (INHALATION) ONCE
Status: DISCONTINUED | OUTPATIENT
Start: 2021-04-09 | End: 2021-12-13

## 2021-04-09 RX ORDER — ALBUTEROL SULFATE 90 UG/1
2 AEROSOL, METERED RESPIRATORY (INHALATION) EVERY 4 HOURS PRN
Qty: 8 G | Refills: 5 | Status: SHIPPED | OUTPATIENT
Start: 2021-04-09 | End: 2022-11-04

## 2021-04-09 RX ORDER — PREDNISONE 20 MG/1
TABLET ORAL
Qty: 18 TABLET | Refills: 0 | Status: SHIPPED | OUTPATIENT
Start: 2021-04-09 | End: 2021-11-04

## 2021-04-09 RX ORDER — BROMPHENIRAMINE MALEATE, PSEUDOEPHEDRINE HYDROCHLORIDE, AND DEXTROMETHORPHAN HYDROBROMIDE 2; 30; 10 MG/5ML; MG/5ML; MG/5ML
5 SYRUP ORAL 4 TIMES DAILY PRN
Qty: 118 ML | Refills: 0 | Status: SHIPPED | OUTPATIENT
Start: 2021-04-09 | End: 2021-10-19 | Stop reason: SDUPTHER

## 2021-04-09 NOTE — PROGRESS NOTES
Maria Del Rosario Suárez is a 45 y.o. male.     History of Present Illness     45-year-old male presents today for follow-up from urgent care visit 3 days ago when there was concern for pneumonia.  Chest x-ray and likely negative for pneumonia; he was advised to complete his course of doxycycline.  Today patient reports no change in cough and wheezing.    No fevers chills or night sweats.  Cough slightly productive.  Needs albuterol inhaler refill.  Amenable to albuterol nebulizer treatment in the office.    Blood bite right hand ring finger and pinky finger.  Soft tender to palpation no drainage no erythema slight induration.        The following portions of the patient's history were reviewed and updated as appropriate: allergies, current medications, past family history, past medical history, past social history, past surgical history and problem list.    Review of Systems   Constitutional: Negative for chills and fever.   Respiratory: Positive for cough and wheezing. Negative for shortness of breath.    Cardiovascular: Negative for chest pain, palpitations and leg swelling.   Gastrointestinal: Negative for abdominal pain, nausea and vomiting.   Skin: Positive for skin lesions.       Objective   Physical Exam  Constitutional:       Appearance: He is well-developed.   HENT:      Head: Normocephalic and atraumatic.   Eyes:      Pupils: Pupils are equal, round, and reactive to light.   Cardiovascular:      Rate and Rhythm: Normal rate and regular rhythm.      Heart sounds: Normal heart sounds. No murmur heard.     Pulmonary:      Effort: Pulmonary effort is normal. No respiratory distress.      Breath sounds: No stridor. Examination of the right-middle field reveals wheezing. Examination of the left-middle field reveals wheezing. Examination of the right-lower field reveals wheezing. Examination of the left-lower field reveals wheezing. Wheezing (exp) present. No decreased breath sounds or rales.    Musculoskeletal:      Cervical back: Normal range of motion and neck supple.   Skin:     General: Skin is warm.   Neurological:      Mental Status: He is alert and oriented to person, place, and time.   Psychiatric:         Behavior: Behavior normal.                 Assessment/Plan     Diagnoses and all orders for this visit:    1. Moderate persistent asthma with acute exacerbation (Primary)  -     albuterol (PROVENTIL) nebulizer solution 0.042% 1.25 mg/3mL  -     predniSONE (DELTASONE) 20 MG tablet; Take 3 tablets daily for 4 days then take 2 tablets daily for 3 days.  Dispense: 18 tablet; Refill: 0  -     albuterol sulfate  (90 Base) MCG/ACT inhaler; Inhale 2 puffs Every 4 (Four) Hours As Needed for Wheezing.  Dispense: 8 g; Refill: 5  -     brompheniramine-pseudoephedrine-DM 30-2-10 MG/5ML syrup; Take 5 mL by mouth 4 (Four) Times a Day As Needed for Congestion or Cough.  Dispense: 118 mL; Refill: 0    2. Bug bite of finger, subsequent encounter    3. Upper respiratory tract infection, unspecified type  -     brompheniramine-pseudoephedrine-DM 30-2-10 MG/5ML syrup; Take 5 mL by mouth 4 (Four) Times a Day As Needed for Congestion or Cough.  Dispense: 118 mL; Refill: 0    Significant improvement in wheezing after albuterol nebulizer treatment.    Complete course of doxycycline, start steroids as noted above.  Albuterol inhaler every 4-6 hours x2 days then as needed every 4-6 hours for wheezing/shortness of breath.  Go to ER for new or worsening symptoms.    Apply ice to bug bite.  Continue course of doxycycline.  If no improvement or if symptoms worsen return to clinic sooner.

## 2021-04-13 DIAGNOSIS — K21.9 GASTROESOPHAGEAL REFLUX DISEASE WITHOUT ESOPHAGITIS: ICD-10-CM

## 2021-04-13 RX ORDER — OMEPRAZOLE 40 MG/1
CAPSULE, DELAYED RELEASE ORAL
Qty: 15 CAPSULE | OUTPATIENT
Start: 2021-04-13

## 2021-04-14 ENCOUNTER — TELEPHONE (OUTPATIENT)
Dept: FAMILY MEDICINE CLINIC | Facility: CLINIC | Age: 46
End: 2021-04-14

## 2021-04-14 NOTE — TELEPHONE ENCOUNTER
Caller: Jamie Suárez    Relationship to patient: Self    Best call back number: [phone]    Patient is needing: PATIENT WANTED TO LET DR. KO ROMEROAT THE ANTIBIOTICS AND STEROID HE WAS GIVEN FOR PNEUMONIA HAS NOT WORKED. HE SAID THAT HE MAY HAVE TO GO TO THE EMERGENCY ROOM.

## 2021-04-21 ENCOUNTER — TELEPHONE (OUTPATIENT)
Dept: FAMILY MEDICINE CLINIC | Facility: CLINIC | Age: 46
End: 2021-04-21

## 2021-04-22 ENCOUNTER — TELEPHONE (OUTPATIENT)
Dept: FAMILY MEDICINE CLINIC | Facility: CLINIC | Age: 46
End: 2021-04-22

## 2021-04-22 NOTE — TELEPHONE ENCOUNTER
Caller: JANINE    Relationship:     Best call back number: [phone] - JANINE GIVES VERBAL OKAY TO LEAVE MESSAGE ON VOICEMAIL.    What form or medical record are you requesting: DISABILITY    Who is requesting this form or medical record from you: MORIAH    How would you like to receive the form or medical records (pick-up, mail, fax): N/A  If fax, what is the fax number:   If mail, what is the address: N/A  If pick-up, provide patient with address and location details    Timeframe paperwork needed: ASAP    Additional notes: JANINE IS NEEDING DOCTOR CONFIRMATION OF SIGNATURE AND THAT DR. CONNELL AGREES WITH THE FORM AND ALSO NEEDS DATE THE FORM WAS COMPLETED. PLEASE VERBALLY CONSENT WITH PHONE NUMBER ABOVE.

## 2021-04-26 ENCOUNTER — TELEPHONE (OUTPATIENT)
Dept: FAMILY MEDICINE CLINIC | Facility: CLINIC | Age: 46
End: 2021-04-26

## 2021-04-26 ENCOUNTER — READMISSION MANAGEMENT (OUTPATIENT)
Dept: CALL CENTER | Facility: HOSPITAL | Age: 46
End: 2021-04-26

## 2021-04-26 ENCOUNTER — TRANSITIONAL CARE MANAGEMENT TELEPHONE ENCOUNTER (OUTPATIENT)
Dept: CALL CENTER | Facility: HOSPITAL | Age: 46
End: 2021-04-26

## 2021-04-26 NOTE — OUTREACH NOTE
Call Center TCM Note      Responses   [facility] patient discharged from?  Non- [[facility] ]   Does the patient have one of the following disease processes/diagnoses(primary or secondary)?  COVID-19   TCM attempt successful?  Yes   Call start time  1603   Call end time  1623   Discharge diagnosis  covid   Is patient permission given to speak with other caregiver?  No   Meds reviewed with patient/caregiver?  Yes [new meds dexamethasone, xarelto, zinc.]   Is the patient having any side effects they believe may be caused by any medication additions or changes?  No   Does the patient have all medications ordered at discharge?  Yes   Is the patient taking all medications as directed (includes completed medication regime)?  Yes   Medication comments  Patient reports that he does have an albuterol inhaler available.    Does the patient have a primary care provider?   Yes   Does the patient have an appointment with their PCP within 7 days of discharge?  Yes   Comments regarding PCP  PCP Dr Ed Dong. My Chart Video visit 4/27/21 815am   Has the patient kept scheduled appointments due by today?  N/A   Has home health visited the patient within 72 hours of discharge?  N/A   DME comments  Patient did not require O2 at discharge.    Psychosocial issues?  No   Comments  Patient has home pulse ox. Reports sats usually 92-93% on room air. Reviewed perimeters to return to ER.    Did the patient receive a copy of their discharge instructions?  Yes   Nursing interventions  Reviewed instructions with patient   What is the patient's perception of their health status since discharge?  Improving [Patient denies fever. No loss of taste/smell. Patient states has occasional dry cough. Reports some shortness of breath with taking a shower. States gets short winded with much activity. ]   Is the patient/caregiver able to teach back signs and symptoms related to disease process for when to call PCP?  Yes   Is the  patient/caregiver able to teach back signs and symptoms related to disease process for when to call 911?  Yes   Is the patient/caregiver able to teach back the hierarchy of who to call/visit for symptoms/problems? PCP, Specialist, Home health nurse, Urgent Care, ED, 911  Yes   If the patient is a current smoker, are they able to teach back resources for cessation?  Not a smoker   Additional teach back comments  Patient to do deep breathing exercises every hour while awake.    TCM call completed?  Yes   Wrap up additional comments  Patient reports that he was advised to quarantine until 4/25/21. Patient states that family has disinfected, cleaned house. He reports that he masks and wears gloves to come out of room.           Michelle Campbell RN    4/26/2021, 16:23 EDT

## 2021-04-26 NOTE — TELEPHONE ENCOUNTER
Caller: Jamie Suárez    Relationship to patient: Self    Best call back number:     New or established patient?  [] New  [x] Established    Date of discharge: 04/22/21    Facility discharged from: [facility]    Diagnosis/Symptoms: COVID    Length of stay (If applicable): 7 DAYS    Specialty Only: Did you see a [facility] provider?    [x] Yes  [] No  If so, who? A PULMONARY DOCTOR, PATIENT DOES NOT KNOW THE NAME.

## 2021-04-26 NOTE — OUTREACH NOTE
Prep Survey      Responses   [facility] facility patient discharged from?  Non-BH   Is LACE score < 7 ?  Non-BH Discharge   Emergency Room discharge w/ pulse ox?  No   Eligibility  [facility]   Date of Admission  04/16/21   Date of Discharge  04/22/21   Discharge diagnosis  covid   Does the patient have one of the following disease processes/diagnoses(primary or secondary)?  COVID-19   Prep survey completed?  Yes          Juliet Saunders RN

## 2021-04-27 ENCOUNTER — TELEMEDICINE (OUTPATIENT)
Dept: FAMILY MEDICINE CLINIC | Facility: CLINIC | Age: 46
End: 2021-04-27

## 2021-04-27 DIAGNOSIS — Z86.16 HISTORY OF COVID-19: ICD-10-CM

## 2021-04-27 DIAGNOSIS — Z09 HOSPITAL DISCHARGE FOLLOW-UP: Primary | ICD-10-CM

## 2021-04-27 PROCEDURE — 99213 OFFICE O/P EST LOW 20 MIN: CPT | Performed by: FAMILY MEDICINE

## 2021-04-27 NOTE — PROGRESS NOTES
Subjective   Jamie Suárez is a 45 y.o. male.     History of Present Illness     45-year-old male presents today via video visit for hospital discharge follow-up after being admitted for 7 days for acute respiratory failure and pneumonia secondary to COVID-19.    Discharge Summaries  - documented in this encounter  Enrike Ying MD - 2021 11:56 AM EDT  Formatting of this note is different from the original.  Images from the original note were not included.    [facility]    Patient Identification:  Name: Jamie Suárez  Age: 45 yr/o  Sex: male  : 1975  MRN: [id number]  Primary Care Physician: System, Provider Not In    Admit date: 2021  Discharge date and time: 2021   Discharged Condition: Stable    Discharge Diagnoses:Principal Problem:  Acute respiratory failure due to COVID-19 (CMS/HCC) (2021) POA: Unknown  Active Problems:  Diabetes mellitus (CMS/HCC) () POA: Unknown  Polycythemia (2021) POA: Unknown      Brief HPI:  Patient presented to ED w/ c/o cough, dyspnea, body aches x 10 days. Had been tx'd for PNA by his PCP w/ Doxy/prednisone w/o improvement.    PMHX:   Past Medical History:   Diagnosis Date   • Bronchitis   • Diabetes mellitus (CMS/HCC)   • GERD (gastroesophageal reflux disease)   • Heartburn   • Pneumonia   • Reflux     PSHX:   Past Surgical History:   Procedure Laterality Date   • APPENDECTOMY   • HERNIA REPAIR   x 2   • SHOULDER ARTHROSCOPY Right   done in [place] (removed bone spurs & rotator cuff repair)     Hospital Course:  Chest imaging showing peripheral infiltrates. Testing confirmed COVID positive. Complicated by acute hypoxic respiratory failure requiring supplemental O2. Started on Decadron, Remdesivir. Due to increased O2 demands, also given Tocilizumab and convalescent plasma. Patient symptomatically feeling much better and walking around in room. However, still requiring supplemental O2. Will d/c home to  continue Decadron course and make referral to [facility]. Pulm recommending keeping on AC for a month after discharge.     Patient is not on supplemental O2.  Oxygen saturation 92 to 93%.  Heart rate in the 80s.  He completed his Decadron.  He is taking his Xarelto appropriately.  Having shortness of breath intermittently.  Has not needed albuterol inhaler.  As discussed with pulmonary physician in the hospital will take time to get back to baseline.          The following portions of the patient's history were reviewed and updated as appropriate: allergies, current medications, past family history, past medical history, past social history, past surgical history and problem list.    Review of Systems   Constitutional: Positive for fatigue. Negative for chills and fever.   Respiratory: Positive for cough and shortness of breath.    Cardiovascular: Negative for chest pain, palpitations and leg swelling.   Gastrointestinal: Negative for abdominal pain, nausea and vomiting.       Objective   Physical Exam  Constitutional:       Appearance: He is well-developed.   HENT:      Head: Normocephalic and atraumatic.      Right Ear: External ear normal.      Left Ear: External ear normal.   Pulmonary:      Effort: Pulmonary effort is normal. No respiratory distress.   Neurological:      Mental Status: He is alert and oriented to person, place, and time.   Psychiatric:         Behavior: Behavior normal.                 Assessment/Plan     Diagnoses and all orders for this visit:    1. Hospital discharge follow-up (Primary)    2. History of COVID-19      Continue monitoring oxygen and pulse.  Continue to do deep breathing exercises every hour while awake.  Continue Xarelto 1 month post discharge as recommended by pulmonary.    We will fill out [facility] paperwork to be off of work for the next month.  Return to clinic in 1 month for follow-up or sooner if needed.    Time spent during visit: 15 minutes.

## 2021-05-10 RX ORDER — DAPAGLIFLOZIN 10 MG/1
TABLET, FILM COATED ORAL
Qty: 30 TABLET | Refills: 11 | OUTPATIENT
Start: 2021-05-10

## 2021-05-10 RX ORDER — SITAGLIPTIN 100 MG/1
TABLET, FILM COATED ORAL
Qty: 30 TABLET | Refills: 11 | OUTPATIENT
Start: 2021-05-10

## 2021-05-11 ENCOUNTER — TELEPHONE (OUTPATIENT)
Dept: ENDOCRINOLOGY | Age: 46
End: 2021-05-11

## 2021-05-11 ENCOUNTER — TELEPHONE (OUTPATIENT)
Dept: FAMILY MEDICINE CLINIC | Facility: CLINIC | Age: 46
End: 2021-05-11

## 2021-05-11 NOTE — TELEPHONE ENCOUNTER
PATIENT CALLED IN ADVISED UNABLE TO FILL MEDS WITHOUT APPT CALL DROPPED TRIED TO CALL PATIENT BACK NO ANSWER [phone]

## 2021-05-11 NOTE — TELEPHONE ENCOUNTER
Caller: Jamie Suárez    Relationship to patient: Self    Best call back number: [phone]    Chief complaint: CONSISTENT THROBBING PAIN RUNNING DOWN THE RIGHT SIDE OF PATIENT'S FACE INTO HIS EYES - BEEN ONGOING FOR 3 DAYS NOW. HAD A RECENT NOSE BLEED AND VISIT TO THE ER. PATIENT HAS STOPPED TAKING HIS BLOOD THINNERS AND IS NOW TAKING BABY ASPIRIN.    Type of visit: MYCHART VIDEO/SAME DAY    Requested date: ASAP    If rescheduling, when is the original appointment: N/A - NO APPOINTMENT AVAILABLE DURING THE PATIENT'S REQUESTED TIME

## 2021-05-11 NOTE — TELEPHONE ENCOUNTER
I returned pt phone call.  Pt is going to go to Urgent care to be further evaluated.  Pt states Hospital took him off blood thinner he didn't stop taking it on his own.  Please advise if you want him to do anything different.  Thanks

## 2021-05-14 ENCOUNTER — TELEPHONE (OUTPATIENT)
Dept: FAMILY MEDICINE CLINIC | Facility: CLINIC | Age: 46
End: 2021-05-14

## 2021-05-17 ENCOUNTER — OFFICE VISIT (OUTPATIENT)
Dept: ENDOCRINOLOGY | Age: 46
End: 2021-05-17

## 2021-05-17 VITALS
WEIGHT: 246 LBS | DIASTOLIC BLOOD PRESSURE: 92 MMHG | OXYGEN SATURATION: 98 % | HEIGHT: 66 IN | SYSTOLIC BLOOD PRESSURE: 130 MMHG | BODY MASS INDEX: 39.53 KG/M2

## 2021-05-17 DIAGNOSIS — E11.65 TYPE 2 DIABETES MELLITUS WITH HYPERGLYCEMIA, WITHOUT LONG-TERM CURRENT USE OF INSULIN (HCC): ICD-10-CM

## 2021-05-17 DIAGNOSIS — E11.9 TYPE 2 DIABETES MELLITUS WITHOUT COMPLICATION, WITHOUT LONG-TERM CURRENT USE OF INSULIN (HCC): Primary | ICD-10-CM

## 2021-05-17 PROCEDURE — 99213 OFFICE O/P EST LOW 20 MIN: CPT | Performed by: NURSE PRACTITIONER

## 2021-05-17 RX ORDER — DAPAGLIFLOZIN 10 MG/1
10 TABLET, FILM COATED ORAL DAILY
Qty: 90 TABLET | Refills: 1 | Status: SHIPPED | OUTPATIENT
Start: 2021-05-17 | End: 2021-11-24

## 2021-05-17 RX ORDER — ASPIRIN 81 MG/1
TABLET, COATED ORAL
COMMUNITY
Start: 2021-05-04 | End: 2021-11-14

## 2021-05-17 RX ORDER — RIVAROXABAN 20 MG/1
TABLET, FILM COATED ORAL
COMMUNITY
Start: 2021-04-22 | End: 2021-11-14

## 2021-05-17 RX ORDER — GLIPIZIDE 10 MG/1
10 TABLET, FILM COATED, EXTENDED RELEASE ORAL DAILY
Qty: 180 TABLET | Refills: 2 | Status: SHIPPED | OUTPATIENT
Start: 2021-05-17 | End: 2021-05-25

## 2021-05-17 RX ORDER — ZINC SULFATE 50(220)MG
220 CAPSULE ORAL DAILY
COMMUNITY
Start: 2021-04-22 | End: 2021-11-14

## 2021-05-17 NOTE — PROGRESS NOTES
"Chief Complaint  Diabetes and Med Refill (farxiga and januivia)    Subjective          Jamie Suárez presents to [facility] ENDOCRINOLOGY  History of Present Illness  Type 2 dm     Diagnosed in March 2020  Was in hospital for 7 days with covid a few weeks ago   Today in visit pt reports being on januvia 100mg daily, metformin 100mg BID, glipizide 10mg BID , farxiga 10mg daily    checking his blood sugars daily ( average )  Dm retinopathy - no  Last eye exam - UTD  Dm nephropathy -none  Dm neuropathy -no  CAD -no   CVA -no  Episodes of hypoglycemia -no  Pt is physically active, goal weight 180lbs  Following a diabetic diet   \" I thought my meds were going to be decreased this visit\"  Lab Results   Component Value Date    HGBA1C 5.7 (H) 04/17/2021         Objective   Vital Signs:   /92 (BP Location: Right arm, Patient Position: Sitting, Cuff Size: Adult)   Ht 167.6 cm (65.98\")   Wt 112 kg (246 lb)   SpO2 98%   BMI 39.72 kg/m²     Physical Exam  Vitals reviewed.   Constitutional:       General: He is not in acute distress.  HENT:      Head: Normocephalic and atraumatic.   Cardiovascular:      Rate and Rhythm: Normal rate and regular rhythm.   Pulmonary:      Effort: Pulmonary effort is normal. No respiratory distress.   Musculoskeletal:         General: No signs of injury. Normal range of motion.      Cervical back: Normal range of motion and neck supple.   Skin:     General: Skin is warm and dry.   Neurological:      Mental Status: He is alert and oriented to person, place, and time. Mental status is at baseline.   Psychiatric:         Mood and Affect: Mood normal.         Behavior: Behavior normal.         Thought Content: Thought content normal.         Judgment: Judgment normal.        Result Review :   The following data was reviewed by: KAYLA Cifuentes on 05/17/2021:  Common labs    Common Labsle 4/21/21 4/22/21 5/4/21   WBC 11.99 (A) 10.99 10.65   Hemoglobin 18.6 (A) 19.3 " (A) 19.1 (A)   Hematocrit 56.0 (A) 59.3 (A) 57.4 (A)   Platelets 281 281 153   (A) Abnormal value                      Assessment and Plan    Diagnoses and all orders for this visit:    1. Type 2 diabetes mellitus without complication, without long-term current use of insulin (CMS/[facility]) (Primary)  -     Hemoglobin A1c; Future  -     Comprehensive Metabolic Panel; Future  -     Lipid Panel; Future  -     Microalbumin / Creatinine Urine Ratio - Urine, Clean Catch; Future    Other orders  -     SITagliptin (Januvia) 100 MG tablet; Take 1 tablet by mouth Daily.  Dispense: 90 tablet; Refill: 1  -     Dapagliflozin Propanediol (Farxiga) 10 MG tablet; Take 10 mg by mouth Daily.  Dispense: 90 tablet; Refill: 1        Follow Up   Return in about 6 months (around 11/17/2021).   Continue januvia 100mg daily, metformin 100mg BID, glipizide 10mg daily, farxiga 10mg daily    Continue diabetic diet  Continue exercise   Change glipizide to once daily with dinner     Patient was given instructions and counseling regarding his condition or for health maintenance advice. Please see specific information pulled into the AVS if appropriate.   Carrie Henao APRN

## 2021-05-25 DIAGNOSIS — E11.65 TYPE 2 DIABETES MELLITUS WITH HYPERGLYCEMIA, WITHOUT LONG-TERM CURRENT USE OF INSULIN (HCC): ICD-10-CM

## 2021-05-25 RX ORDER — GLIPIZIDE 10 MG/1
TABLET, FILM COATED, EXTENDED RELEASE ORAL
Qty: 180 TABLET | Refills: 2 | Status: SHIPPED | OUTPATIENT
Start: 2021-05-25 | End: 2022-07-01

## 2021-06-01 ENCOUNTER — OFFICE VISIT (OUTPATIENT)
Dept: FAMILY MEDICINE CLINIC | Facility: CLINIC | Age: 46
End: 2021-06-01

## 2021-06-01 VITALS
HEART RATE: 91 BPM | RESPIRATION RATE: 16 BRPM | WEIGHT: 250 LBS | SYSTOLIC BLOOD PRESSURE: 147 MMHG | TEMPERATURE: 97.7 F | BODY MASS INDEX: 40.18 KG/M2 | DIASTOLIC BLOOD PRESSURE: 99 MMHG | HEIGHT: 66 IN | OXYGEN SATURATION: 94 %

## 2021-06-01 DIAGNOSIS — Z86.16 HISTORY OF COVID-19: Primary | ICD-10-CM

## 2021-06-01 DIAGNOSIS — R06.02 SOB (SHORTNESS OF BREATH) ON EXERTION: ICD-10-CM

## 2021-06-01 PROBLEM — E11.9 DIABETES MELLITUS (HCC): Status: ACTIVE | Noted: 2021-06-01

## 2021-06-01 PROBLEM — U07.1 ACUTE RESPIRATORY FAILURE DUE TO COVID-19: Status: ACTIVE | Noted: 2021-04-16

## 2021-06-01 PROBLEM — D75.1 POLYCYTHEMIA: Status: ACTIVE | Noted: 2021-04-19

## 2021-06-01 PROBLEM — J96.00 ACUTE RESPIRATORY FAILURE DUE TO COVID-19: Status: ACTIVE | Noted: 2021-04-16

## 2021-06-01 PROCEDURE — 99213 OFFICE O/P EST LOW 20 MIN: CPT | Performed by: FAMILY MEDICINE

## 2021-06-01 NOTE — PROGRESS NOTES
Maria Del Rosario Suárez is a 45 y.o. male.     History of Present Illness     45-year-old male presents today for follow-up on Covid; was admitted in the hospital in April for pneumonia due to COVID-19.  He was discharged home on blood thinners however then presented to the ED May with epistaxis.  This was then discontinued.  Advised him to continue low-dose aspirin.  He was seen in the urgent care May 11 for intractable headache.    He was given Toradol injection and prescribed Toradol to take as needed at home for pain.    Seen by endocrinology nurse practitioner May 17; follow-up in 6 months.    Oxygen saturation today 94%.    Reports continued shortness of breath on exertion.  However this is improving incrementally however still a daily issue.         The following portions of the patient's history were reviewed and updated as appropriate: allergies, current medications, past family history, past medical history, past social history, past surgical history and problem list.    Review of Systems   Constitutional: Negative for chills and fever.   Respiratory: Positive for shortness of breath. Negative for cough.    Cardiovascular: Negative for chest pain, palpitations and leg swelling.   Gastrointestinal: Negative for abdominal pain, nausea and vomiting.       Objective   Physical Exam  Constitutional:       Appearance: He is well-developed.   HENT:      Head: Normocephalic and atraumatic.   Eyes:      Pupils: Pupils are equal, round, and reactive to light.   Cardiovascular:      Rate and Rhythm: Normal rate and regular rhythm.      Heart sounds: Normal heart sounds. No murmur heard.     Pulmonary:      Effort: Pulmonary effort is normal. No respiratory distress.      Breath sounds: Normal breath sounds. No stridor. No wheezing or rales.   Musculoskeletal:      Cervical back: Normal range of motion and neck supple.   Skin:     General: Skin is warm.   Neurological:      Mental Status: He is alert and oriented  to person, place, and time.   Psychiatric:         Behavior: Behavior normal.                 Assessment/Plan     Diagnoses and all orders for this visit:    1. History of COVID-19 (Primary)    2. SOB (shortness of breath) on exertion    Continuous FMLA through July 19, 2021 due to continued shortness of breath on exertion.  Return to clinic in 1 month for follow-up or sooner if needed.

## 2021-06-08 ENCOUNTER — TELEPHONE (OUTPATIENT)
Dept: FAMILY MEDICINE CLINIC | Facility: CLINIC | Age: 46
End: 2021-06-08

## 2021-06-08 NOTE — TELEPHONE ENCOUNTER
Caller: MORIAH MEHTA    Best call back number: [phone]  EXT [id number]     What form or medical record are you requesting: DISABILITY     Who is requesting this form or medical record from you: MORIAH     How would you like to receive the form or medical records (pick-up, mail, fax): FAX  If fax, what is the fax number: [phone]    Timeframe paperwork needed: ASAP

## 2021-06-11 ENCOUNTER — TELEPHONE (OUTPATIENT)
Dept: FAMILY MEDICINE CLINIC | Facility: CLINIC | Age: 46
End: 2021-06-11

## 2021-06-18 ENCOUNTER — TELEPHONE (OUTPATIENT)
Dept: FAMILY MEDICINE CLINIC | Facility: CLINIC | Age: 46
End: 2021-06-18

## 2021-07-08 ENCOUNTER — TELEMEDICINE (OUTPATIENT)
Dept: FAMILY MEDICINE CLINIC | Facility: CLINIC | Age: 46
End: 2021-07-08

## 2021-07-08 DIAGNOSIS — R06.02 SOB (SHORTNESS OF BREATH) ON EXERTION: ICD-10-CM

## 2021-07-08 DIAGNOSIS — U09.9 POST-COVID SYNDROME: Primary | ICD-10-CM

## 2021-07-08 DIAGNOSIS — J01.00 ACUTE NON-RECURRENT MAXILLARY SINUSITIS: ICD-10-CM

## 2021-07-08 DIAGNOSIS — Z86.16 HISTORY OF COVID-19: ICD-10-CM

## 2021-07-08 PROCEDURE — 99213 OFFICE O/P EST LOW 20 MIN: CPT | Performed by: FAMILY MEDICINE

## 2021-07-08 RX ORDER — AMOXICILLIN AND CLAVULANATE POTASSIUM 875; 125 MG/1; MG/1
1 TABLET, FILM COATED ORAL 2 TIMES DAILY
Qty: 14 TABLET | Refills: 0 | Status: SHIPPED | OUTPATIENT
Start: 2021-07-08 | End: 2021-07-15

## 2021-07-08 NOTE — PROGRESS NOTES
Maria Del Rosario Suárez is a 45 y.o. male.     History of Present Illness     45-year-old male presents today via video visit for 1 month follow-up on COVID-19 hospitalization.    Patient was admitted in April for pneumonia due to COVID-19; discharged home on blood thinners however then presented to ED in May with epistaxis; blood thinners were discontinued he was advised to continue low-dose aspirin.    At last visit was reporting continued shortness of breath on exertion however this was improving incrementally.  I filled out paperwork for continuous FMLA through July 19, 2021 due to continued shortness of breath on exertion.  He had last been seen by pulmonary in March 2021 prior to his hospitalization.    Today patient reports same shortness of breath on exertion; oxygen saturation ranging 93-94%.  Is already established with pulmonary; he is amenable to calling them to be seen.    Acutely over the past 2 days has had increased congestion and productive cough; productive of yellow sputum; increased sinus pressure bilaterally no fevers chills or night sweats no ear pressure or pain.  No worsening of shortness of breath on exertion.  No wheezing.        The following portions of the patient's history were reviewed and updated as appropriate: allergies, current medications, past family history, past medical history, past social history, past surgical history and problem list.    Review of Systems   Constitutional: Negative for chills and fever.   HENT: Positive for congestion and sinus pressure.    Respiratory: Positive for cough and shortness of breath ( On exertion chronic).    Cardiovascular: Negative for chest pain, palpitations and leg swelling.   Gastrointestinal: Negative for abdominal pain, nausea and vomiting.       Objective   Physical Exam  Constitutional:       Appearance: He is well-developed.   HENT:      Head: Normocephalic and atraumatic.      Right Ear: External ear normal.      Left Ear:  External ear normal.   Pulmonary:      Effort: Pulmonary effort is normal. No respiratory distress.   Neurological:      Mental Status: He is alert and oriented to person, place, and time.   Psychiatric:         Behavior: Behavior normal.                 Assessment/Plan     Diagnoses and all orders for this visit:    1. Post-COVID syndrome (Primary)    2. History of COVID-19    3. SOB (shortness of breath) on exertion    4. Acute non-recurrent maxillary sinusitis  -     amoxicillin-clavulanate (Augmentin) 875-125 MG per tablet; Take 1 tablet by mouth 2 (Two) Times a Day for 7 days.  Dispense: 14 tablet; Refill: 0      Set up follow-up with pulmonary; will extend FMLA through the next 6 weeks.  Antibiotics as noted above.  Return to clinic sooner or go to ER for new or worsening symptoms.  Patient expressed understanding and agreeable to plan.    Time spent during visit: 15 minutes.

## 2021-07-21 ENCOUNTER — TELEPHONE (OUTPATIENT)
Dept: FAMILY MEDICINE CLINIC | Facility: CLINIC | Age: 46
End: 2021-07-21

## 2021-07-22 ENCOUNTER — TELEPHONE (OUTPATIENT)
Dept: FAMILY MEDICINE CLINIC | Facility: CLINIC | Age: 46
End: 2021-07-22

## 2021-07-22 NOTE — TELEPHONE ENCOUNTER
EXTENSION OF CARE    JANIEN NEEDS TO KNOW:     WHEN IS THE APPT FOR PULMONARY     PLEASE CALL:    JANINE FROM [facility]  [phone]

## 2021-08-26 DIAGNOSIS — E11.9 NEW ONSET TYPE 2 DIABETES MELLITUS (HCC): ICD-10-CM

## 2021-08-26 DIAGNOSIS — E11.65 TYPE 2 DIABETES MELLITUS WITH HYPERGLYCEMIA, WITHOUT LONG-TERM CURRENT USE OF INSULIN (HCC): ICD-10-CM

## 2021-09-03 ENCOUNTER — TELEPHONE (OUTPATIENT)
Dept: FAMILY MEDICINE CLINIC | Facility: CLINIC | Age: 46
End: 2021-09-03

## 2021-09-03 ENCOUNTER — OFFICE VISIT (OUTPATIENT)
Dept: FAMILY MEDICINE CLINIC | Facility: CLINIC | Age: 46
End: 2021-09-03

## 2021-09-03 VITALS
SYSTOLIC BLOOD PRESSURE: 130 MMHG | WEIGHT: 263 LBS | HEIGHT: 66 IN | TEMPERATURE: 97.5 F | HEART RATE: 103 BPM | OXYGEN SATURATION: 96 % | DIASTOLIC BLOOD PRESSURE: 92 MMHG | RESPIRATION RATE: 16 BRPM | BODY MASS INDEX: 42.27 KG/M2

## 2021-09-03 DIAGNOSIS — Z86.16 HISTORY OF COVID-19: Primary | ICD-10-CM

## 2021-09-03 PROCEDURE — 99213 OFFICE O/P EST LOW 20 MIN: CPT | Performed by: NURSE PRACTITIONER

## 2021-09-03 NOTE — TELEPHONE ENCOUNTER
I returned pt call.  Pt scheduled with Deena on Tuesday.  Pt was advised to go to [facility] if symptoms get worse.  Pt states he feels like he can wait until his appointment and states he will go to [facility] if needed.  Thanks

## 2021-09-03 NOTE — TELEPHONE ENCOUNTER
Caller: Jamie Suárez    Relationship: Self    Best call back number: [phone]    What medication are you requesting:     What are your current symptoms: LEFT EAR PAIN/THROBBING    How long have you been experiencing symptoms: A COUPLE DAYS     Have you had these symptoms before:    [x] Yes  [] No    Have you been treated for these symptoms before:   [x] Yes  [] No    If a prescription is needed, what is your preferred pharmacy and phone number:  [id number] [place] [place] [facility] [address] AT [facility] [phone]  - [phone]   [phone]    Additional notes:

## 2021-09-03 NOTE — PROGRESS NOTES
Maria Del Rosario Suárez is a 45 y.o. male.     History of Present Illness   Patient presents to office for f/u on COVID.  He has been off work and was instructed to remain off work until cleared per pulmonologist. He was unable to get appt until 9/13 but is on cancellation list and has been calling daily to see about sooner appt.  He was hospitalized at [place] for 8 days with COVID pneumonia.  He feels improved but still feels like he gets fatigued more quickly than he used to. He has not yet had repeat CT scan.  COVID pneumonia was not readily apparent on chest xray in the hospital.  He would like to return to work after CT scan if normal.  The following portions of the patient's history were reviewed and updated as appropriate: allergies, current medications, past family history, past medical history, past social history, past surgical history and problem list.    Review of Systems   Constitutional: Positive for fatigue. Negative for chills and fever.   Respiratory: Negative for cough and shortness of breath.    Cardiovascular: Negative for chest pain and palpitations.   Skin: Negative for rash.   Psychiatric/Behavioral: Negative for dysphoric mood and sleep disturbance. The patient is not nervous/anxious.        Objective   Physical Exam  Vitals and nursing note reviewed.   Constitutional:       Appearance: He is well-developed.   Cardiovascular:      Rate and Rhythm: Normal rate and regular rhythm.   Pulmonary:      Effort: Pulmonary effort is normal.      Breath sounds: Normal breath sounds.   Skin:     General: Skin is warm and dry.   Neurological:      Mental Status: He is alert and oriented to person, place, and time.   Psychiatric:         Mood and Affect: Mood normal.         Behavior: Behavior normal.         Thought Content: Thought content normal.         Judgment: Judgment normal.         Assessment/Plan   Diagnoses and all orders for this visit:    1. History of COVID-19 (Primary)  -     CT Chest  Without Contrast Diagnostic

## 2021-09-07 ENCOUNTER — TELEPHONE (OUTPATIENT)
Dept: FAMILY MEDICINE CLINIC | Facility: CLINIC | Age: 46
End: 2021-09-07

## 2021-09-11 ENCOUNTER — HOSPITAL ENCOUNTER (OUTPATIENT)
Dept: CT IMAGING | Facility: HOSPITAL | Age: 46
Discharge: HOME | End: 2021-09-11
Admitting: NURSE PRACTITIONER

## 2021-09-11 PROCEDURE — 71250 CT THORAX DX C-: CPT

## 2021-09-14 ENCOUNTER — TELEMEDICINE (OUTPATIENT)
Dept: FAMILY MEDICINE CLINIC | Facility: CLINIC | Age: 46
End: 2021-09-14

## 2021-09-14 ENCOUNTER — TELEPHONE (OUTPATIENT)
Dept: FAMILY MEDICINE CLINIC | Facility: CLINIC | Age: 46
End: 2021-09-14

## 2021-09-14 DIAGNOSIS — Z86.16 HISTORY OF COVID-19: Primary | ICD-10-CM

## 2021-09-14 PROCEDURE — 99213 OFFICE O/P EST LOW 20 MIN: CPT | Performed by: FAMILY MEDICINE

## 2021-09-14 NOTE — PROGRESS NOTES
Subjective   Jamie Suárez is a 45 y.o. male.     History of Present Illness     45-year-old male presents today via video visit to discuss return to work certification form.  Reports he followed with pulmonologist yesterday.  Reports PFTs results were normal.  Had a CT chest done by another provider in this office; did not show any acute infection however did show some scarring.  Patient drives a forklift.  Feels like he is comfortable going back to work; has some shortness of breath on exertion sometimes still; feels like with the nature of his job he can easily take a break if needed.    Patient reports last provider who saw him in this office told him he did not need a COVID-19 vaccination since he had survived COVID-19 after hospitalization in May.  Discussed with patient that he did nearly die during that hospitalization; he is also morbidly obese with type 2 diabetes mellitus.  He is therefore high risk.  Discussed that antibodies from COVID-19 infection on average last 3 to 8 months therefore it is still recommended he complete COVID-19 vaccination in order to significantly reduce his risk for morbidity and mortality with possible repeat infection as well as to help decrease transmission of COVID-19 in our community and therefore decrease strain on overwhelmed hospitals and medical staff.  Patient expressed understanding.    The following portions of the patient's history were reviewed and updated as appropriate: allergies, current medications, past family history, past medical history, past social history, past surgical history and problem list.    Review of Systems   Constitutional: Negative for chills and fever.   Respiratory: Negative for cough and shortness of breath.    Cardiovascular: Negative for chest pain, palpitations and leg swelling.   Gastrointestinal: Negative for abdominal pain, nausea and vomiting.       Objective   Physical Exam  Constitutional:       Appearance: He is well-developed.    HENT:      Head: Normocephalic and atraumatic.      Right Ear: External ear normal.      Left Ear: External ear normal.   Pulmonary:      Effort: Pulmonary effort is normal. No respiratory distress.   Neurological:      Mental Status: He is alert and oriented to person, place, and time.   Psychiatric:         Behavior: Behavior normal.                 Assessment/Plan     Diagnoses and all orders for this visit:    1. History of COVID-19 (Primary)        Cleared to return to work without restrictions September 20, 2021.  Patient to complete COVID-19 vaccination.  Continue care per pulmonary.  Return to clinic as needed.    Time spent during visit: 20 minutes.

## 2021-09-14 NOTE — TELEPHONE ENCOUNTER
Caller: Jamie Suárez    Relationship: Self    Best call back number: [phone]     What is the best time to reach you: ANY TIME     Who are you requesting to speak with (clinical staff, provider,  specific staff member): CLINICAL STAFF     What was the call regarding: PATIENT WAS RETURNING A MISSED CALL FROM THE OFFICE- THERE WERE NO NOTES ON PATIENTS CHART FOR HUB TO SHARE OR READ AND HE DIDN'T KNOW THE NAME OF WHO CALLED HIM.      PLEASE ADVISE.

## 2021-09-22 ENCOUNTER — TELEPHONE (OUTPATIENT)
Dept: FAMILY MEDICINE CLINIC | Facility: CLINIC | Age: 46
End: 2021-09-22

## 2021-10-19 ENCOUNTER — TELEMEDICINE (OUTPATIENT)
Dept: FAMILY MEDICINE CLINIC | Facility: CLINIC | Age: 46
End: 2021-10-19

## 2021-10-19 ENCOUNTER — TELEPHONE (OUTPATIENT)
Dept: FAMILY MEDICINE CLINIC | Facility: CLINIC | Age: 46
End: 2021-10-19

## 2021-10-19 DIAGNOSIS — J45.41 MODERATE PERSISTENT ASTHMA WITH ACUTE EXACERBATION: ICD-10-CM

## 2021-10-19 DIAGNOSIS — E66.01 MORBID OBESITY (HCC): Primary | ICD-10-CM

## 2021-10-19 DIAGNOSIS — R05.9 COUGH: ICD-10-CM

## 2021-10-19 PROCEDURE — 99213 OFFICE O/P EST LOW 20 MIN: CPT | Performed by: NURSE PRACTITIONER

## 2021-10-19 RX ORDER — BROMPHENIRAMINE MALEATE, PSEUDOEPHEDRINE HYDROCHLORIDE, AND DEXTROMETHORPHAN HYDROBROMIDE 2; 30; 10 MG/5ML; MG/5ML; MG/5ML
5 SYRUP ORAL 4 TIMES DAILY PRN
Qty: 118 ML | Refills: 0 | OUTPATIENT
Start: 2021-10-19 | End: 2021-11-14

## 2021-10-19 NOTE — PROGRESS NOTES
Chief Complaint  Cough (still lingering from covid, mostly at HS) and Med Refill (wants cough med refilled)    Subjective          You have chosen to receive care through a telephone visit. Do you consent to use a telephone visit for your medical care today? Yes    This was an audio and video enabled telemedicine encounter.    Jamie presents to [facility]    Review of Systems   Constitutional: Negative.    HENT: Negative.    Eyes: Negative.    Respiratory: Positive for cough. Negative for shortness of breath, wheezing and stridor.    Cardiovascular: Negative for chest pain, palpitations and leg swelling.   Endocrine: Negative.    Genitourinary: Negative.    Musculoskeletal: Negative.    Skin: Negative.    Allergic/Immunologic: Negative.    Neurological: Negative.    Hematological: Negative.    Psychiatric/Behavioral: Negative.    46-year-old male presents today for telehealth visit complaining of lingering cough.  States that he has been treated for ongoing symptoms from COVID-19 for the past month where he was hospitalized with Covid pneumonia.  He is feeling much better denies any fever pain shortness of breath or any other symptoms.  States that the Bromfed cough syrup helped him to sleep and quiet his cough long enough for him to get rest.  He is out of that medication.    Reviewed with him effects on blood pressure denies any headache blurred vision dizziness flushing.      He has an active problem list of the following      Patient Active Problem List   Diagnosis   • Disorder of rotator cuff   • Shoulder pain   • GERD (gastroesophageal reflux disease)   • Cervical stenosis of spinal canal   • Left cervical radiculopathy   • ERRONEOUS ENCOUNTER--DISREGARD   • SANA (obstructive sleep apnea)   • Morbid obesity (HCC)   • Restless legs syndrome (RLS)   • New onset type 2 diabetes mellitus (HCC)   • Acute respiratory failure due to COVID-19 (HCC)   • Polycythemia   • Diabetes mellitus  ([facility])     Since his last visit is he overall felt well    He is compliant on the following medications  Current Outpatient Medications on File Prior to Visit   Medication Sig Dispense Refill   • albuterol sulfate  (90 Base) MCG/ACT inhaler Inhale 2 puffs Every 4 (Four) Hours As Needed for Wheezing. 8 g 5   • Aspirin Low Dose 81 MG EC tablet      • Blood Glucose Monitoring Suppl (ACCU-CHEK PRIYANKA PLUS) w/Device kit 1 device 1 kit 0   • cholecalciferol (Vitamin D, Cholecalciferol,) 25 MCG (1000 UT) tablet Take 1 tablet by mouth Daily. 30 tablet 2   • Dapagliflozin Propanediol (Farxiga) 10 MG tablet Take 10 mg by mouth Daily. 90 tablet 1   • fluticasone (Flovent HFA) 110 MCG/ACT inhaler Inhale 1 puff 2 (Two) Times a Day. 1 inhaler 5   • glipizide (GLUCOTROL XL) 10 MG 24 hr tablet TAKE 1 TABLET BY MOUTH TWICE DAILY BEFORE MEALS. 180 tablet 2   • glucose blood (Accu-Chek Priyanka Plus) test strip To check 3 - 4 times a day 100 each 2   • guaifenesin-dextromethorphan (MUCINEX DM)  MG tablet sustained-release 12 hour tablet Take 1 tablet by mouth 2 (Two) Times a Day As Needed (coughing). 20 tablet 0   • hydrocortisone 2.5 % cream APPLY TO AFFECTED AREA OF FOREHEAD TWICE A DAY AS NEEDED FOR FLARES     • Lancets (ACCU-CHEK MULTICLIX) lancets To check 3 - 4 times a day 100 each 2   • metFORMIN (GLUCOPHAGE) 1000 MG tablet TAKE 1 TABLET BY MOUTH TWICE DAILY WITH MEALS 180 tablet 3   • omeprazole (priLOSEC) 40 MG capsule Take 1 capsule by mouth Daily. 90 capsule 3   • ondansetron (ZOFRAN) 4 MG tablet Take 1 tablet by mouth Every 6 (Six) Hours As Needed for Nausea or Vomiting. 12 tablet 0   • predniSONE (DELTASONE) 20 MG tablet Take 3 tablets daily for 4 days then take 2 tablets daily for 3 days. 18 tablet 0   • SITagliptin (Januvia) 100 MG tablet Take 1 tablet by mouth Daily. 90 tablet 1   • Xarelto 20 MG tablet      • zinc sulfate (ZINCATE) 220 (50 Zn) MG capsule Take 220 mg by mouth Daily.     • [DISCONTINUED]  brompheniramine-pseudoephedrine-DM 30-2-10 MG/5ML syrup Take 5 mL by mouth 4 (Four) Times a Day As Needed for Congestion or Cough. 118 mL 0     Current Facility-Administered Medications on File Prior to Visit   Medication Dose Route Frequency Provider Last Rate Last Admin   • albuterol (PROVENTIL) nebulizer solution 0.042% 1.25 mg/3mL  2.5 mg Nebulization Once Ed Dong MD         All chronic conditions are stable without issues    The following portions of the patient's history were reviewed and updated as appropriate: allergies, current medications, past family history, past medical history, past social history, past surgical history, and problem list         Objective   Vital Signs:   Last blood pressure check 122/84  Height 66 inches weight 263 BMI 42  There were no vitals filed for this visit.         VIRTUAL  Physical Exam  Constitutional:       General: He is not in acute distress.     Appearance: He is not ill-appearing.   HENT:      Head: Normocephalic.   Eyes:      Extraocular Movements: Extraocular movements intact.      Conjunctiva/sclera: Conjunctivae normal.   Pulmonary:      Effort: Pulmonary effort is normal. No respiratory distress.   Neurological:      Mental Status: He is alert and oriented to person, place, and time.   Psychiatric:         Mood and Affect: Mood normal.         Behavior: Behavior normal.         Thought Content: Thought content normal.         Judgment: Judgment normal.        Result Review :       CT Chest Without Contrast Diagnostic (09/11/2021 11:21)           Assessment and Plan    Diagnoses and all orders for this visit:    1. Morbid obesity (HCC) (Primary)  Comments:  Healthy diet and exercise 3 times per week    2. Moderate persistent asthma with acute exacerbation  Comments:  Has albuterol inhaler as needed  Stable  Orders:  -     brompheniramine-pseudoephedrine-DM 30-2-10 MG/5ML syrup; Take 5 mL by mouth 4 (Four) Times a Day As Needed for Congestion or Cough.   Dispense: 118 mL; Refill: 0    3. Cough  Comments:  Bromfed ordered as needed  Improving  Still keeps him up at night unable to get rest    Plan  Bromfed ordered as needed at bedtime for rest with cough  Albuterol inhaler available if needed  Continue to work on diet and exercise  Follow-up for annual or scheduled exam  Healthy diet and exercise as tolerated    Video call lasted 6 minutes  Total time with patient 15 minutes reviewing history, orders  Follow Up   Return in about 1 month (around 11/19/2021), or if symptoms worsen or fail to improve.  Patient was given instructions and counseling regarding his condition or for health maintenance advice. Please see specific information pulled into the AVS if appropriate.

## 2021-10-19 NOTE — PATIENT INSTRUCTIONS
"Healthy Eating  Following a healthy eating pattern may help you to achieve and maintain a healthy body weight, reduce the risk of chronic disease, and live a long and productive life. It is important to follow a healthy eating pattern at an appropriate calorie level for your body. Your nutritional needs should be met primarily through food by choosing a variety of nutrient-rich foods.  What are tips for following this plan?  Reading food labels  · Read labels and choose the following:  ? Reduced or low sodium.  ? Juices with 100% fruit juice.  ? Foods with low saturated fats and high polyunsaturated and monounsaturated fats.  ? Foods with whole grains, such as whole wheat, cracked wheat, brown rice, and wild rice.  ? Whole grains that are fortified with folic acid. This is recommended for women who are pregnant or who want to become pregnant.  · Read labels and avoid the following:  ? Foods with a lot of added sugars. These include foods that contain brown sugar, corn sweetener, corn syrup, dextrose, fructose, glucose, high-fructose corn syrup, honey, invert sugar, lactose, malt syrup, maltose, molasses, raw sugar, sucrose, trehalose, or turbinado sugar.  § Do not eat more than the following amounts of added sugar per day:  § 6 teaspoons (25 g) for women.  § 9 teaspoons (38 g) for men.  ? Foods that contain processed or refined starches and grains.  ? Refined grain products, such as white flour, degermed cornmeal, white bread, and white rice.  Shopping  · Choose nutrient-rich snacks, such as vegetables, whole fruits, and nuts. Avoid high-calorie and high-sugar snacks, such as potato chips, fruit snacks, and candy.  · Use oil-based dressings and spreads on foods instead of solid fats such as butter, stick margarine, or cream cheese.  · Limit pre-made sauces, mixes, and \"instant\" products such as flavored rice, instant noodles, and ready-made pasta.  · Try more plant-protein sources, such as tofu, tempeh, black beans, " edamame, lentils, nuts, and seeds.  · Explore eating plans such as the Mediterranean diet or vegetarian diet.  Cooking  · Use oil to sauté or stir-lancaster foods instead of solid fats such as butter, stick margarine, or lard.  · Try baking, boiling, grilling, or broiling instead of frying.  · Remove the fatty part of meats before cooking.  · Steam vegetables in water or broth.  Meal planning    · At meals, imagine dividing your plate into fourths:  ? One-half of your plate is fruits and vegetables.  ? One-fourth of your plate is whole grains.  ? One-fourth of your plate is protein, especially lean meats, poultry, eggs, tofu, beans, or nuts.  · Include low-fat dairy as part of your daily diet.    Lifestyle  · Choose healthy options in all settings, including home, work, school, restaurants, or stores.  · Prepare your food safely:  ? Wash your hands after handling raw meats.  ? Keep food preparation surfaces clean by regularly washing with hot, soapy water.  ? Keep raw meats separate from ready-to-eat foods, such as fruits and vegetables.  ? , meat, poultry, and eggs to the recommended internal temperature.  ? Store foods at safe temperatures. In general:  § Keep cold foods at 40°F (4.4°C) or below.  § Keep hot foods at 140°F (60°C) or above.  § Keep your freezer at 0°F (-17.8°C) or below.  § Foods are no longer safe to eat when they have been between the temperatures of 40°-140°F (4.4-60°C) for more than 2 hours.  What foods should I eat?  Fruits  Aim to eat 2 cup-equivalents of fresh, canned (in natural juice), or frozen fruits each day. Examples of 1 cup-equivalent of fruit include 1 small apple, 8 large strawberries, 1 cup canned fruit, ½ cup dried fruit, or 1 cup 100% juice.  Vegetables  Aim to eat 2½-3 cup-equivalents of fresh and frozen vegetables each day, including different varieties and colors. Examples of 1 cup-equivalent of vegetables include 2 medium carrots, 2 cups raw, leafy greens, 1 cup  chopped vegetable (raw or cooked), or 1 medium baked potato.  Grains  Aim to eat 6 ounce-equivalents of whole grains each day. Examples of 1 ounce-equivalent of grains include 1 slice of bread, 1 cup ready-to-eat cereal, 3 cups popcorn, or ½ cup cooked rice, pasta, or cereal.  Meats and other proteins  Aim to eat 5-6 ounce-equivalents of protein each day. Examples of 1 ounce-equivalent of protein include 1 egg, 1/2 cup nuts or seeds, or 1 tablespoon (16 g) peanut butter. A cut of meat or fish that is the size of a deck of cards is about 3-4 ounce-equivalents.  · Of the protein you eat each week, try to have at least 8 ounces come from seafood. This includes salmon, trout, herring, and anchovies.  Dairy  Aim to eat 3 cup-equivalents of fat-free or low-fat dairy each day. Examples of 1 cup-equivalent of dairy include 1 cup (240 mL) milk, 8 ounces (250 g) yogurt, 1½ ounces (44 g) natural cheese, or 1 cup (240 mL) fortified soy milk.  Fats and oils  · Aim for about 5 teaspoons (21 g) per day. Choose monounsaturated fats, such as canola and olive oils, avocados, peanut butter, and most nuts, or polyunsaturated fats, such as sunflower, corn, and soybean oils, walnuts, pine nuts, sesame seeds, sunflower seeds, and flaxseed.  Beverages  · Aim for six 8-oz glasses of water per day. Limit coffee to three to five 8-oz cups per day.  · Limit caffeinated beverages that have added calories, such as soda and energy drinks.  · Limit alcohol intake to no more than 1 drink a day for nonpregnant women and 2 drinks a day for men. One drink equals 12 oz of beer (355 mL), 5 oz of wine (148 mL), or 1½ oz of hard liquor (44 mL).  Seasoning and other foods  · Avoid adding excess amounts of salt to your foods. Try flavoring foods with herbs and spices instead of salt.  · Avoid adding sugar to foods.  · Try using oil-based dressings, sauces, and spreads instead of solid fats.  This information is based on general [place] nutrition guidelines.  For more information, visit choosemyplate.gov. Exact amounts may vary based on your nutrition needs.  Summary  · A healthy eating plan may help you to maintain a healthy weight, reduce the risk of chronic diseases, and stay active throughout your life.  · Plan your meals. Make sure you eat the right portions of a variety of nutrient-rich foods.  · Try baking, boiling, grilling, or broiling instead of frying.  · Choose healthy options in all settings, including home, work, school, restaurants, or stores.  This information is not intended to replace advice given to you by your health care provider. Make sure you discuss any questions you have with your health care provider.  Document Revised: 04/01/2019 Document Reviewed: 04/01/2019  [employer] Patient Education © 2021 [employer]  Cough, Adult  A cough helps to clear your throat and lungs. A cough may be a sign of an illness or another medical condition.  An acute cough may only last 2-3 weeks, while a chronic cough may last 8 or more weeks.  Many things can cause a cough. They include:  · Germs (viruses or bacteria) that attack the airway.  · Breathing in things that bother (irritate) your lungs.  · Allergies.  · Asthma.  · Mucus that runs down the back of your throat (postnasal drip).  · Smoking.  · Acid backing up from the stomach into the tube that moves food from the mouth to the stomach (gastroesophageal reflux).  · Some medicines.  · Lung problems.  · Other medical conditions, such as heart failure or a blood clot in the lung (pulmonary embolism).  Follow these instructions at home:  Medicines  · Take over-the-counter and prescription medicines only as told by your doctor.  · Talk with your doctor before you take medicines that stop a cough (cough suppressants).  Lifestyle    · Do not smoke, and try not to be around smoke. Do not use any products that contain nicotine or tobacco, such as cigarettes, e-cigarettes, and chewing tobacco. If you need help quitting,  ask your doctor.  · Drink enough fluid to keep your pee (urine) pale yellow.  · Avoid caffeine.  · Do not drink alcohol if your doctor tells you not to drink.    General instructions    · Watch for any changes in your cough. Tell your doctor about them.  · Always cover your mouth when you cough.  · Stay away from things that make you cough, such as perfume, candles, campfire smoke, or cleaning products.  · If the air is dry, use a cool mist vaporizer or humidifier in your home.  · If your cough is worse at night, try using extra pillows to raise your head up higher while you sleep.  · Rest as needed.  · Keep all follow-up visits as told by your doctor. This is important.    Contact a doctor if:  · You have new symptoms.  · You cough up pus.  · Your cough does not get better after 2-3 weeks, or your cough gets worse.  · Cough medicine does not help your cough and you are not sleeping well.  · You have pain that gets worse or pain that is not helped with medicine.  · You have a fever.  · You are losing weight and you do not know why.  · You have night sweats.  Get help right away if:  · You cough up blood.  · You have trouble breathing.  · Your heartbeat is very fast.  These symptoms may be an emergency. Do not wait to see if the symptoms will go away. Get medical help right away. Call your local emergency services (911 in the [place] Do not drive yourself to the hospital.  Summary  · A cough helps to clear your throat and lungs. Many things can cause a cough.  · Take over-the-counter and prescription medicines only as told by your doctor.  · Always cover your mouth when you cough.  · Contact a doctor if you have new symptoms or you have a cough that does not get better or gets worse.  This information is not intended to replace advice given to you by your health care provider. Make sure you discuss any questions you have with your health care provider.  Document Revised: 02/05/2021 Document Reviewed:  01/06/2020  [employer] Patient Education © 2021 [employer]  Brompheniramine; Pseudoephedrine extended-release capsules or tablets  What is this medicine?  BROMPHENIRAMINE; PSEUDOEPHEDRINE (brome fen LAINA a meannelise; kathe christian e FED rin) is a combination of an antihistamine and a decongestant. It is used to treat the symptoms of allergy and colds. This medicine will not treat an infection.  This medicine may be used for other purposes; ask your health care provider or pharmacist if you have questions.  COMMON BRAND NAME(S): Bromadrine TR, Bromfed, Bromfed-PD, Bromfenex, Bromfenex PD, Bromuphed, Bromuphed PD, Brovex SR, Dallergy-JR, Endafed, Histex SR, Iofed, Iofed PD, Lodrane, Lodrane 12 D, Lodrane 24 D, LoHist-12D, Pseubrom, Pseubrom-PD, Respahist, [place], Ultrabrom, Ultrabrom PD, Uni-[place]  What should I tell my health care provider before I take this medicine?  They need to know if you have any of the following conditions:  · asthma  · diabetes  · difficulty passing urine  · glaucoma  · heart disease  · high blood pressure  · taken an MAOI like Carbex, Eldepryl, Marplan, Nardil, or Parnate in last 14 days  · thyroid disease  · ulcer or other stomach problem  · an unusual or allergic reaction to brompheniramine, pseudoephedrine, other medicines, foods, dyes, or preservatives  · pregnant or trying to get pregnant  · breast-feeding  How should I use this medicine?  Take this medicine by mouth with a full glass of water. Follow the directions on the prescription label. Do not cut, crush, or chew this medicine. You can take it with or without food. If it upsets your stomach, take it with food. Take your medicine at regular intervals. Do not take your medicine more often than directed.  Talk to your pediatrician regarding the use of this medicine in children. Special care may be needed.  Patients over 65 years old may have a stronger reaction and need a smaller dose.  Overdosage: If you think you have taken too much of this  medicine contact a poison control center or emergency room at once.  NOTE: This medicine is only for you. Do not share this medicine with others.  What if I miss a dose?  If you miss a dose, take it as soon as you can. If it is almost time for your next dose, take only that dose. Do not take double or extra doses.  What may interact with this medicine?  Do not take this medicine with any of the following medications:  · bromocriptine  · MAOIs like Carbex, Eldepryl, Marplan, Nardil, and Parnate  · procarbazine  · some medicines for migraine headaches  · stimulant medicines for attention disorders, weight loss, or to stay awake  This medicine may also interact with the following medications:  · alcohol  · barbiturates, like phenobarbital  · mecamylamine  · medicines for blood pressure  · medicines for chest pain, heart disease, high blood pressure or heart rhythm problems  · medicines for depression, anxiety, or psychotic disturbances  · medicines for sleep  · other medicines for cold, cough or allergy  · reserpine  · some herbal or nutritional supplements  · some medicines for Parkinson's disease  This list may not describe all possible interactions. Give your health care provider a list of all the medicines, herbs, non-prescription drugs, or dietary supplements you use. Also tell them if you smoke, drink alcohol, or use illegal drugs. Some items may interact with your medicine.  What should I watch for while using this medicine?  Visit your doctor or health care professional for regular check ups. Tell your doctor if your symptoms do not improve or if they get worse.  Your mouth may get dry. Chewing sugarless gum or sucking hard candy, and drinking plenty of water may help. Contact your doctor if the problem does not go away or is severe.  This medicine may cause dry eyes and blurred vision. If you wear contact lenses you may feel some discomfort. Lubricating drops may help. See your eye doctor if the problem does  not go away or is severe.  You may get drowsy or dizzy. Do not drive, use machinery, or do anything that needs mental alertness until you know how this medicine affects you. Do not stand or sit up quickly, especially if you are an older patient. This reduces the risk of dizzy or fainting spells. Alcohol may interfere with the effect of this medicine. Avoid alcoholic drinks.  What side effects may I notice from receiving this medicine?  Side effects that you should report to your doctor or health care professional as soon as possible:  · allergic reactions like skin rash, itching or hives, swelling of the face, lips, or tongue  · anxiety, fear  · change in vision  · cold, clammy skin  · confused, faint, lightheaded  · difficulty passing urine  · fast or irregular heartbeat  · hallucinations  · high blood pressure  · seizure  · tremor  · trouble breathing  · vomiting  Side effects that usually do not require medical attention (report to your doctor or health care professional if they continue or are bothersome):  · headache  · loss of appetite  · nervous, restless  · stomach upset, nausea  · trouble sleeping  This list may not describe all possible side effects. Call your doctor for medical advice about side effects. You may report side effects to FDA at 1-295-FDA-0247.  Where should I keep my medicine?  Keep out of the reach of children.  This medicine may cause accidental overdose and death if taken by other adults, children, or pets. Mix any unused medicine with a substance like cat littler or coffee grounds. Then throw the medicine away in a sealed container like a sealed bag or a coffee can with a lid. Do not use the medicine after the expiration date.  Store at room temperature between 15 and 30 degrees C (59 and 86 degrees F). Protect from light. Keep container tightly closed.  NOTE: This sheet is a summary. It may not cover all possible information. If you have questions about this medicine, talk to your  doctor, pharmacist, or health care provider.  © 2021 [employer] (2015-11-11 14:55:58)

## 2021-10-19 NOTE — TELEPHONE ENCOUNTER
Caller: Jamie Suárez    Relationship: Self    Best call back number: [phone] (    What is the best time to reach you: ANYTIME    Who are you requesting to speak with (clinical staff, provider,  specific staff member): MA     What was the call regarding: PATIENT STATED HE WOULD LIKE TO SPEAK WITH A NURSE    Do you require a callback: YES

## 2021-11-04 ENCOUNTER — OFFICE VISIT (OUTPATIENT)
Dept: FAMILY MEDICINE CLINIC | Facility: CLINIC | Age: 46
End: 2021-11-04

## 2021-11-04 VITALS
BODY MASS INDEX: 42.27 KG/M2 | HEART RATE: 103 BPM | RESPIRATION RATE: 16 BRPM | OXYGEN SATURATION: 97 % | DIASTOLIC BLOOD PRESSURE: 106 MMHG | HEIGHT: 66 IN | WEIGHT: 263 LBS | TEMPERATURE: 97.3 F | SYSTOLIC BLOOD PRESSURE: 150 MMHG

## 2021-11-04 DIAGNOSIS — J06.9 ACUTE URI: ICD-10-CM

## 2021-11-04 PROCEDURE — 99213 OFFICE O/P EST LOW 20 MIN: CPT | Performed by: NURSE PRACTITIONER

## 2021-11-04 RX ORDER — PREDNISONE 20 MG/1
20 TABLET ORAL DAILY
Qty: 5 TABLET | Refills: 0 | OUTPATIENT
Start: 2021-11-04 | End: 2021-11-14

## 2021-11-04 RX ORDER — AMOXICILLIN AND CLAVULANATE POTASSIUM 875; 125 MG/1; MG/1
1 TABLET, FILM COATED ORAL 2 TIMES DAILY
Qty: 20 TABLET | Refills: 0 | OUTPATIENT
Start: 2021-11-04 | End: 2021-11-14

## 2021-11-04 NOTE — PROGRESS NOTES
Subjective   Jamie Suárez is a 46 y.o. male.     History of Present Illness   Jamie Suárez 46 y.o. male who presents for evaluation of sinus pressure and congestion. Symptoms include ear pressure, congestion, nasal blockage, headache and fatigue.  Onset of symptoms was a few days ago, gradually worsening since that time. Patient denies shortness of breath, wheezing, fever.   Evaluation to date: none Treatment to date:  OTC oral decongestants, OTC antihistamines and OTC cough suppressant.       The following portions of the patient's history were reviewed and updated as appropriate: allergies, current medications, past family history, past medical history, past social history, past surgical history and problem list.    Review of Systems   Constitutional: Positive for fatigue. Negative for chills and fever.   HENT: Positive for congestion, sinus pressure and sinus pain. Negative for sore throat.    Respiratory: Negative for cough.    Neurological: Positive for headaches.       Objective   Physical Exam  Vitals and nursing note reviewed.   Constitutional:       Appearance: Normal appearance. He is well-developed.   HENT:      Right Ear: Tympanic membrane, ear canal and external ear normal.      Left Ear: Tympanic membrane, ear canal and external ear normal.      Nose: Mucosal edema present.      Right Sinus: Maxillary sinus tenderness and frontal sinus tenderness present.      Left Sinus: Maxillary sinus tenderness and frontal sinus tenderness present.      Mouth/Throat:      Pharynx: Posterior oropharyngeal erythema present. No pharyngeal swelling, oropharyngeal exudate or uvula swelling.   Cardiovascular:      Rate and Rhythm: Normal rate and regular rhythm.   Pulmonary:      Effort: Pulmonary effort is normal.      Breath sounds: Normal breath sounds.   Neurological:      Mental Status: He is alert and oriented to person, place, and time.   Psychiatric:         Mood and Affect: Mood normal.         Behavior:  Behavior normal.         Thought Content: Thought content normal.         Judgment: Judgment normal.         Assessment/Plan   Problems Addressed this Visit     None      Visit Diagnoses     Acute URI        Relevant Medications    predniSONE (DELTASONE) 20 MG tablet    amoxicillin-clavulanate (Augmentin) 875-125 MG per tablet      Diagnoses       Codes Comments    Acute URI     ICD-10-CM: J06.9  ICD-9-CM: 465.9             Patient will start prednisone first.  If no improvement within the next few days, will start Augmentin.

## 2021-11-24 RX ORDER — DAPAGLIFLOZIN 10 MG/1
TABLET, FILM COATED ORAL
Qty: 90 TABLET | Refills: 0 | Status: SHIPPED | OUTPATIENT
Start: 2021-11-24 | End: 2022-02-22

## 2021-11-24 RX ORDER — SITAGLIPTIN 100 MG/1
TABLET, FILM COATED ORAL
Qty: 90 TABLET | Refills: 0 | Status: SHIPPED | OUTPATIENT
Start: 2021-11-24 | End: 2022-02-22

## 2021-12-01 ENCOUNTER — OFFICE VISIT (OUTPATIENT)
Dept: FAMILY MEDICINE CLINIC | Facility: CLINIC | Age: 46
End: 2021-12-01

## 2021-12-01 VITALS
TEMPERATURE: 97.5 F | BODY MASS INDEX: 44.48 KG/M2 | SYSTOLIC BLOOD PRESSURE: 144 MMHG | DIASTOLIC BLOOD PRESSURE: 98 MMHG | HEIGHT: 65 IN | WEIGHT: 267 LBS | RESPIRATION RATE: 20 BRPM | HEART RATE: 116 BPM

## 2021-12-01 DIAGNOSIS — J01.00 ACUTE NON-RECURRENT MAXILLARY SINUSITIS: Primary | ICD-10-CM

## 2021-12-01 PROCEDURE — 99213 OFFICE O/P EST LOW 20 MIN: CPT | Performed by: FAMILY MEDICINE

## 2021-12-01 RX ORDER — AMOXICILLIN AND CLAVULANATE POTASSIUM 875; 125 MG/1; MG/1
1 TABLET, FILM COATED ORAL EVERY 12 HOURS SCHEDULED
Qty: 20 TABLET | Refills: 0 | Status: SHIPPED | OUTPATIENT
Start: 2021-12-01 | End: 2021-12-14

## 2021-12-01 NOTE — PROGRESS NOTES
Maria Del Rosario Suárez is a 46 y.o. male.     CC: URI    History of Present Illness     Pt comes in today reporting a roughly 5-6 day h/o facial p/p, colored d/c and HAs. No f/c/dyspnea/cough.     The following portions of the patient's history were reviewed and updated as appropriate: allergies, current medications, past family history, past medical history, past social history, past surgical history and problem list.    Review of Systems   Constitutional: Negative for activity change, chills and fever.   HENT: Positive for congestion and sinus pressure.    Respiratory: Negative for cough.    Cardiovascular: Negative for chest pain.   Neurological: Positive for headaches.   Psychiatric/Behavioral: Negative for dysphoric mood.       Objective   Physical Exam  Constitutional:       General: He is not in acute distress.     Appearance: He is well-developed.   HENT:      Right Ear: Tympanic membrane and ear canal normal.      Left Ear: Tympanic membrane and ear canal normal.      Nose:      Right Sinus: Maxillary sinus tenderness present.      Left Sinus: Maxillary sinus tenderness present.   Pulmonary:      Effort: Pulmonary effort is normal.   Neurological:      Mental Status: He is alert and oriented to person, place, and time.   Psychiatric:         Behavior: Behavior normal.         Thought Content: Thought content normal.         Assessment/Plan   Diagnoses and all orders for this visit:    1. Acute non-recurrent maxillary sinusitis (Primary)  -     amoxicillin-clavulanate (Augmentin) 875-125 MG per tablet; Take 1 tablet by mouth Every 12 (Twelve) Hours.  Dispense: 20 tablet; Refill: 0

## 2021-12-13 VITALS — WEIGHT: 220 LBS | HEIGHT: 65 IN | BODY MASS INDEX: 36.65 KG/M2

## 2021-12-14 ENCOUNTER — TELEMEDICINE (OUTPATIENT)
Dept: FAMILY MEDICINE CLINIC | Facility: CLINIC | Age: 46
End: 2021-12-14

## 2021-12-14 DIAGNOSIS — J06.9 ACUTE URI: Primary | ICD-10-CM

## 2021-12-14 PROCEDURE — 99213 OFFICE O/P EST LOW 20 MIN: CPT | Performed by: FAMILY MEDICINE

## 2021-12-14 RX ORDER — CEFDINIR 300 MG/1
300 CAPSULE ORAL 2 TIMES DAILY
Qty: 20 CAPSULE | Refills: 0 | Status: SHIPPED | OUTPATIENT
Start: 2021-12-14 | End: 2021-12-24

## 2021-12-14 RX ORDER — METHYLPREDNISOLONE 4 MG/1
TABLET ORAL
Qty: 21 TABLET | Refills: 0 | Status: SHIPPED | OUTPATIENT
Start: 2021-12-14 | End: 2022-01-13

## 2021-12-14 NOTE — PROGRESS NOTES
Maria Del Rosario Suárez is a 46 y.o. male.     CC: VV for URI-Sx    History of Present Illness     Pt seen today on a VV c/o lots of facial p/p, congestion, RN, no cough/change to smell or taste. Pt had done well when he saw me on 12/1 with Augmentin but, as soon as the med ended, the sx started back.    [place] [facility] [place] gave him Zyrtec and Astelin.    The following portions of the patient's history were reviewed and updated as appropriate: allergies, current medications, past family history, past medical history, past social history, past surgical history and problem list.    Review of Systems   Constitutional: Positive for fatigue. Negative for activity change, chills and fever.   HENT: Positive for congestion and sinus pressure.    Respiratory: Negative for cough.    Cardiovascular: Negative for chest pain.   Psychiatric/Behavioral: Negative for dysphoric mood.       Objective   Physical Exam  Nursing note reviewed.   Constitutional:       General: He is not in acute distress.     Appearance: He is well-developed.   Pulmonary:      Effort: Pulmonary effort is normal.   Neurological:      Mental Status: He is alert and oriented to person, place, and time.   Psychiatric:         Behavior: Behavior normal.         Thought Content: Thought content normal.         Assessment/Plan   Diagnoses and all orders for this visit:    1. Acute URI (Primary)  -     methylPREDNISolone (Medrol) 4 MG dose pack; follow package directions  Dispense: 21 tablet; Refill: 0  -     cefdinir (OMNICEF) 300 MG capsule; Take 1 capsule by mouth 2 (Two) Times a Day for 10 days.  Dispense: 20 capsule; Refill: 0      Spent  15   minutes with chart and interview and consent for this encounter given by the patient.  You have chosen to receive care through a telehealth visit.  Do you consent to use a video/audio connection for your medical care today? Yes

## 2021-12-16 ENCOUNTER — TELEPHONE (OUTPATIENT)
Dept: FAMILY MEDICINE CLINIC | Facility: CLINIC | Age: 46
End: 2021-12-16

## 2021-12-16 NOTE — TELEPHONE ENCOUNTER
Caller: Jamie Suárez    Relationship: Self    Best call back number: [phone]    What form or medical record are you requesting: FMLA     Who is requesting this form or medical record from you: MORIAH     How would you like to receive the form or medical records (pick-up, mail, fax): FAX   If fax, what is the fax number: PATIENT STATES FAX NUMBER WILL BE ON PAPERWORK     Timeframe paperwork needed: ASAP     Additional notes: PATIENT STATES MORIAH FAXED OVER PAPERWORK TO OFFICE 12/15

## 2021-12-20 ENCOUNTER — TELEPHONE (OUTPATIENT)
Dept: FAMILY MEDICINE CLINIC | Facility: CLINIC | Age: 46
End: 2021-12-20

## 2022-01-10 ENCOUNTER — TELEPHONE (OUTPATIENT)
Dept: FAMILY MEDICINE CLINIC | Facility: CLINIC | Age: 47
End: 2022-01-10

## 2022-01-10 NOTE — TELEPHONE ENCOUNTER
Caller: XIANG    Relationship: Other [facility]    Best call back number: [phone]    What is the best time to reach you: 8:30A-5:00P EASTERN TIME    Who are you requesting to speak with (clinical staff, provider,  specific staff member): CLINICAL    What was the call regarding: XIANG WITH [employer] IS WANTING TO KNOW IF THE REASON THE PATIENT IS OFF OF WORK PERTAINING TO HIS PREVIOUS DIAGNOSIS OR A NEW DIAGNOSIS.     PLEASE CALL AND ADVISE.

## 2022-01-12 NOTE — TELEPHONE ENCOUNTER
Called and LVM for Kathryn to give info that Paperwork is for same diagnosis.  Left my CB number and Ext for any questions

## 2022-01-13 ENCOUNTER — TELEMEDICINE (OUTPATIENT)
Dept: FAMILY MEDICINE CLINIC | Facility: CLINIC | Age: 47
End: 2022-01-13

## 2022-01-13 DIAGNOSIS — J06.9 VIRAL URI WITH COUGH: Primary | ICD-10-CM

## 2022-01-13 PROCEDURE — 99213 OFFICE O/P EST LOW 20 MIN: CPT | Performed by: FAMILY MEDICINE

## 2022-01-13 NOTE — PROGRESS NOTES
Mode of Visit: Video  Location of patient: other: car, parked  You have chosen to receive care through a telehealth visit.  Does the patient consent to use a video/audio connection for your medical care today? Yes  The visit included audio and video interaction. No technical issues occurred during this visit.      Subjective       Chief Complaint   Patient presents with   • Generalized Body Aches   • Nausea         HPI:        Jamie is a 46 y.o. male who presents to  [address] today with achiness.  Patient had his 1st booster shot Monday.  He had history of COVID previously was just eligible for sure for shot Monday.  By Tuesday he was having really bad symptoms and today he continues to have symptoms of achiness.  He is having some cough.  No documented fever but ongoing sweats.  We talked about the possibility of this still being related to the COVID vaccination but also possibility of being on chronic breakthrough since he is only partially vaccinated.  Advised him to not go to work.  He will get COVID testing and return to work once that is negative.  He will do the usual care fluids rest Tylenol as needed and vitamin supplementation and aspirin supplementation.            PE:   Objective   There were no vitals filed for this visit.     There is no height or weight on file to calculate BMI.    Physical Exam   Constitutional: He appears well-developed. He appears ill (mild ). No distress. He appears obese.  Eyes: Conjunctivae are normal. No scleral icterus.   Neck: Neck normal appearance.  Pulmonary/Chest: Effort normal.  No respiratory distress. He no audible wheeze...  Neurological: He is alert.   Skin: Skin is dry. He is not diaphoretic.   Psychiatric: He has a normal mood and affect.                             A/P:     Assessment/Plan   Diagnoses and all orders for this visit:    1. Viral URI with cough (Primary)  -     COVID-19,[facility],NP/OP  Swab in [employer] or ESwab 72 HR TAT - Swab, Anterior nasal; Future    Advised off work until Monday.  Information on COVID isolation strategies shared in AVS.      Follow up:   Return if symptoms worsen or fail to improve.  Patient was given instructions and counseling regarding his condition or for health maintenance advice. Please see specific information pulled into the AVS if appropriate.

## 2022-01-13 NOTE — PATIENT INSTRUCTIONS

## 2022-01-14 ENCOUNTER — TELEPHONE (OUTPATIENT)
Dept: FAMILY MEDICINE CLINIC | Facility: CLINIC | Age: 47
End: 2022-01-14

## 2022-01-14 DIAGNOSIS — J06.9 VIRAL URI WITH COUGH: ICD-10-CM

## 2022-01-14 NOTE — TELEPHONE ENCOUNTER
Caller: Jamie Suárez    Relationship: Self    Best call back number: [phone]    Who are you requesting to speak with (clinical staff, provider,  specific staff member): NURSE    What was the call regarding: MORIAH IS STILL NEEDING ADDITIONAL INFORMATION BEFORE THEY WILL PAY THE PATIENT...THEY NEED TO KNOW IF THIS IS THIS ALL RELATED TO HIS PREVIOUS BOUT OF COVID. PLEASE CONTACT [facility] AND LET PATIENT KNOW.    Do you require a callback: YES

## 2022-01-18 LAB — SARS-COV-2 RNA RESP QL NAA+PROBE: NOT DETECTED

## 2022-02-07 ENCOUNTER — OFFICE VISIT (OUTPATIENT)
Dept: ENDOCRINOLOGY | Age: 47
End: 2022-02-07

## 2022-02-07 VITALS
WEIGHT: 271.2 LBS | SYSTOLIC BLOOD PRESSURE: 136 MMHG | HEIGHT: 66 IN | OXYGEN SATURATION: 94 % | DIASTOLIC BLOOD PRESSURE: 72 MMHG | HEART RATE: 113 BPM | BODY MASS INDEX: 43.58 KG/M2

## 2022-02-07 DIAGNOSIS — E11.65 TYPE 2 DIABETES MELLITUS WITH HYPERGLYCEMIA, WITHOUT LONG-TERM CURRENT USE OF INSULIN: Primary | ICD-10-CM

## 2022-02-07 DIAGNOSIS — E66.01 MORBID OBESITY: ICD-10-CM

## 2022-02-07 DIAGNOSIS — E78.5 HYPERLIPIDEMIA ASSOCIATED WITH TYPE 2 DIABETES MELLITUS: ICD-10-CM

## 2022-02-07 DIAGNOSIS — E11.69 HYPERLIPIDEMIA ASSOCIATED WITH TYPE 2 DIABETES MELLITUS: ICD-10-CM

## 2022-02-07 PROCEDURE — 99214 OFFICE O/P EST MOD 30 MIN: CPT | Performed by: INTERNAL MEDICINE

## 2022-02-07 NOTE — PROGRESS NOTES
"Chief Complaint  Chief Complaint   Patient presents with   • Diabetes Mellitus     follow up       Subjective          History of Present Illness    Jamie Suárez 46 y.o. presents with Type 2 dm as a new patient. Consulted by      Type 2 dm - Diagnosed about 1 - 2  years ago.   Today in clinic pt reports being on Januvia 100 mg po daily, metformin 1000 mg po bid, farxiga 10 mg po daily, Glipizide ER - 10 mg po daily in the morning with BF.   FBG - 90- 110  Pre meals - 100 - 120  Checks BG - 1 - 2 times   Sensor - x  Dm retinopathy -x ,Last eye exam - due for the exam  Dm nephropathy - x  Dm neuropathy - x,Dm neuropathy meds - n/a  CAD -x  CVA -x  Episodes of hypoglycemia - x  Pt is physically active. weight has been stable.   Pt tries to follow DM diet for most part.     Obesity - pt is planning bariatric procedure done.     Reviewed primary care physician's/consulting physician documentation and lab results         I have reviewed the patient's allergies, medicines, past medical hx, family hx and social hx in detail.    Objective   Vital Signs:   /72   Pulse 113   Ht 167.6 cm (66\")   Wt 123 kg (271 lb 3.2 oz)   SpO2 94%   BMI 43.77 kg/m²   Physical Exam   General appearance - no distress  Eyes- anicteric sclera  Ear nose and throat-external ears and nose normal.    Respiratory-normal chest on inspection.  No respiratory distress noted.  Skin-no rashes.  Neuro-alert and oriented x3            Result Review :   The following data was reviewed by: Seth Myers MD on 02/07/2022:  Orders Only on 01/14/2022   Component Date Value Ref Range Status   • SARS-CoV-2, MESSI 01/14/2022 Not Detected  Not Detected Final    Comment: This nucleic acid amplification test was developed and its performance  characteristics determined by [employer]. Nucleic acid  amplification tests include RT-PCR and TMA. This test has not been  FDA cleared or approved. This test has been authorized by FDA under  an Emergency " Use Authorization (EUA). This test is only authorized  for the duration of time the declaration that circumstances exist  justifying the authorization of the emergency use of in vitro  diagnostic tests for detection of SARS-CoV-2 virus and/or diagnosis  of COVID-19 infection under section 564(b)(1) of the Act, 21 U.S.C.  360bbb-3(b) (1), unless the authorization is terminated or revoked  sooner.  When diagnostic testing is negative, the possibility of a false  negative result should be considered in the context of a patient's  recent exposures and the presence of clinical signs and symptoms  consistent with COVID-19. An individual without symptoms of COVID-19  and who is not shedding SARS-CoV-2 virus wo                           uld expect to have a  negative (not detected) result in this assay.       Data reviewed: pcp notes       Results Review:    I reviewed the patient's new clinical results.     Assessment and Plan    Problem List Items Addressed This Visit        Other    Morbid obesity (HCC)    Relevant Orders    TSH    T4, Free    T3, Free    Basic Metabolic Panel    Hemoglobin A1c    Lipid Panel    Vitamin D 25 Hydroxy    Vitamin B12 & Folate    TSH    T3, Free    T4, Free    Basic Metabolic Panel    Hemoglobin A1c    Diabetes mellitus (HCC) - Primary    Relevant Orders    TSH    T4, Free    T3, Free    Basic Metabolic Panel    Hemoglobin A1c    Lipid Panel    Vitamin D 25 Hydroxy    Vitamin B12 & Folate    TSH    T3, Free    T4, Free    Basic Metabolic Panel    Hemoglobin A1c      Other Visit Diagnoses     Hyperlipidemia associated with type 2 diabetes mellitus (HCC)        Relevant Orders    TSH    T4, Free    T3, Free    Basic Metabolic Panel    Hemoglobin A1c    Lipid Panel    Vitamin D 25 Hydroxy    Vitamin B12 & Folate    TSH    T3, Free    T4, Free    Basic Metabolic Panel    Hemoglobin A1c        Type 2 dm - uncontrolled with hyperglycemia   Continue the above medications.   Based on BW might  "consider adjusting or stopping glipizide.     Obesity -   Advised the pt to consider the bariatric surgeon referral.   Also explained the need to back off on the Dm meds if pt were to lose weight.       Interpreted the blood work-up/imaging results performed by the primary care/consulting physician -    Refills sent to pharmacy    Follow Up     Patient was given instructions and counseling regarding her condition or for health maintenance advice. Please see specific information pulled into the AVS if appropriate.       Thank you for asking me to see your patient, Jamie Suárez in consultation.         Seth Myers MD  02/07/22      EMR Dragon / transcription disclaimer:     \"Dictated utilizing Dragon dictation\".         "

## 2022-02-08 LAB
BUN SERPL-MCNC: 19 MG/DL (ref 6–24)
BUN/CREAT SERPL: 15 (ref 9–20)
CALCIUM SERPL-MCNC: 9.6 MG/DL (ref 8.7–10.2)
CHLORIDE SERPL-SCNC: 98 MMOL/L (ref 96–106)
CO2 SERPL-SCNC: 25 MMOL/L (ref 20–29)
CREAT SERPL-MCNC: 1.23 MG/DL (ref 0.76–1.27)
GLUCOSE SERPL-MCNC: 190 MG/DL (ref 65–99)
HBA1C MFR BLD: 6.9 % (ref 4.8–5.6)
POTASSIUM SERPL-SCNC: 4.7 MMOL/L (ref 3.5–5.2)
SODIUM SERPL-SCNC: 139 MMOL/L (ref 134–144)
T3FREE SERPL-MCNC: 3.7 PG/ML (ref 2–4.4)
T4 FREE SERPL-MCNC: 1.26 NG/DL (ref 0.82–1.77)
TSH SERPL DL<=0.005 MIU/L-ACNC: 1.53 UIU/ML (ref 0.45–4.5)

## 2022-02-19 DIAGNOSIS — K21.9 GASTROESOPHAGEAL REFLUX DISEASE WITHOUT ESOPHAGITIS: ICD-10-CM

## 2022-02-21 RX ORDER — OMEPRAZOLE 40 MG/1
40 CAPSULE, DELAYED RELEASE ORAL DAILY
Qty: 90 CAPSULE | Refills: 1 | Status: SHIPPED | OUTPATIENT
Start: 2022-02-21 | End: 2022-09-19 | Stop reason: SDUPTHER

## 2022-02-22 RX ORDER — SITAGLIPTIN 100 MG/1
TABLET, FILM COATED ORAL
Qty: 90 TABLET | Refills: 0 | Status: SHIPPED | OUTPATIENT
Start: 2022-02-22 | End: 2022-03-14

## 2022-02-22 RX ORDER — DAPAGLIFLOZIN 10 MG/1
TABLET, FILM COATED ORAL
Qty: 90 TABLET | Refills: 0 | Status: SHIPPED | OUTPATIENT
Start: 2022-02-22 | End: 2022-05-24

## 2022-03-14 RX ORDER — SITAGLIPTIN 100 MG/1
TABLET, FILM COATED ORAL
Qty: 90 TABLET | Refills: 0 | Status: SHIPPED | OUTPATIENT
Start: 2022-03-14 | End: 2022-08-16

## 2022-05-17 NOTE — PROGRESS NOTES
Subjective   Jamie Suárez is a 46 y.o. male.     CC: VV for COVID    History of Present Illness     Pt seen today on a VV after being seen while out of town and being dx with COVID. That ED visit was as follows:    HISTORY OF PRESENT ILLNESS:     46-year-old male admitted to the observation unit due to elevated heart rate and shortness of breath. Patient reports that 2-3 days ago he had shortness of breath and was treated at an urgent care and given a steroid prescription that has since improved his shortness of breath symptoms. He states that he gets symptoms like this typically about once a year but wanted to make sure that there was not something else going on so he presented to the ER yesterday. In the ED his heart rate was noted to be as high as the 140s, other vitals stable. Patient denies any fever, chills, diaphoresis, sore throat, rhinorrhea, cough, congestion, chest pain, nausea, vomiting, headache, dizziness.    He received a DuoNeb breathing treatment in ED that also improved his shortness of breath symptoms, however his heart rate continued to be elevated in the 130s to 140s and his blood sugar was noted to be 421 in the ED (does have hx of DM2), he was given a L of IV fluids in which his heart rate was still in the 130s. He was advised to stay for admission for further evaluation of his persistent tachycardia. No previous history of DVT or PE.     PLAN     Symptoms seem more respiratory related although still having sinus tachycardia. Will place patient on carvedilol to see how he responds. Check echocardiogram. Will discuss further testing such as stress testing with attending although patient regularly partakes in physical activity without issue and again his symptoms seem more respiratory in nature.  Pt's symptoms started around 5/13/22.     COVID-19 Detected (A)     Pt WAS given mAb infusion.  ECHO was WNL.    Current outpatient and discharge medications have been reconciled for the  patient.  Reviewed by: Harrison Broderick MD    Pt reports feeling some better and reports his O2 Sats are 95% and stable. No f/c.     The following portions of the patient's history were reviewed and updated as appropriate: allergies, current medications, past family history, past medical history, past social history, past surgical history and problem list.    Review of Systems   Constitutional: Negative for activity change, chills and fever.   Respiratory: Negative for cough.    Cardiovascular: Negative for chest pain.   Psychiatric/Behavioral: Negative for dysphoric mood.       Objective   Physical Exam  Constitutional:       General: He is not in acute distress.     Appearance: He is well-developed.   Pulmonary:      Effort: Pulmonary effort is normal.   Neurological:      Mental Status: He is alert and oriented to person, place, and time.   Psychiatric:         Behavior: Behavior normal.         Thought Content: Thought content normal.     Hospital records reviewed with pt confirming HPI.      Assessment & Plan   Diagnoses and all orders for this visit:    1. Upper respiratory tract infection due to COVID-19 virus (Primary)    Pt currently in [place] seeing family. Recommended he  Vits D/C/Zinc and Quercetin, along with Pepcid and ASA.    Spent  12   minutes with chart and interview and consent for this encounter given by the patient.  You have chosen to receive care through a telehealth visit.  Do you consent to use a video/audio connection for your medical care today? Yes

## 2022-05-18 ENCOUNTER — TELEMEDICINE (OUTPATIENT)
Dept: FAMILY MEDICINE CLINIC | Facility: CLINIC | Age: 47
End: 2022-05-18

## 2022-05-18 VITALS — HEIGHT: 66 IN | WEIGHT: 270 LBS | BODY MASS INDEX: 43.39 KG/M2

## 2022-05-18 DIAGNOSIS — J06.9 UPPER RESPIRATORY TRACT INFECTION DUE TO COVID-19 VIRUS: Primary | ICD-10-CM

## 2022-05-18 DIAGNOSIS — U07.1 UPPER RESPIRATORY TRACT INFECTION DUE TO COVID-19 VIRUS: Primary | ICD-10-CM

## 2022-05-18 PROCEDURE — 99213 OFFICE O/P EST LOW 20 MIN: CPT | Performed by: FAMILY MEDICINE

## 2022-05-18 RX ORDER — METFORMIN HYDROCHLORIDE 500 MG/1
500 TABLET, FILM COATED, EXTENDED RELEASE ORAL
COMMUNITY
End: 2023-01-06 | Stop reason: SDUPTHER

## 2022-05-18 RX ORDER — PREDNISONE 50 MG/1
TABLET ORAL
COMMUNITY
Start: 2022-05-14 | End: 2022-08-16

## 2022-05-24 RX ORDER — DAPAGLIFLOZIN 10 MG/1
TABLET, FILM COATED ORAL
Qty: 90 TABLET | Refills: 0 | OUTPATIENT
Start: 2022-05-24

## 2022-05-24 RX ORDER — DAPAGLIFLOZIN 10 MG/1
TABLET, FILM COATED ORAL
Qty: 90 TABLET | Refills: 0 | Status: SHIPPED | OUTPATIENT
Start: 2022-05-24 | End: 2022-08-22

## 2022-05-24 NOTE — TELEPHONE ENCOUNTER
Rx Refill Note  Requested Prescriptions     Pending Prescriptions Disp Refills    Farxiga 10 MG tablet [Pharmacy Med Name: FARXIGA 10MG TABLETS] 90 tablet 0     Sig: TAKE 1 TABLET BY MOUTH DAILY    Farxiga 10 MG tablet [Pharmacy Med Name: FARXIGA 10MG TABLETS] 90 tablet 0     Sig: TAKE 1 TABLET BY MOUTH DAILY      Last office visit with prescribing clinician: 5/17/2021      Next office visit with prescribing clinician: 4 month's            Marimar Valenzuela  05/24/22, 09:55 EDT

## 2022-06-29 DIAGNOSIS — E11.65 TYPE 2 DIABETES MELLITUS WITH HYPERGLYCEMIA, WITHOUT LONG-TERM CURRENT USE OF INSULIN: ICD-10-CM

## 2022-07-01 RX ORDER — GLIPIZIDE 10 MG/1
TABLET, FILM COATED, EXTENDED RELEASE ORAL
Qty: 180 TABLET | Refills: 2 | Status: SHIPPED | OUTPATIENT
Start: 2022-07-01 | End: 2023-01-06 | Stop reason: SDUPTHER

## 2022-07-01 NOTE — TELEPHONE ENCOUNTER
Rx Refill Note  Requested Prescriptions     Pending Prescriptions Disp Refills    glipizide (GLUCOTROL XL) 10 MG 24 hr tablet [Pharmacy Med Name: GLIPIZIDE ER 10MG TABLETS] 180 tablet 2     Sig: TAKE 1 TABLET BY MOUTH EVERY DAY      Last office visit with prescribing clinician: 5/17/2021      Next office visit with prescribing clinician: Visit date not found            Marimar Valenzuela  07/01/22, 13:20 EDT

## 2022-07-20 ENCOUNTER — TELEPHONE (OUTPATIENT)
Dept: FAMILY MEDICINE CLINIC | Facility: CLINIC | Age: 47
End: 2022-07-20

## 2022-07-20 NOTE — TELEPHONE ENCOUNTER
Caller: Jamie Suárez    Relationship: Self    Best call back number: [phone]    What was the call regarding: PATIENT WAS EXPOSED TO SOMEONE WITH COVID EARLIER THIS WEEK BUT HAD AN INFUSION TWO MONTHS AGO WHEN HE HAD COVID. PLEASE ADVISE IF PATIENT NEEDS TO TEST OR QUARANTINE OR IF HE SHOULD BE OKAY SINCE HE IS STILL WITHIN HIS 90 DAYS SINCE THE INFUSION.     Do you require a callback: YES

## 2022-08-16 ENCOUNTER — OFFICE VISIT (OUTPATIENT)
Dept: FAMILY MEDICINE CLINIC | Facility: CLINIC | Age: 47
End: 2022-08-16

## 2022-08-16 VITALS
RESPIRATION RATE: 16 BRPM | BODY MASS INDEX: 43.39 KG/M2 | DIASTOLIC BLOOD PRESSURE: 89 MMHG | WEIGHT: 270 LBS | SYSTOLIC BLOOD PRESSURE: 143 MMHG | HEIGHT: 66 IN | HEART RATE: 78 BPM | TEMPERATURE: 97.8 F

## 2022-08-16 DIAGNOSIS — E11.9 TYPE 2 DIABETES MELLITUS WITHOUT COMPLICATION, WITHOUT LONG-TERM CURRENT USE OF INSULIN: ICD-10-CM

## 2022-08-16 DIAGNOSIS — E66.01 MORBID (SEVERE) OBESITY DUE TO EXCESS CALORIES: Primary | ICD-10-CM

## 2022-08-16 PROCEDURE — 99213 OFFICE O/P EST LOW 20 MIN: CPT | Performed by: FAMILY MEDICINE

## 2022-08-16 RX ORDER — IBUPROFEN 600 MG/1
TABLET ORAL
COMMUNITY
Start: 2022-08-15 | End: 2022-08-23 | Stop reason: SDUPTHER

## 2022-08-16 RX ORDER — AMOXICILLIN 500 MG/1
CAPSULE ORAL
COMMUNITY
Start: 2022-08-12 | End: 2022-08-23

## 2022-08-16 RX ORDER — DEXAMETHASONE 6 MG/1
6 TABLET ORAL
COMMUNITY
Start: 2022-08-12 | End: 2022-08-17

## 2022-08-16 RX ORDER — CYCLOBENZAPRINE HCL 10 MG
TABLET ORAL
COMMUNITY
Start: 2022-08-15 | End: 2022-08-23 | Stop reason: SDUPTHER

## 2022-08-16 NOTE — PROGRESS NOTES
"Maria Del Rosario Suárez is a 46 y.o. male.     CC: Management of Obesity    History of Present Illness     Pt comes in today to discuss help for weight loss. Pt has tried numerous things prior w/o help and currently has several comorbidities that being obesity worsens, mainly DM.   Pt sees Endocrine for his DM.    The following portions of the patient's history were reviewed and updated as appropriate: allergies, current medications, past family history, past medical history, past social history, past surgical history and problem list.    Review of Systems   Constitutional: Negative for activity change, chills and fever.   Respiratory: Negative for cough.    Cardiovascular: Negative for chest pain.   Psychiatric/Behavioral: Negative for dysphoric mood.       /89   Pulse 78   Temp 97.8 °F (36.6 °C) (Oral)   Resp 16   Ht 167.6 cm (66\")   Wt 122 kg (270 lb)   BMI 43.58 kg/m²     Objective   Physical Exam  Constitutional:       General: He is not in acute distress.     Appearance: He is well-developed.   Cardiovascular:      Rate and Rhythm: Normal rate and regular rhythm.   Pulmonary:      Effort: Pulmonary effort is normal.      Breath sounds: Normal breath sounds.   Neurological:      Mental Status: He is alert and oriented to person, place, and time.   Psychiatric:         Behavior: Behavior normal.         Thought Content: Thought content normal.     Labs from endocrine reviewed at today's visit.    Assessment & Plan   Diagnoses and all orders for this visit:    1. Morbid (severe) obesity due to excess calories (HCC) (Primary)  -     Semaglutide,0.25 or 0.5MG/DOS, (OZEMPIC) 2 MG/1.5ML solution pen-injector; Inject 0.5 mg under the skin into the appropriate area as directed 1 (One) Time Per Week.  Dispense: 1.5 mL; Refill: 2  -     Insulin Pen Needle 32G X 4 MM misc; Use QD  Dispense: 20 each; Refill: 3    2. Type 2 diabetes mellitus without complication, without long-term current use of insulin " (HCC)  -     Semaglutide,0.25 or 0.5MG/DOS, (OZEMPIC) 2 MG/1.5ML solution pen-injector; Inject 0.5 mg under the skin into the appropriate area as directed 1 (One) Time Per Week.  Dispense: 1.5 mL; Refill: 2  -     Insulin Pen Needle 32G X 4 MM misc; Use QD  Dispense: 20 each; Refill: 3    Nature of options discussed with pt. Pt interested in GLP-1 treatments, so will d/c the Januvia, cautioned him on the Glucotrol, and pt to start at the low 0.25mg dose and increase to 0.5mg in 2 week and keep close f/u with his Endocrinologist.

## 2022-08-19 ENCOUNTER — TELEPHONE (OUTPATIENT)
Dept: FAMILY MEDICINE CLINIC | Facility: CLINIC | Age: 47
End: 2022-08-19

## 2022-08-19 NOTE — TELEPHONE ENCOUNTER
Caller: Jamie Suárez    Relationship: Self    Best call back number: [phone]    What form or medical record are you requesting: PATIENT NEEDS AN EXTENDED LEAVE OF ABSENCE FROM WORK UNTIL Monday. HIS BACK IS STILL IN A LOT OF PAIN AND HE IS SUPPOSED TO GO BACK TO WORK TONIGHT.     Who is requesting this form or medical record from you: EMPLOYER    How would you like to receive the form or medical records (pick-up, mail, fax):     Timeframe paperwork needed: ASAP, PATIENT IS SUPPOSED TO WORK AT 5.     Additional notes: PLEASE CALL ASAP

## 2022-08-19 NOTE — TELEPHONE ENCOUNTER
Informed pt Dr Broderick is out of office today. He said he will go to [facility] and follow up next week.

## 2022-08-22 RX ORDER — DAPAGLIFLOZIN 10 MG/1
TABLET, FILM COATED ORAL
Qty: 90 TABLET | Refills: 0 | Status: SHIPPED | OUTPATIENT
Start: 2022-08-22 | End: 2022-12-16
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
## 2022-08-23 ENCOUNTER — OFFICE VISIT (OUTPATIENT)
Dept: FAMILY MEDICINE CLINIC | Facility: CLINIC | Age: 47
End: 2022-08-23

## 2022-08-23 VITALS
DIASTOLIC BLOOD PRESSURE: 90 MMHG | HEART RATE: 90 BPM | SYSTOLIC BLOOD PRESSURE: 140 MMHG | WEIGHT: 270 LBS | RESPIRATION RATE: 18 BRPM | HEIGHT: 66 IN | TEMPERATURE: 97.9 F | BODY MASS INDEX: 43.39 KG/M2

## 2022-08-23 DIAGNOSIS — S16.1XXD STRAIN OF NECK MUSCLE, SUBSEQUENT ENCOUNTER: Primary | ICD-10-CM

## 2022-08-23 DIAGNOSIS — S39.012D STRAIN OF LUMBAR REGION, SUBSEQUENT ENCOUNTER: ICD-10-CM

## 2022-08-23 DIAGNOSIS — S09.90XD CLOSED HEAD INJURY, SUBSEQUENT ENCOUNTER: ICD-10-CM

## 2022-08-23 DIAGNOSIS — Z09 HOSPITAL DISCHARGE FOLLOW-UP: ICD-10-CM

## 2022-08-23 PROCEDURE — 99212 OFFICE O/P EST SF 10 MIN: CPT | Performed by: FAMILY MEDICINE

## 2022-08-23 RX ORDER — CYCLOBENZAPRINE HCL 10 MG
10 TABLET ORAL 2 TIMES DAILY PRN
Qty: 30 TABLET | Refills: 2 | Status: SHIPPED | OUTPATIENT
Start: 2022-08-23

## 2022-08-23 RX ORDER — IBUPROFEN 600 MG/1
600 TABLET ORAL EVERY 6 HOURS PRN
Qty: 42 TABLET | Refills: 2 | OUTPATIENT
Start: 2022-08-23 | End: 2022-10-05

## 2022-09-19 ENCOUNTER — TELEPHONE (OUTPATIENT)
Dept: FAMILY MEDICINE CLINIC | Facility: CLINIC | Age: 47
End: 2022-09-19

## 2022-09-19 DIAGNOSIS — K21.9 GASTROESOPHAGEAL REFLUX DISEASE WITHOUT ESOPHAGITIS: ICD-10-CM

## 2022-09-19 RX ORDER — OMEPRAZOLE 40 MG/1
40 CAPSULE, DELAYED RELEASE ORAL DAILY
Qty: 90 CAPSULE | Refills: 1 | Status: SHIPPED | OUTPATIENT
Start: 2022-09-19 | End: 2022-11-22 | Stop reason: SDUPTHER

## 2022-11-04 DIAGNOSIS — J45.41 MODERATE PERSISTENT ASTHMA WITH ACUTE EXACERBATION: ICD-10-CM

## 2022-11-04 RX ORDER — ALBUTEROL SULFATE 90 UG/1
AEROSOL, METERED RESPIRATORY (INHALATION)
Qty: 18 G | Refills: 5 | Status: SHIPPED | OUTPATIENT
Start: 2022-11-04

## 2022-11-19 DIAGNOSIS — E66.01 MORBID (SEVERE) OBESITY DUE TO EXCESS CALORIES: ICD-10-CM

## 2022-11-19 DIAGNOSIS — E11.9 TYPE 2 DIABETES MELLITUS WITHOUT COMPLICATION, WITHOUT LONG-TERM CURRENT USE OF INSULIN: ICD-10-CM

## 2022-11-21 RX ORDER — SEMAGLUTIDE 1.34 MG/ML
INJECTION, SOLUTION SUBCUTANEOUS
Qty: 0.5 ML | Refills: 0 | Status: SHIPPED | OUTPATIENT
Start: 2022-11-21 | End: 2022-11-29 | Stop reason: SDUPTHER

## 2022-11-22 DIAGNOSIS — K21.9 GASTROESOPHAGEAL REFLUX DISEASE WITHOUT ESOPHAGITIS: ICD-10-CM

## 2022-11-22 RX ORDER — OMEPRAZOLE 40 MG/1
40 CAPSULE, DELAYED RELEASE ORAL DAILY
Qty: 90 CAPSULE | Refills: 1 | Status: SHIPPED | OUTPATIENT
Start: 2022-11-22 | End: 2023-02-13

## 2022-11-22 RX ORDER — OMEPRAZOLE 40 MG/1
40 CAPSULE, DELAYED RELEASE ORAL DAILY
Qty: 90 CAPSULE | Refills: 1 | OUTPATIENT
Start: 2022-11-22

## 2022-11-29 ENCOUNTER — TELEPHONE (OUTPATIENT)
Dept: FAMILY MEDICINE CLINIC | Facility: CLINIC | Age: 47
End: 2022-11-29

## 2022-11-29 DIAGNOSIS — E11.9 TYPE 2 DIABETES MELLITUS WITHOUT COMPLICATION, WITHOUT LONG-TERM CURRENT USE OF INSULIN: ICD-10-CM

## 2022-11-29 DIAGNOSIS — E66.01 MORBID (SEVERE) OBESITY DUE TO EXCESS CALORIES: ICD-10-CM

## 2022-11-29 RX ORDER — SEMAGLUTIDE 1.34 MG/ML
0.5 INJECTION, SOLUTION SUBCUTANEOUS
Qty: 0.5 ML | Refills: 0 | Status: SHIPPED | OUTPATIENT
Start: 2022-11-29 | End: 2022-12-01 | Stop reason: CLARIF

## 2022-11-29 NOTE — TELEPHONE ENCOUNTER
Caller: Jamie Suárez    Relationship: Self    Best call back number: [phone]    What is the best time to reach you:     Who are you requesting to speak with (clinical staff, provider,  specific staff member): CLINICAL STAFF    What was the call regarding: PATIENT STATES THAT [facility] IS NOT ABLE TO GET HIS Ozempic, 0.25 or 0.5 MG/DOSE, 2 MG/1.5ML solution pen-injector.   HE WOULD LIKE TO KNOW IF PERHAPS A DIFFERENT PHARMACY MIGHT HAVE THE MEDICATION OR WOULD LIKE TO DISCUSS A DIFFERENT MEDICATION.  PLEASE CALL TO DISCUSS.             Do you require a callback: YES      PLEASE ADVISE.

## 2022-12-01 RX ORDER — TIRZEPATIDE 5 MG/.5ML
5 INJECTION, SOLUTION SUBCUTANEOUS WEEKLY
Qty: 2 ML | Refills: 5 | Status: SHIPPED | OUTPATIENT
Start: 2022-12-01 | End: 2023-01-30

## 2022-12-01 RX ORDER — TIRZEPATIDE 5 MG/.5ML
5 INJECTION, SOLUTION SUBCUTANEOUS WEEKLY
Qty: 2 ML | Refills: 5 | Status: SHIPPED | OUTPATIENT
Start: 2022-12-01 | End: 2022-12-01

## 2022-12-16 RX ORDER — DAPAGLIFLOZIN 10 MG/1
TABLET, FILM COATED ORAL
Qty: 30 TABLET | Refills: 0 | Status: SHIPPED | OUTPATIENT
Start: 2022-12-16 | End: 2023-01-06 | Stop reason: SDUPTHER

## 2023-01-06 DIAGNOSIS — E11.65 TYPE 2 DIABETES MELLITUS WITH HYPERGLYCEMIA, WITHOUT LONG-TERM CURRENT USE OF INSULIN: ICD-10-CM

## 2023-01-09 RX ORDER — DAPAGLIFLOZIN 10 MG/1
1 TABLET, FILM COATED ORAL DAILY
Qty: 30 TABLET | Refills: 0 | Status: SHIPPED | OUTPATIENT
Start: 2023-01-09 | End: 2023-02-07 | Stop reason: SDUPTHER

## 2023-01-09 RX ORDER — METFORMIN HYDROCHLORIDE 500 MG/1
1000 TABLET, FILM COATED, EXTENDED RELEASE ORAL 2 TIMES DAILY
Qty: 180 TABLET | Refills: 4 | Status: SHIPPED | OUTPATIENT
Start: 2023-01-09 | End: 2023-02-07 | Stop reason: SDUPTHER

## 2023-01-09 RX ORDER — GLIPIZIDE 10 MG/1
10 TABLET, FILM COATED, EXTENDED RELEASE ORAL DAILY
Qty: 180 TABLET | Refills: 2 | Status: SHIPPED | OUTPATIENT
Start: 2023-01-09 | End: 2023-03-06

## 2023-01-17 ENCOUNTER — TELEPHONE (OUTPATIENT)
Dept: FAMILY MEDICINE CLINIC | Facility: CLINIC | Age: 48
End: 2023-01-17

## 2023-01-17 NOTE — TELEPHONE ENCOUNTER
.    Pharmacy Name:     [facility] - [place], [place] - [address] [address] [facility] - [phone]  - [phone] [place] (Pharmacy) [phone]         What medication are you calling in regards to: Tirzepatide (Mounjaro) 5 MG/0.5ML solution pen-injector    What question does the pharmacy have: PHARMACY WANTED TO KNOW IF PATIENT IS TAKING THIS FOR WEIGHT LOSS OR DIABETES?    Who is the provider that prescribed the medication: DR HART

## 2023-01-28 NOTE — PROGRESS NOTES
Subjective   Jamie Suárez is a 47 y.o. male.     History of Present Illness     Chief Complaint:   Chief Complaint   Patient presents with   • obesity     To discuss increasing medication   / diabetic eye exam due / co lonoscopy due        Jamie Suárez 47 y.o. male who presents today for Medical Management of the below listed issues. He  has a problem list of   Patient Active Problem List   Diagnosis   • Disorder of rotator cuff   • Shoulder pain   • GERD (gastroesophageal reflux disease)   • Cervical stenosis of spinal canal   • Left cervical radiculopathy   • ERRONEOUS ENCOUNTER--DISREGARD   • SANA (obstructive sleep apnea)   • Morbid obesity (HCC)   • Restless legs syndrome (RLS)   • New onset type 2 diabetes mellitus (HCC)   • Acute respiratory failure due to COVID-19 (HCC)   • Polycythemia   • Diabetes mellitus (HCC)   .  Since the last visit, He has overall felt well and is interested in maybe increasing his Mounjaro dose. Pt also sees Endocrine for his DM and has an appt in March, although is overdue for his labs. Pt has been doing well with the med, although had been off for a while due to supply chain issues.   he has been compliant with   Current Outpatient Medications:   •  albuterol sulfate  (90 Base) MCG/ACT inhaler, TAKE 2 PUFFS BY MOUTH EVERY 4 HOURS AS NEEDED FOR WHEEZE, Disp: 18 g, Rfl: 5  •  Blood Glucose Monitoring Suppl (ACCU-CHEK PRIYANKA PLUS) w/Device kit, 1 device, Disp: 1 kit, Rfl: 0  •  cyclobenzaprine (FLEXERIL) 10 MG tablet, Take 1 tablet by mouth 2 (Two) Times a Day As Needed for Muscle Spasms., Disp: 30 tablet, Rfl: 2  •  dapagliflozin Propanediol (Farxiga) 10 MG tablet, Take 10 mg by mouth Daily for 90 days., Disp: 30 tablet, Rfl: 0  •  glipizide (GLUCOTROL XL) 10 MG 24 hr tablet, Take 1 tablet by mouth Daily for 90 days., Disp: 180 tablet, Rfl: 2  •  glucose blood (Accu-Chek Priyanka Plus) test strip, To check 3 - 4 times a day, Disp: 100 each, Rfl: 2  •  Insulin Pen Needle 32G X 4  "MM misc, Use QD, Disp: 20 each, Rfl: 3  •  ipratropium (ATROVENT) 0.06 % nasal spray, 2 sprays into the nostril(s) as directed by provider 4 (Four) Times a Day for 7 days., Disp: 15 mL, Rfl: 0  •  Lancets (ACCU-CHEK MULTICLIX) lancets, To check 3 - 4 times a day, Disp: 100 each, Rfl: 2  •  metFORMIN (GLUMETZA) 500 MG (MOD) 24 hr tablet, Take 2 tablets by mouth 2 (Two) Times a Day for 90 days., Disp: 180 tablet, Rfl: 4  •  methylPREDNISolone (MEDROL) 4 MG dose pack, Take as directed on package instructions., Disp: 21 tablet, Rfl: 0  •  omeprazole (priLOSEC) 40 MG capsule, Take 1 capsule by mouth Daily., Disp: 90 capsule, Rfl: 1  •  Tirzepatide (Mounjaro) 7.5 MG/0.5ML solution pen-injector, Inject 7.5 mg under the skin into the appropriate area as directed 1 (One) Time Per Week., Disp: 2 mL, Rfl: 5.  He denies medication side effects.    All of the other chronic condition(s) listed above are stable w/o issues.    /95   Pulse 90   Temp 98.1 °F (36.7 °C) (Oral)   Resp 18   Ht 167.6 cm (66\")   Wt 122 kg (270 lb)   BMI 43.58 kg/m²     Results for orders placed or performed during the hospital encounter of 10/05/22   POCT VERITOR SARS-CoV-2 Antigen ([id number])    Specimen: Nasopharynx; Swab   Result Value Ref Range    SARS Antigen Not Detected Not Detected, Presumptive Negative    Internal Control Passed Passed    Lot Number [id number]     Expiration Date 10/10/2022              The following portions of the patient's history were reviewed and updated as appropriate: allergies, current medications, past family history, past medical history, past social history, past surgical history, and problem list.    Review of Systems   Constitutional: Negative for activity change, chills and fever.   Respiratory: Negative for cough.    Cardiovascular: Negative for chest pain.   Psychiatric/Behavioral: Negative for dysphoric mood.       Objective   Physical Exam  Constitutional:       General: He is not in acute distress.     " Appearance: He is well-developed.   Cardiovascular:      Rate and Rhythm: Normal rate and regular rhythm.   Pulmonary:      Effort: Pulmonary effort is normal.      Breath sounds: Normal breath sounds.   Neurological:      Mental Status: He is alert and oriented to person, place, and time.   Psychiatric:         Behavior: Behavior normal.         Thought Content: Thought content normal.     Pt handed the lab slip his Endocrine ordered 8/22 and asked to complete.        Diagnoses and all orders for this visit:    1. Morbid (severe) obesity due to excess calories (HCC) (Primary)  -     Tirzepatide (Mounjaro) 7.5 MG/0.5ML solution pen-injector; Inject 7.5 mg under the skin into the appropriate area as directed 1 (One) Time Per Week.  Dispense: 2 mL; Refill: 5    2. Screening for colon cancer  -     Ambulatory Referral to Gastroenterology

## 2023-01-30 ENCOUNTER — OFFICE VISIT (OUTPATIENT)
Dept: FAMILY MEDICINE CLINIC | Facility: CLINIC | Age: 48
End: 2023-01-30
Payer: COMMERCIAL

## 2023-01-30 VITALS
HEIGHT: 66 IN | WEIGHT: 270 LBS | TEMPERATURE: 98.1 F | BODY MASS INDEX: 43.39 KG/M2 | HEART RATE: 90 BPM | RESPIRATION RATE: 18 BRPM | DIASTOLIC BLOOD PRESSURE: 95 MMHG | SYSTOLIC BLOOD PRESSURE: 145 MMHG

## 2023-01-30 DIAGNOSIS — E66.01 MORBID (SEVERE) OBESITY DUE TO EXCESS CALORIES: Primary | ICD-10-CM

## 2023-01-30 DIAGNOSIS — Z12.11 SCREENING FOR COLON CANCER: ICD-10-CM

## 2023-01-30 PROCEDURE — 99213 OFFICE O/P EST LOW 20 MIN: CPT | Performed by: FAMILY MEDICINE

## 2023-01-30 RX ORDER — TIRZEPATIDE 7.5 MG/.5ML
7.5 INJECTION, SOLUTION SUBCUTANEOUS WEEKLY
Qty: 2 ML | Refills: 5 | Status: SHIPPED | OUTPATIENT
Start: 2023-01-30 | End: 2023-03-22 | Stop reason: ALTCHOICE

## 2023-02-01 ENCOUNTER — TREATMENT (OUTPATIENT)
Dept: ENDOCRINOLOGY | Age: 48
End: 2023-02-01
Payer: COMMERCIAL

## 2023-02-01 ENCOUNTER — PRIOR AUTHORIZATION (OUTPATIENT)
Dept: ENDOCRINOLOGY | Age: 48
End: 2023-02-01
Payer: COMMERCIAL

## 2023-02-06 NOTE — PROGRESS NOTES
"Maria Del Rosario Suárez is a 47 y.o. male.     CC: Abdominal Pain/GERD    History of Present Illness     Pt comes in today until having to be seen at the [place] ED on 2/4/23 due to food \"sticking\". That visit was as follows:    47-year-old male presenting with discomfort with swallowing. This been ongoing for the past 2 to 3 days. Is not worsening but not getting better. He states food feels like it \"gets stuck\" whenever he swallows. He only has the discomfort in his chest when he swallows. He is able to keep solids and liquids down, however. Does have gastritis and thinks he takes 40 mg of omeprazole the daily. No nausea or vomiting. Is a diabetic but denies any history of hypertension or hyperlipidemia, diabetes, non-smoker, no known family history of coronary artery disease. He does not have a gastroenterologist. He states today he is only eaten an apple due to the discomfort when he swallows.  W/U was negative and he was asked to see [facility], but they can't see him until August. Pt reports food appears to \"stick\" in the epigastric area. No choking. Mom had a major HH prior, had surgery that was curative.    Pt has had a great response to his Mounjaro and has lost good weight already, and exercising. Pt loving the medication, although having a little nausea (manageble).    Lastly, pt has a h/o SANA and his machine has been broken and thus not treating this for many months.        The following portions of the patient's history were reviewed and updated as appropriate: allergies, current medications, past family history, past medical history, past social history, past surgical history and problem list.    Review of Systems   Constitutional: Negative for activity change, chills and fever.   Respiratory: Negative for cough.    Cardiovascular: Negative for chest pain.   Gastrointestinal: Positive for abdominal pain.   Psychiatric/Behavioral: Negative for dysphoric mood.       /97   Pulse 110   Temp 97.5 " "°F (36.4 °C) (Oral)   Resp 20   Ht 167.6 cm (66\")   Wt 117 kg (257 lb)   BMI 41.48 kg/m²     Objective   Physical Exam  Constitutional:       General: He is not in acute distress.     Appearance: He is well-developed.   Pulmonary:      Effort: Pulmonary effort is normal.   Abdominal:      General: Abdomen is flat.      Palpations: Abdomen is soft.   Neurological:      Mental Status: He is alert and oriented to person, place, and time.   Psychiatric:         Behavior: Behavior normal.         Thought Content: Thought content normal.     Hospital records reviewed with pt confirming HPI.    Assessment & Plan   Diagnoses and all orders for this visit:    1. Other dysphagia (Primary)  -     Ambulatory Referral to Gastroenterology    Pt asked to quickly reach out to his Sleep office as his recent H/H shows elevation, most likely due to not treating his SANA. Pt reports he'll do so immediately.           "

## 2023-02-07 ENCOUNTER — OFFICE VISIT (OUTPATIENT)
Dept: FAMILY MEDICINE CLINIC | Facility: CLINIC | Age: 48
End: 2023-02-07
Payer: COMMERCIAL

## 2023-02-07 VITALS
BODY MASS INDEX: 41.3 KG/M2 | RESPIRATION RATE: 20 BRPM | DIASTOLIC BLOOD PRESSURE: 97 MMHG | HEIGHT: 66 IN | WEIGHT: 257 LBS | HEART RATE: 110 BPM | SYSTOLIC BLOOD PRESSURE: 139 MMHG | TEMPERATURE: 97.5 F

## 2023-02-07 DIAGNOSIS — R13.19 OTHER DYSPHAGIA: ICD-10-CM

## 2023-02-07 DIAGNOSIS — J45.41 MODERATE PERSISTENT ASTHMA WITH ACUTE EXACERBATION: ICD-10-CM

## 2023-02-07 DIAGNOSIS — G47.33 OSA (OBSTRUCTIVE SLEEP APNEA): Primary | ICD-10-CM

## 2023-02-07 PROCEDURE — 99213 OFFICE O/P EST LOW 20 MIN: CPT | Performed by: FAMILY MEDICINE

## 2023-02-07 RX ORDER — METFORMIN HYDROCHLORIDE 500 MG/1
1000 TABLET, FILM COATED, EXTENDED RELEASE ORAL 2 TIMES DAILY
Qty: 120 TABLET | Refills: 0 | Status: SHIPPED | OUTPATIENT
Start: 2023-02-07 | End: 2023-03-06 | Stop reason: SDUPTHER

## 2023-02-07 RX ORDER — DAPAGLIFLOZIN 10 MG/1
1 TABLET, FILM COATED ORAL DAILY
Qty: 30 TABLET | Refills: 0 | Status: SHIPPED | OUTPATIENT
Start: 2023-02-07 | End: 2023-03-06 | Stop reason: SDUPTHER

## 2023-02-07 RX ORDER — SUCRALFATE 1 G/1
1 TABLET ORAL 4 TIMES DAILY
Qty: 40 TABLET | Refills: 0 | Status: CANCELLED | OUTPATIENT
Start: 2023-02-07 | End: 2023-02-17

## 2023-02-09 ENCOUNTER — TELEPHONE (OUTPATIENT)
Dept: GASTROENTEROLOGY | Facility: CLINIC | Age: 48
End: 2023-02-09
Payer: COMMERCIAL

## 2023-02-09 ENCOUNTER — TELEPHONE (OUTPATIENT)
Dept: FAMILY MEDICINE CLINIC | Facility: CLINIC | Age: 48
End: 2023-02-09
Payer: COMMERCIAL

## 2023-02-09 NOTE — TELEPHONE ENCOUNTER
Called to let pt know that his appointment with Sariah Bullock was scheduled wrong and since he has seen dr Riddle in the past I moved his appointment over to him for the same day. His new appointment in on 2/14/23 at 1pm. I was unable to leave a  due to VM being full.

## 2023-02-09 NOTE — TELEPHONE ENCOUNTER
Caller: Jamie Suárez    Relationship: Self    Best call back number: [phone] (Mobile)    What is the best time to reach you: ANYTIME    Who are you requesting to speak with (clinical staff, provider,  specific staff member): CLINICAL STAFF    Do you know the name of the person who called:      What was the call regarding: PATIENT CALLED TO REQUEST A CALLBACK TO DISCUSS WHAT HE SHOULD DO ABOUT THE WAY HE WOKE UP FEELING THIS MORNING- SYMPTOMS- RASPY VOICE, LIGHTHEADED.    Do you require a callback: YES        THANKS

## 2023-02-13 ENCOUNTER — OFFICE VISIT (OUTPATIENT)
Dept: FAMILY MEDICINE CLINIC | Facility: CLINIC | Age: 48
End: 2023-02-13
Payer: COMMERCIAL

## 2023-02-13 VITALS
HEIGHT: 66 IN | WEIGHT: 256 LBS | SYSTOLIC BLOOD PRESSURE: 136 MMHG | BODY MASS INDEX: 41.14 KG/M2 | OXYGEN SATURATION: 96 % | HEART RATE: 106 BPM | DIASTOLIC BLOOD PRESSURE: 82 MMHG | TEMPERATURE: 98.2 F

## 2023-02-13 DIAGNOSIS — R05.1 ACUTE COUGH: ICD-10-CM

## 2023-02-13 DIAGNOSIS — J06.9 VIRAL UPPER RESPIRATORY ILLNESS: Primary | ICD-10-CM

## 2023-02-13 LAB
EXPIRATION DATE: NORMAL
EXPIRATION DATE: NORMAL
FLUAV AG UPPER RESP QL IA.RAPID: NOT DETECTED
FLUBV AG UPPER RESP QL IA.RAPID: NOT DETECTED
INTERNAL CONTROL: NORMAL
INTERNAL CONTROL: NORMAL
Lab: NORMAL
Lab: NORMAL
S PYO AG THROAT QL: NEGATIVE
SARS-COV-2 AG UPPER RESP QL IA.RAPID: NOT DETECTED

## 2023-02-13 PROCEDURE — 87880 STREP A ASSAY W/OPTIC: CPT | Performed by: INTERNAL MEDICINE

## 2023-02-13 PROCEDURE — 87428 SARSCOV & INF VIR A&B AG IA: CPT | Performed by: INTERNAL MEDICINE

## 2023-02-13 PROCEDURE — 99213 OFFICE O/P EST LOW 20 MIN: CPT | Performed by: INTERNAL MEDICINE

## 2023-02-13 RX ORDER — BENZONATATE 200 MG/1
200 CAPSULE ORAL 3 TIMES DAILY PRN
Qty: 42 CAPSULE | Refills: 1 | Status: SHIPPED | OUTPATIENT
Start: 2023-02-13 | End: 2023-02-23

## 2023-02-13 RX ORDER — PANTOPRAZOLE SODIUM 40 MG/1
1 TABLET, DELAYED RELEASE ORAL DAILY
COMMUNITY
Start: 2023-02-06

## 2023-02-13 RX ORDER — BROMPHENIRAMINE MALEATE, PSEUDOEPHEDRINE HYDROCHLORIDE, AND DEXTROMETHORPHAN HYDROBROMIDE 2; 30; 10 MG/5ML; MG/5ML; MG/5ML
5 SYRUP ORAL 3 TIMES DAILY PRN
Qty: 118 ML | Refills: 0 | Status: SHIPPED | OUTPATIENT
Start: 2023-02-13 | End: 2023-02-23

## 2023-02-13 NOTE — PROGRESS NOTES
"Chief Complaint  Sore Throat and Cough    Maria Del Rosario Ayala presents to [facility] for a sick visit. He normally sees Dr. Broderick.     He first noticed that he was sick this Friday.  He went to urgent care on 9th, was told he had laryngitis and was given cough medication and steroids..  He almost finished with steroids.  His throat remains dry and itchy, still coughing with some phlegm production.  He has some vocal hoarseness which is also improving. No fevers or chills. Overall feeling better. Cough medicine not really helping. No HA, shortness of breath.       Unrelated, he got bruise from Mounjaro injection on left side of abdomen.     Objective   Vital Signs:   Vitals:    02/13/23 1333   BP: 136/82   Pulse: 106   Temp: 98.2 °F (36.8 °C)   SpO2: 96%   Weight: 116 kg (256 lb)   Height: 167.6 cm (65.98\")                Physical Exam  Constitutional:       General: He is not in acute distress.     Appearance: Normal appearance. He is obese. He is not toxic-appearing.   HENT:      Head: Normocephalic and atraumatic.      Nose: Congestion present.      Mouth/Throat:      Mouth: Mucous membranes are moist.      Pharynx: Posterior oropharyngeal erythema present. No oropharyngeal exudate.   Eyes:      General: No scleral icterus.     Conjunctiva/sclera: Conjunctivae normal.   Cardiovascular:      Rate and Rhythm: Normal rate and regular rhythm.      Heart sounds: Normal heart sounds. No murmur heard.    No friction rub. No gallop.   Pulmonary:      Effort: Pulmonary effort is normal. No respiratory distress.      Breath sounds: Normal breath sounds. No stridor. No wheezing, rhonchi or rales.   Musculoskeletal:         General: No swelling, tenderness or deformity. Normal range of motion.      Cervical back: Normal range of motion and neck supple.   Skin:     General: Skin is warm and dry.      Findings: No lesion or rash.      Comments: Mild bruising without induration or tenderness " left lower abdominal subcutaneous tissue   Neurological:      General: No focal deficit present.      Mental Status: He is alert and oriented to person, place, and time.   Psychiatric:         Mood and Affect: Mood normal.         Behavior: Behavior normal.         Judgment: Judgment normal.          Result Review :     The following data was reviewed by: Marcia Leggett MD on 02/13/2023:     with Loreta Meng PA-C (02/09/2023)         Assessment and Plan    Diagnoses and all orders for this visit:    1. Viral upper respiratory illness (Primary)  Assessment & Plan:  Patient overall is improving, with lingering cough without any shortness of breath, fevers to suggest pneumonia, asthma exacerbation etc.  I explained to him that postviral cough can last for many weeks and that unfortunately treatment is symptomatic.  He states that Bromfed really helped him in the past.  I told him that we had to be very careful with that medication as it can affect his blood pressure.  I recommended that he use Tessalon Perles as first-line therapy for cough and use Bromfed sparingly.  Recommended hydration as well.  He is to call us and let us know if he is getting any worse, and go to the emergency department for any dyspnea.    COVID testing negative    Orders:  -     POCT SARS-CoV-2 Antigen TROY + Flu  -     POCT rapid strep A    2. Acute cough  -     POCT SARS-CoV-2 Antigen TROY + Flu  -     POCT rapid strep A    Other orders  -     benzonatate (TESSALON) 200 MG capsule; Take 1 capsule by mouth 3 (Three) Times a Day As Needed for Cough.  Dispense: 42 capsule; Refill: 1  -     brompheniramine-pseudoephedrine-DM 30-2-10 MG/5ML syrup; Take 5 mL by mouth 3 (Three) Times a Day As Needed for Congestion, Cough or Allergies.  Dispense: 118 mL; Refill: 0    Likely hit a for superficial blood vessel with Mounjaro injection, told the patient to return to care if worsening bruising, pain, induration.  Recommended rotation of  injection site.      Follow Up   Return if symptoms worsen or fail to improve.  Patient was given instructions and counseling regarding his condition or for health maintenance advice. Please see specific information pulled into the AVS if appropriate.

## 2023-02-13 NOTE — PATIENT INSTRUCTIONS
This cough may linger for a few weeks. Stay hydrated and take as needed cough medicine. Call if fevers, worsening cough, or overall feeling worse. Go to ER if shortness of breath.     Take the Tessalon perles before the Bromfed as it can affect your blood pressure.

## 2023-02-13 NOTE — ASSESSMENT & PLAN NOTE
Patient overall is improving, with lingering cough without any shortness of breath, fevers to suggest pneumonia, asthma exacerbation etc.  I explained to him that postviral cough can last for many weeks and that unfortunately treatment is symptomatic.  He states that Bromfed really helped him in the past.  I told him that we had to be very careful with that medication as it can affect his blood pressure.  I recommended that he use Tessalon Perles as first-line therapy for cough and use Bromfed sparingly.  Recommended hydration as well.  He is to call us and let us know if he is getting any worse, and go to the emergency department for any dyspnea.    COVID testing negative

## 2023-02-14 ENCOUNTER — OFFICE VISIT (OUTPATIENT)
Dept: GASTROENTEROLOGY | Facility: CLINIC | Age: 48
End: 2023-02-14
Payer: COMMERCIAL

## 2023-02-14 VITALS
WEIGHT: 259.4 LBS | SYSTOLIC BLOOD PRESSURE: 159 MMHG | HEART RATE: 114 BPM | HEIGHT: 66 IN | DIASTOLIC BLOOD PRESSURE: 108 MMHG | BODY MASS INDEX: 41.69 KG/M2 | TEMPERATURE: 97.2 F

## 2023-02-14 DIAGNOSIS — R13.10 DYSPHAGIA, UNSPECIFIED TYPE: Primary | ICD-10-CM

## 2023-02-14 DIAGNOSIS — Z12.11 ENCOUNTER FOR SCREENING FOR MALIGNANT NEOPLASM OF COLON: ICD-10-CM

## 2023-02-14 PROCEDURE — 99204 OFFICE O/P NEW MOD 45 MIN: CPT | Performed by: INTERNAL MEDICINE

## 2023-02-14 RX ORDER — SODIUM CHLORIDE, SODIUM LACTATE, POTASSIUM CHLORIDE, CALCIUM CHLORIDE 600; 310; 30; 20 MG/100ML; MG/100ML; MG/100ML; MG/100ML
30 INJECTION, SOLUTION INTRAVENOUS CONTINUOUS
Status: CANCELLED | OUTPATIENT
Start: 2023-04-20

## 2023-02-14 NOTE — PROGRESS NOTES
Chief Complaint   Patient presents with   • Difficulty Swallowing   • Heartburn     Subjective   HPI  Jamie Suárez is a 47 y.o. male who presents today for new patient evaluation    C/o sensation of food sometimes sitting in upper abdomen.  Intermittent over last year or so.  Recently changed from omeprazole to protonix with some improvement.    Weight stable.      CT chest from 2021 reviewed, small HH  Prior EGD 2019 noted below    ENDOSCOPY (01/21/2019)  -esophagits  -HH  -gastritis    No Correia's on path    Objective   Vitals:    02/14/23 1259   BP: (!) 159/108   Pulse: 114   Temp: 97.2 °F (36.2 °C)     Physical Exam  Vitals reviewed.   Constitutional:       Appearance: He is well-developed.   HENT:      Head: Normocephalic and atraumatic.   Neurological:      Mental Status: He is alert and oriented to person, place, and time.   Psychiatric:         Behavior: Behavior normal.         Thought Content: Thought content normal.         Judgment: Judgment normal.              Assessment & Plan   Assessment:     1. Dysphagia, unspecified type    2. Encounter for screening for malignant neoplasm of colon      Plan:   Schedule EGD/colonosocpy  Continue current dose protonix          Lew Riddle M.D.  [facility]  [address] [place] [address]  Office: [phone]

## 2023-02-22 ENCOUNTER — TELEPHONE (OUTPATIENT)
Dept: FAMILY MEDICINE CLINIC | Facility: CLINIC | Age: 48
End: 2023-02-22

## 2023-02-22 NOTE — TELEPHONE ENCOUNTER
Caller: Jamie Suárez    Relationship: Self    Best call back number: [phone]     What is the best time to reach you: ANY    Who are you requesting to speak with (clinical staff, provider,  specific staff member): DR. HART OR MA    What was the call regarding: COUGH MEDICATION THAT WAS SENT IN FOR HIM IS NOT WORKING.    Do you require a callback: YES

## 2023-02-23 ENCOUNTER — TELEPHONE (OUTPATIENT)
Dept: FAMILY MEDICINE CLINIC | Facility: CLINIC | Age: 48
End: 2023-02-23

## 2023-02-23 ENCOUNTER — OFFICE VISIT (OUTPATIENT)
Dept: FAMILY MEDICINE CLINIC | Facility: CLINIC | Age: 48
End: 2023-02-23
Payer: COMMERCIAL

## 2023-02-23 VITALS
TEMPERATURE: 97.5 F | RESPIRATION RATE: 16 BRPM | OXYGEN SATURATION: 96 % | SYSTOLIC BLOOD PRESSURE: 121 MMHG | WEIGHT: 254 LBS | BODY MASS INDEX: 40.82 KG/M2 | HEART RATE: 103 BPM | DIASTOLIC BLOOD PRESSURE: 80 MMHG | HEIGHT: 66 IN

## 2023-02-23 DIAGNOSIS — R05.1 ACUTE COUGH: Primary | ICD-10-CM

## 2023-02-23 DIAGNOSIS — R05.1 ACUTE COUGH: ICD-10-CM

## 2023-02-23 PROBLEM — R13.10 DYSPHAGIA: Status: ACTIVE | Noted: 2023-02-04

## 2023-02-23 PROBLEM — R06.02 SOB (SHORTNESS OF BREATH): Status: ACTIVE | Noted: 2022-05-15

## 2023-02-23 PROCEDURE — 99213 OFFICE O/P EST LOW 20 MIN: CPT | Performed by: NURSE PRACTITIONER

## 2023-02-23 RX ORDER — PROMETHAZINE HYDROCHLORIDE AND CODEINE PHOSPHATE 6.25; 1 MG/5ML; MG/5ML
5 SOLUTION ORAL NIGHTLY PRN
Qty: 118 ML | Refills: 0 | Status: SHIPPED | OUTPATIENT
Start: 2023-02-23 | End: 2023-02-23 | Stop reason: SDUPTHER

## 2023-02-23 RX ORDER — PROMETHAZINE HYDROCHLORIDE AND CODEINE PHOSPHATE 6.25; 1 MG/5ML; MG/5ML
5 SOLUTION ORAL NIGHTLY PRN
Qty: 118 ML | Refills: 0 | Status: SHIPPED | OUTPATIENT
Start: 2023-02-23 | End: 2023-02-24 | Stop reason: SDUPTHER

## 2023-02-23 NOTE — TELEPHONE ENCOUNTER
Caller: Jamie Suárez     Relationship: [unfilled]     Best call back number: [phone]    What is your medical concern? PATIENT IS TRYING TO GET COUGH SYRUP WITH CODEINE IN IT. [facility] DOES NOT CARRY THIS ANYMORE. WOULD LIKE TO KNOW IF IT CAN BE SUBMITTED TO [id number] - [place], [place] - [address] AT [facility] - [phone] [facility] [phone] [place]     OR     [facility] - [address] [phone] [facility] [phone] [place]

## 2023-02-23 NOTE — PROGRESS NOTES
Maria Del Rosario Suárez is a 47 y.o. male.     History of Present Illness   Patient presents to office to f/u on cough. He states symptoms have improved and he feels well but still has cough at night that is keeping him awake.  He denies shortness of breath or wheezing.  He has minimal cough during the day.  Cough medication given has not helped at night   The following portions of the patient's history were reviewed and updated as appropriate: allergies, current medications, past family history, past medical history, past social history, past surgical history and problem list.    Review of Systems   Constitutional: Negative for fatigue.   Respiratory: Positive for cough. Negative for shortness of breath.    Cardiovascular: Negative for chest pain and palpitations.   Skin: Negative for rash.   Psychiatric/Behavioral: Negative for dysphoric mood and sleep disturbance. The patient is not nervous/anxious.        Objective   Physical Exam  Vitals and nursing note reviewed.   Constitutional:       Appearance: He is well-developed.   Cardiovascular:      Rate and Rhythm: Normal rate and regular rhythm.   Pulmonary:      Effort: Pulmonary effort is normal.      Breath sounds: Normal breath sounds.   Neurological:      Mental Status: He is alert and oriented to person, place, and time.   Psychiatric:         Mood and Affect: Mood normal.         Behavior: Behavior normal.         Thought Content: Thought content normal.         Judgment: Judgment normal.         Assessment & Plan   Diagnoses and all orders for this visit:    1. Acute cough (Primary)  -     promethazine-codeine (PHENERGAN with CODEINE) 6.25-10 MG/5ML solution; Take 5 mL by mouth At Night As Needed for Cough.  Dispense: 118 mL; Refill: 0          KELBY reviewed.

## 2023-02-24 ENCOUNTER — TELEPHONE (OUTPATIENT)
Dept: FAMILY MEDICINE CLINIC | Facility: CLINIC | Age: 48
End: 2023-02-24
Payer: COMMERCIAL

## 2023-02-24 RX ORDER — PROMETHAZINE HYDROCHLORIDE AND CODEINE PHOSPHATE 6.25; 1 MG/5ML; MG/5ML
5 SOLUTION ORAL NIGHTLY PRN
Qty: 118 ML | Refills: 0 | Status: SHIPPED | OUTPATIENT
Start: 2023-02-24 | End: 2023-03-22 | Stop reason: SDUPTHER

## 2023-02-24 NOTE — TELEPHONE ENCOUNTER
PATIENT WAS INFORMED THEY ARE OUT OF STOCK AT [facility], BUT [facility] HAS IT IN STOCK.     PLEASE ADVISE.

## 2023-03-06 ENCOUNTER — OFFICE VISIT (OUTPATIENT)
Dept: ENDOCRINOLOGY | Age: 48
End: 2023-03-06
Payer: COMMERCIAL

## 2023-03-06 VITALS
HEIGHT: 66 IN | SYSTOLIC BLOOD PRESSURE: 130 MMHG | TEMPERATURE: 97.3 F | WEIGHT: 259.4 LBS | BODY MASS INDEX: 41.69 KG/M2 | DIASTOLIC BLOOD PRESSURE: 90 MMHG | HEART RATE: 115 BPM | OXYGEN SATURATION: 97 %

## 2023-03-06 DIAGNOSIS — E11.65 TYPE 2 DIABETES MELLITUS WITH HYPERGLYCEMIA, WITHOUT LONG-TERM CURRENT USE OF INSULIN: Primary | ICD-10-CM

## 2023-03-06 DIAGNOSIS — E66.01 CLASS 3 SEVERE OBESITY DUE TO EXCESS CALORIES WITH SERIOUS COMORBIDITY AND BODY MASS INDEX (BMI) OF 40.0 TO 44.9 IN ADULT: ICD-10-CM

## 2023-03-06 DIAGNOSIS — E11.69 HYPERLIPIDEMIA ASSOCIATED WITH TYPE 2 DIABETES MELLITUS: ICD-10-CM

## 2023-03-06 DIAGNOSIS — E78.5 HYPERLIPIDEMIA ASSOCIATED WITH TYPE 2 DIABETES MELLITUS: ICD-10-CM

## 2023-03-06 PROCEDURE — 99214 OFFICE O/P EST MOD 30 MIN: CPT | Performed by: NURSE PRACTITIONER

## 2023-03-06 RX ORDER — DAPAGLIFLOZIN 10 MG/1
1 TABLET, FILM COATED ORAL DAILY
Qty: 90 TABLET | Refills: 1 | Status: SHIPPED | OUTPATIENT
Start: 2023-03-06 | End: 2023-06-04

## 2023-03-06 RX ORDER — METFORMIN HYDROCHLORIDE 500 MG/1
1000 TABLET, EXTENDED RELEASE ORAL 2 TIMES DAILY
Qty: 360 TABLET | Refills: 1 | Status: SHIPPED | OUTPATIENT
Start: 2023-03-06 | End: 2023-06-04

## 2023-03-06 NOTE — PROGRESS NOTES
"Chief Complaint  Diabetes (Type 2: Pt doesn't have meter, hasn't been checking bs regularly, is up to date on eye exam, no hx of retinopathy or neuropathy. Pt states that he is needing some refills. )    Maria Del Rosario Suárez presents to [facility] ENDOCRINOLOGY  History of Present Illness     LOV 2/2022    Lab Results   Component Value Date    HGBA1C 6.9 (H) 02/07/2022       Type 2 dm    Diagnosed about 1 - 2  years ago.   Today in clinic pt reports being on  Mounjaro 5-7.5 mg weekly depending on what is in stock, metformin at 1000 mg twice daily, farxiga 10mg daily   Last eye exam - due for diabetic exam  Denies diabetic complications   Losing weight on mounjaro, did not have surgery       Objective   Vital Signs:  /90   Pulse 115   Temp 97.3 °F (36.3 °C) (Temporal)   Ht 167.6 cm (65.98\")   Wt 118 kg (259 lb 6.4 oz)   SpO2 97%   BMI 41.89 kg/m²   Estimated body mass index is 41.89 kg/m² as calculated from the following:    Height as of this encounter: 167.6 cm (65.98\").    Weight as of this encounter: 118 kg (259 lb 6.4 oz).             Physical Exam  Vitals reviewed.   Constitutional:       General: He is not in acute distress.  HENT:      Head: Normocephalic and atraumatic.   Eyes:      General:         Right eye: No discharge.         Left eye: No discharge.   Pulmonary:      Effort: Pulmonary effort is normal. No respiratory distress.   Musculoskeletal:         General: No signs of injury. Normal range of motion.      Cervical back: Normal range of motion and neck supple.   Skin:     General: Skin is warm and dry.   Neurological:      Mental Status: He is alert and oriented to person, place, and time. Mental status is at baseline.   Psychiatric:         Mood and Affect: Mood normal.         Behavior: Behavior normal.         Thought Content: Thought content normal.         Judgment: Judgment normal.        Result Review :  The following data was reviewed by: Carrie" KAYLA Henao on 03/06/2023:  Common labs    Common Labs 5/15/22   Glucose 421 (A)   BUN 25 (A)   Creatinine 1.28   eGFR Non  Am >60   eGFR African Am >60   Sodium 134   Potassium 5.0   Chloride 102   Calcium 9.0   (A) Abnormal value       Comments are available for some flowsheets but are not being displayed.                        Assessment and Plan   Diagnoses and all orders for this visit:    1. Type 2 diabetes mellitus with hyperglycemia, without long-term current use of insulin ([facility]) (Primary)  -     TSH  -     T4, Free  -     T3, Free  -     Hemoglobin A1c  -     Comprehensive Metabolic Panel  -     Lipid Panel  -     Microalbumin / Creatinine Urine Ratio - Urine, Clean Catch    2. Hyperlipidemia associated with type 2 diabetes mellitus ([facility])  -     TSH  -     T4, Free  -     T3, Free  -     Hemoglobin A1c  -     Comprehensive Metabolic Panel  -     Lipid Panel  -     Microalbumin / Creatinine Urine Ratio - Urine, Clean Catch    3. Class 3 severe obesity due to excess calories with serious comorbidity and body mass index (BMI) of 40.0 to 44.9 in adult ([facility])    Other orders  -     dapagliflozin Propanediol (Farxiga) 10 MG tablet; Take 10 mg by mouth Daily for 90 days.  Dispense: 90 tablet; Refill: 1  -     metFORMIN ER (GLUCOPHAGE-XR) 500 MG 24 hr tablet; Take 2 tablets by mouth 2 (Two) Times a Day for 90 days.  Dispense: 360 tablet; Refill: 1             Follow Up   Return in about 6 months (around 9/6/2023).     Labs today  Recommend diabetic eye exam  Continue Mounjaro with highest dose available, continue metformin and Farxiga  Continue with weight loss efforts  Consider statin and ACE for preventative measures    Patient was given instructions and counseling regarding his condition or for health maintenance advice. Please see specific information pulled into the AVS if appropriate.     KAYLA Cifuentes

## 2023-03-07 LAB
ALBUMIN SERPL-MCNC: 4.2 G/DL (ref 3.5–5.2)
ALBUMIN/CREAT UR: 1594 MG/G CREAT (ref 0–29)
ALBUMIN/GLOB SERPL: 1.4 G/DL
ALP SERPL-CCNC: 65 U/L (ref 39–117)
ALT SERPL-CCNC: 40 U/L (ref 1–41)
AST SERPL-CCNC: 26 U/L (ref 1–40)
BILIRUB SERPL-MCNC: 0.3 MG/DL (ref 0–1.2)
BUN SERPL-MCNC: 19 MG/DL (ref 6–20)
BUN/CREAT SERPL: 16.5 (ref 7–25)
CALCIUM SERPL-MCNC: 9.5 MG/DL (ref 8.6–10.5)
CHLORIDE SERPL-SCNC: 105 MMOL/L (ref 98–107)
CHOLEST SERPL-MCNC: 185 MG/DL (ref 0–200)
CO2 SERPL-SCNC: 22 MMOL/L (ref 22–29)
CREAT SERPL-MCNC: 1.15 MG/DL (ref 0.76–1.27)
CREAT UR-MCNC: 54.8 MG/DL
EGFRCR SERPLBLD CKD-EPI 2021: 78.5 ML/MIN/1.73
GLOBULIN SER CALC-MCNC: 3 GM/DL
GLUCOSE SERPL-MCNC: 133 MG/DL (ref 65–99)
HBA1C MFR BLD: 6.9 % (ref 4.8–5.6)
HDLC SERPL-MCNC: 42 MG/DL (ref 40–60)
IMP & REVIEW OF LAB RESULTS: NORMAL
LDLC SERPL CALC-MCNC: 95 MG/DL (ref 0–100)
MICROALBUMIN UR-MCNC: 873.7 UG/ML
POTASSIUM SERPL-SCNC: 4.2 MMOL/L (ref 3.5–5.2)
PROT SERPL-MCNC: 7.2 G/DL (ref 6–8.5)
SODIUM SERPL-SCNC: 137 MMOL/L (ref 136–145)
T3FREE SERPL-MCNC: 3.2 PG/ML (ref 2–4.4)
T4 FREE SERPL-MCNC: 1.14 NG/DL (ref 0.93–1.7)
TRIGL SERPL-MCNC: 283 MG/DL (ref 0–150)
TSH SERPL DL<=0.005 MIU/L-ACNC: 1.07 UIU/ML (ref 0.27–4.2)
VLDLC SERPL CALC-MCNC: 48 MG/DL (ref 5–40)

## 2023-03-16 ENCOUNTER — TELEPHONE (OUTPATIENT)
Dept: FAMILY MEDICINE CLINIC | Facility: CLINIC | Age: 48
End: 2023-03-16

## 2023-03-17 ENCOUNTER — TELEPHONE (OUTPATIENT)
Dept: FAMILY MEDICINE CLINIC | Facility: CLINIC | Age: 48
End: 2023-03-17
Payer: COMMERCIAL

## 2023-03-17 NOTE — TELEPHONE ENCOUNTER
Caller: [facility] - [place], [place] - [address] [address] - [phone]  - [phone] FX    Relationship: Pharmacy    Best call back number: [phone]    What is the best time to reach you:ANYTIME   Who are you requesting to speak with (clinical staff, provider,  specific staff member):CLINICAL STAFF    Do you know the name of the person who called:SIMRAN     What was the call regarding: SIMRAN WITH [facility] CALLED AND STATED SHE NEEDS THE DIAGNOSIS CODE FOR  MOUNJARO INJECTION. PLEASE CALL  Do you require a callback YES

## 2023-03-20 ENCOUNTER — PRIOR AUTHORIZATION (OUTPATIENT)
Dept: FAMILY MEDICINE CLINIC | Facility: CLINIC | Age: 48
End: 2023-03-20
Payer: COMMERCIAL

## 2023-03-20 DIAGNOSIS — R05.1 ACUTE COUGH: ICD-10-CM

## 2023-03-20 DIAGNOSIS — E66.01 MORBID (SEVERE) OBESITY DUE TO EXCESS CALORIES: ICD-10-CM

## 2023-03-20 RX ORDER — TIRZEPATIDE 7.5 MG/.5ML
7.5 INJECTION, SOLUTION SUBCUTANEOUS WEEKLY
Qty: 2 ML | Refills: 5 | OUTPATIENT
Start: 2023-03-20

## 2023-03-20 RX ORDER — PROMETHAZINE HYDROCHLORIDE AND CODEINE PHOSPHATE 6.25; 1 MG/5ML; MG/5ML
5 SOLUTION ORAL NIGHTLY PRN
Qty: 118 ML | Refills: 0 | OUTPATIENT
Start: 2023-03-20

## 2023-03-20 NOTE — TELEPHONE ENCOUNTER
Merry saw him 1 month ago. If he's still coughing (needing cough syrup), he'll need to be seen/evaluated.

## 2023-03-20 NOTE — TELEPHONE ENCOUNTER
This Patient called about medication refilled     (Mounjaro) 7.5 MG/0.5ML solution pen-injector     (PHENERGAN with CODEINE) 6.25-10 MG/5ML solution         Needs a refilled on both medication. Out of his injection medication   cough medication also

## 2023-03-22 DIAGNOSIS — R05.1 ACUTE COUGH: ICD-10-CM

## 2023-03-22 DIAGNOSIS — E66.01 MORBID (SEVERE) OBESITY DUE TO EXCESS CALORIES: ICD-10-CM

## 2023-03-22 RX ORDER — PROMETHAZINE HYDROCHLORIDE AND CODEINE PHOSPHATE 6.25; 1 MG/5ML; MG/5ML
5 SOLUTION ORAL NIGHTLY PRN
Qty: 118 ML | Refills: 0 | Status: SHIPPED | OUTPATIENT
Start: 2023-03-22

## 2023-03-22 RX ORDER — TIRZEPATIDE 5 MG/.5ML
5 INJECTION, SOLUTION SUBCUTANEOUS WEEKLY
Qty: 20 ML | Refills: 5 | Status: SHIPPED | OUTPATIENT
Start: 2023-03-22

## 2023-04-07 ENCOUNTER — TELEPHONE (OUTPATIENT)
Dept: GASTROENTEROLOGY | Facility: CLINIC | Age: 48
End: 2023-04-07
Payer: COMMERCIAL

## 2023-04-20 ENCOUNTER — ANESTHESIA (OUTPATIENT)
Dept: GASTROENTEROLOGY | Facility: HOSPITAL | Age: 48
End: 2023-04-20
Payer: COMMERCIAL

## 2023-04-20 ENCOUNTER — ANESTHESIA EVENT (OUTPATIENT)
Dept: GASTROENTEROLOGY | Facility: HOSPITAL | Age: 48
End: 2023-04-20
Payer: COMMERCIAL

## 2023-04-20 ENCOUNTER — HOSPITAL ENCOUNTER (OUTPATIENT)
Facility: HOSPITAL | Age: 48
Setting detail: HOSPITAL OUTPATIENT SURGERY
Discharge: HOME | End: 2023-04-20
Attending: INTERNAL MEDICINE | Admitting: INTERNAL MEDICINE
Payer: COMMERCIAL

## 2023-04-20 VITALS
HEIGHT: 66 IN | RESPIRATION RATE: 16 BRPM | WEIGHT: 247 LBS | HEART RATE: 106 BPM | DIASTOLIC BLOOD PRESSURE: 95 MMHG | OXYGEN SATURATION: 97 % | SYSTOLIC BLOOD PRESSURE: 121 MMHG | TEMPERATURE: 97.8 F | BODY MASS INDEX: 39.7 KG/M2

## 2023-04-20 DIAGNOSIS — Z12.11 ENCOUNTER FOR SCREENING FOR MALIGNANT NEOPLASM OF COLON: ICD-10-CM

## 2023-04-20 DIAGNOSIS — R13.10 DYSPHAGIA, UNSPECIFIED TYPE: ICD-10-CM

## 2023-04-20 LAB — GLUCOSE BLDC GLUCOMTR-MCNC: 126 MG/DL (ref 70–130)

## 2023-04-20 PROCEDURE — 45385 COLONOSCOPY W/LESION REMOVAL: CPT | Performed by: INTERNAL MEDICINE

## 2023-04-20 PROCEDURE — 25010000002 PROPOFOL 10 MG/ML EMULSION: Performed by: ANESTHESIOLOGY

## 2023-04-20 PROCEDURE — 43239 EGD BIOPSY SINGLE/MULTIPLE: CPT | Performed by: INTERNAL MEDICINE

## 2023-04-20 PROCEDURE — 82962 GLUCOSE BLOOD TEST: CPT

## 2023-04-20 PROCEDURE — 88342 IMHCHEM/IMCYTCHM 1ST ANTB: CPT | Performed by: INTERNAL MEDICINE

## 2023-04-20 PROCEDURE — 88305 TISSUE EXAM BY PATHOLOGIST: CPT | Performed by: INTERNAL MEDICINE

## 2023-04-20 PROCEDURE — 88341 IMHCHEM/IMCYTCHM EA ADD ANTB: CPT | Performed by: INTERNAL MEDICINE

## 2023-04-20 RX ORDER — PROPOFOL 10 MG/ML
VIAL (ML) INTRAVENOUS CONTINUOUS PRN
Status: DISCONTINUED | OUTPATIENT
Start: 2023-04-20 | End: 2023-04-20 | Stop reason: SURG

## 2023-04-20 RX ORDER — LIDOCAINE HYDROCHLORIDE 20 MG/ML
INJECTION, SOLUTION INFILTRATION; PERINEURAL AS NEEDED
Status: DISCONTINUED | OUTPATIENT
Start: 2023-04-20 | End: 2023-04-20 | Stop reason: SURG

## 2023-04-20 RX ORDER — SODIUM CHLORIDE 0.9 % (FLUSH) 0.9 %
10 SYRINGE (ML) INJECTION EVERY 12 HOURS SCHEDULED
Status: DISCONTINUED | OUTPATIENT
Start: 2023-04-20 | End: 2023-04-20 | Stop reason: HOSPADM

## 2023-04-20 RX ORDER — SODIUM CHLORIDE 0.9 % (FLUSH) 0.9 %
10 SYRINGE (ML) INJECTION AS NEEDED
Status: DISCONTINUED | OUTPATIENT
Start: 2023-04-20 | End: 2023-04-20 | Stop reason: HOSPADM

## 2023-04-20 RX ORDER — SODIUM CHLORIDE, SODIUM LACTATE, POTASSIUM CHLORIDE, CALCIUM CHLORIDE 600; 310; 30; 20 MG/100ML; MG/100ML; MG/100ML; MG/100ML
INJECTION, SOLUTION INTRAVENOUS CONTINUOUS PRN
Status: DISCONTINUED | OUTPATIENT
Start: 2023-04-20 | End: 2023-04-20 | Stop reason: SURG

## 2023-04-20 RX ORDER — SODIUM CHLORIDE, SODIUM LACTATE, POTASSIUM CHLORIDE, CALCIUM CHLORIDE 600; 310; 30; 20 MG/100ML; MG/100ML; MG/100ML; MG/100ML
30 INJECTION, SOLUTION INTRAVENOUS CONTINUOUS PRN
Status: DISCONTINUED | OUTPATIENT
Start: 2023-04-20 | End: 2023-04-20 | Stop reason: HOSPADM

## 2023-04-20 RX ORDER — PROPOFOL 10 MG/ML
VIAL (ML) INTRAVENOUS AS NEEDED
Status: DISCONTINUED | OUTPATIENT
Start: 2023-04-20 | End: 2023-04-20 | Stop reason: SURG

## 2023-04-20 RX ORDER — SODIUM CHLORIDE 9 MG/ML
40 INJECTION, SOLUTION INTRAVENOUS AS NEEDED
Status: DISCONTINUED | OUTPATIENT
Start: 2023-04-20 | End: 2023-04-20 | Stop reason: HOSPADM

## 2023-04-20 RX ADMIN — LIDOCAINE HYDROCHLORIDE 60 MG: 20 INJECTION, SOLUTION INFILTRATION; PERINEURAL at 09:49

## 2023-04-20 RX ADMIN — SODIUM CHLORIDE, POTASSIUM CHLORIDE, SODIUM LACTATE AND CALCIUM CHLORIDE: 600; 310; 30; 20 INJECTION, SOLUTION INTRAVENOUS at 09:49

## 2023-04-20 RX ADMIN — PROPOFOL 150 MG: 10 INJECTION, EMULSION INTRAVENOUS at 09:51

## 2023-04-20 RX ADMIN — Medication 250 MCG/KG/MIN: at 09:51

## 2023-04-20 RX ADMIN — SODIUM CHLORIDE, POTASSIUM CHLORIDE, SODIUM LACTATE AND CALCIUM CHLORIDE 30 ML/HR: 600; 310; 30; 20 INJECTION, SOLUTION INTRAVENOUS at 09:44

## 2023-04-20 NOTE — H&P
[facility]  Pre Procedure History & Physical    Chief Complaint:   GERD  Screening    Subjective     HPI:   47 y.o. male here for EGD/colonoscopy for hx of GERD and for colon cancer screening    Past Medical History:   Past Medical History:   Diagnosis Date   • Bronchitis    • COVID-19 03/2021   • Diabetes mellitus    • GERD (gastroesophageal reflux disease)     Reflux   • Hiatal hernia    • Nausea and vomiting 01/16/2019    Added automatically from request for surgery [id number]   • Obesity        Past Surgical History:  Past Surgical History:   Procedure Laterality Date   • APPENDECTOMY     • ENDOSCOPY N/A 1/21/2019    Procedure: ESOPHAGOGASTRODUODENOSCOPY with biopsy;  Surgeon: Lew Riddle MD;  Location: [facility] ENDOSCOPY;  Service: Gastroenterology   • HERNIA REPAIR         Family History:  Family History   Problem Relation Age of Onset   • Heart disease Mother    • Hypertension Mother    • Diabetes Father    • Malig Hyperthermia Neg Hx        Social History:   reports that he has never smoked. He has never used smokeless tobacco. He reports that he does not drink alcohol and does not use drugs.    Medications:   Medications Prior to Admission   Medication Sig Dispense Refill Last Dose   • albuterol sulfate  (90 Base) MCG/ACT inhaler TAKE 2 PUFFS BY MOUTH EVERY 4 HOURS AS NEEDED FOR WHEEZE 18 g 5    • Blood Glucose Monitoring Suppl (ACCU-CHEK PRIYANKA PLUS) w/Device kit 1 device 1 kit 0    • cyclobenzaprine (FLEXERIL) 10 MG tablet Take 1 tablet by mouth 2 (Two) Times a Day As Needed for Muscle Spasms. 30 tablet 2    • dapagliflozin Propanediol (Farxiga) 10 MG tablet Take 10 mg by mouth Daily for 90 days. 90 tablet 1    • glucose blood (Accu-Chek Priyanka Plus) test strip To check 3 - 4 times a day 100 each 2    • Insulin Pen Needle 32G X 4 MM misc Use QD 20 each 3    • Lancets (ACCU-CHEK MULTICLIX) lancets To check 3 - 4 times a day 100 each 2    • metFORMIN ER (GLUCOPHAGE-XR)  "500 MG 24 hr tablet Take 2 tablets by mouth 2 (Two) Times a Day for 90 days. 360 tablet 1    • methylPREDNISolone (MEDROL) 4 MG dose pack Take as directed on package instructions. 21 tablet 0    • pantoprazole (PROTONIX) 40 MG EC tablet Take 1 tablet by mouth Daily.      • promethazine-codeine (PHENERGAN with CODEINE) 6.25-10 MG/5ML solution Take 5 mL by mouth At Night As Needed for Cough. 118 mL 0    • Tirzepatide (Mounjaro) 5 MG/0.5ML solution pen-injector Inject 0.5 mL under the skin into the appropriate area as directed 1 (One) Time Per Week. 20 mL 5        Allergies:  Morphine and related    ROS:    Pertinent items are noted in HPI     Objective     Height 167.6 cm (66\"), weight 112 kg (247 lb).    Physical Exam   Constitutional: Pt is oriented to person, place, and time and well-developed, well-nourished, and in no distress.   Mouth/Throat: Oropharynx is clear and moist.   Neck: Normal range of motion.   Cardiovascular: Normal rate, regular rhythm and normal heart sounds.    Pulmonary/Chest: Effort normal and breath sounds normal.   Abdominal: Soft. Nontender  Skin: Skin is warm and dry.   Psychiatric: Mood, memory, affect and judgment normal.     Assessment & Plan     Diagnosis:  GERD  Screening    Anticipated Surgical Procedure:  EGD  Colonoscopy    The risks, benefits, and alternatives of this procedure have been discussed with the patient or the responsible party- the patient understands and agrees to proceed.                                                          "

## 2023-04-20 NOTE — ANESTHESIA POSTPROCEDURE EVALUATION
"Patient: Jamie Suárez    Procedure Summary     Date: 04/20/23 Room / [facility] /  [facility]    Anesthesia Start: 0949 Anesthesia Stop: 1024    Procedures:       ESOPHAGOGASTRODUODENOSCOPY WITH BIOPSIES (Esophagus)      COLONOSCOPY TO CECUM WITH COLD SNARE POLYPECTOMIES Diagnosis:       Dysphagia, unspecified type      Encounter for screening for malignant neoplasm of colon      (Dysphagia, unspecified type [R13.10])      (Encounter for screening for malignant neoplasm of colon [Z12.11])    Surgeons: Lew Riddle MD Provider: Shannon Woody MD    Anesthesia Type: MAC ASA Status: 3          Anesthesia Type: MAC    Vitals  Vitals Value Taken Time   /71 04/20/23 1022   Temp     Pulse 111 04/20/23 1022   Resp 18 04/20/23 1022   SpO2 96 % 04/20/23 1022           Post Anesthesia Care and Evaluation    Patient location during evaluation: bedside  Patient participation: complete - patient participated  Level of consciousness: awake  Pain management: adequate    Airway patency: patent  Anesthetic complications: No anesthetic complications    Cardiovascular status: acceptable  Respiratory status: acceptable  Hydration status: acceptable    Comments: /71 (BP Location: Left arm, Patient Position: Lying)   Pulse 111   Temp 36.6 °C (97.8 °F) (Oral)   Resp 18   Ht 167.6 cm (66\")   Wt 112 kg (247 lb)   SpO2 96%   BMI 39.87 kg/m²       "

## 2023-04-20 NOTE — ANESTHESIA PREPROCEDURE EVALUATION
Anesthesia Evaluation     NPO Solid Status: > 8 hours  NPO Liquid Status: > 2 hours           Airway   Mallampati: III  TM distance: >3 FB  Neck ROM: full  Large neck circumference and Difficult intubation highly probable  Dental      Pulmonary    (+) shortness of breath, sleep apnea on CPAP,   (-) not a smoker  Cardiovascular   Exercise tolerance: good (4-7 METS)    Rhythm: regular  Rate: normal    (-) angina      Neuro/Psych  (+) numbness,    GI/Hepatic/Renal/Endo    (+) morbid obesity, hiatal hernia, GERD,  diabetes mellitus type 2,     Musculoskeletal     (+) neck stiffness, radiculopathy  Abdominal     Abdomen: soft.  Bowel sounds: normal.   Substance History      OB/GYN          Other                      Anesthesia Plan    ASA 3     MAC     intravenous induction     Anesthetic plan, risks, benefits, and alternatives have been provided, discussed and informed consent has been obtained with: patient.        CODE STATUS:

## 2023-04-20 NOTE — DISCHARGE INSTRUCTIONS
For the next 24 hours patient needs to be with a responsible adult.    For 24 hours DO NOT drive, operate machinery, appliances, drink alcohol, make important decisions or sign legal documents.    Start with a light or bland diet if you are feeling sick to your stomach otherwise advance to regular diet as tolerated.    Follow recommendations on procedure report if provided by your doctor.    Call Dr Riddle for problems [phone]    Problems may include but not limited to: large amounts of bleeding, trouble breathing, repeated vomiting, severe unrelieved pain, fever or chills.

## 2023-04-24 LAB
LAB AP CASE REPORT: NORMAL
LAB AP DIAGNOSIS COMMENT: NORMAL
LAB AP INTRADEPARTMENTAL CONSULT: NORMAL
LAB AP SPECIAL STAINS: NORMAL
PATH REPORT.FINAL DX SPEC: NORMAL
PATH REPORT.GROSS SPEC: NORMAL

## 2023-05-11 DIAGNOSIS — R05.1 ACUTE COUGH: ICD-10-CM

## 2023-05-12 RX ORDER — PROMETHAZINE HYDROCHLORIDE AND CODEINE PHOSPHATE 6.25; 1 MG/5ML; MG/5ML
SOLUTION ORAL
Qty: 118 ML | Refills: 0 | OUTPATIENT
Start: 2023-05-12

## 2023-05-15 DIAGNOSIS — K21.9 GASTROESOPHAGEAL REFLUX DISEASE WITHOUT ESOPHAGITIS: ICD-10-CM

## 2023-05-15 RX ORDER — OMEPRAZOLE 40 MG/1
40 CAPSULE, DELAYED RELEASE ORAL DAILY
Qty: 90 CAPSULE | Refills: 1 | OUTPATIENT
Start: 2023-05-15

## 2023-06-07 ENCOUNTER — TELEPHONE (OUTPATIENT)
Dept: FAMILY MEDICINE CLINIC | Facility: CLINIC | Age: 48
End: 2023-06-07

## 2023-06-07 NOTE — TELEPHONE ENCOUNTER
Caller: [facility] - [facility] [address] - [phone]  - [phone] FX    Relationship: Pharmacy    Best call back number: [phone]    Requested Prescriptions: COMPOUNDED PAIN CREAM WITH DICLOFENAC/CLONIDINE/GABAPENTIN/LIDOCAINE/PRILOCAINE    Pharmacy where request should be sent: [facility] - [facility] [address] - [phone] PH - [phone] FX     Last office visit with prescribing clinician: 2/7/2023   Last telemedicine visit with prescribing clinician: Visit date not found   Next office visit with prescribing clinician: 11/7/2023     Additional details provided by patient: THE PATIENT WAS PRESCRIBED THIS MEDICATION A COUPLE OF YEARS AGO BY DR. CONNELL    Does the patient have less than a 3 day supply:  [x] Yes  [] No    Would you like a call back once the refill request has been completed: [x] Yes [] No    If the office needs to give you a call back, can they leave a voicemail: [x] Yes [] No    Evaristo Perla   06/07/23 09:13 EDT

## 2023-06-14 ENCOUNTER — TELEPHONE (OUTPATIENT)
Dept: GASTROENTEROLOGY | Facility: CLINIC | Age: 48
End: 2023-06-14
Payer: COMMERCIAL

## 2023-06-14 NOTE — TELEPHONE ENCOUNTER
----- Message from Lew Riddle MD sent at 5/8/2023 12:24 PM EDT -----  Benign EGD bx  Colon polyp was adenoma recall 7 years  Office f/u in 6-8 weeks

## 2023-06-15 DIAGNOSIS — L50.9 HIVES: ICD-10-CM

## 2023-06-15 DIAGNOSIS — L29.9 ITCHING: ICD-10-CM

## 2023-06-15 DIAGNOSIS — J30.2 SEASONAL ALLERGIES: Primary | ICD-10-CM

## 2023-06-15 RX ORDER — CETIRIZINE HYDROCHLORIDE 10 MG/1
10 TABLET ORAL DAILY
Qty: 90 TABLET | Refills: 1 | Status: SHIPPED | OUTPATIENT
Start: 2023-06-15

## 2023-07-10 PROBLEM — I10 PRIMARY HYPERTENSION: Status: ACTIVE | Noted: 2023-07-10

## 2023-07-11 PROBLEM — E78.00 PURE HYPERCHOLESTEROLEMIA: Status: ACTIVE | Noted: 2023-07-11

## 2023-07-18 PROBLEM — E66.812 CLASS 2 SEVERE OBESITY DUE TO EXCESS CALORIES WITH SERIOUS COMORBIDITY AND BODY MASS INDEX (BMI) OF 35.0 TO 35.9 IN ADULT: Status: ACTIVE | Noted: 2019-06-25

## 2023-07-27 ENCOUNTER — TELEPHONE (OUTPATIENT)
Dept: FAMILY MEDICINE CLINIC | Facility: CLINIC | Age: 48
End: 2023-07-27
Payer: COMMERCIAL

## 2023-08-01 ENCOUNTER — TELEPHONE (OUTPATIENT)
Dept: FAMILY MEDICINE CLINIC | Facility: CLINIC | Age: 48
End: 2023-08-01

## 2023-08-01 NOTE — TELEPHONE ENCOUNTER
Caller: ZAIRE    Relationship: Other    Best call back number: [phone] EXTENSION [phone]    What is the best time to reach you: ANY TIME    Who are you requesting to speak with (clinical staff, provider,  specific staff member): CLINICAL STAFF    What was the call regarding: ERIKIA WITH [employer] CALLED REGARDING PATIENT'S DISABILITY PAPERWORK.  SHE REQUESTS A CALLBACK TO DISCUSS OFF WORK DATES AND APPOINTMENT INFORMATION.    Is it okay if the provider responds through [employer]:     PLEASE ADVISE.

## 2023-08-04 ENCOUNTER — TELEPHONE (OUTPATIENT)
Dept: FAMILY MEDICINE CLINIC | Facility: CLINIC | Age: 48
End: 2023-08-04
Payer: COMMERCIAL

## 2023-08-04 NOTE — TELEPHONE ENCOUNTER
A form for patient was just faxed over to you from [facility] and just needs it signed and filled out and faxed back over to them please -  patient just stopped in to tell you. [phone] he said to call him if you have any questions.

## 2023-08-08 ENCOUNTER — TELEPHONE (OUTPATIENT)
Dept: FAMILY MEDICINE CLINIC | Facility: CLINIC | Age: 48
End: 2023-08-08
Payer: COMMERCIAL

## 2023-08-08 NOTE — TELEPHONE ENCOUNTER
Caller: MORIAH    Best call back number: [phone] (CLINICAL LINE OR EXT [phone] TO SPEAK WITH MS. BARTON)    Who are you requesting to speak with (clinical staff, provider,  specific staff member): CLINICAL STAFF    What was the call regarding: MORIAH NEEDS CLINICAL INFORMATION AND STATUS OF FORM COMPLETION ON PATIENT'S DISABILITY PAPERWORK

## 2023-08-08 NOTE — TELEPHONE ENCOUNTER
Caller: Jamie Suárez    Relationship: Self    Best call back number: [phone]      What is the best time to reach you: ANYTIME    Who are you requesting to speak with (clinical staff, provider,  specific staff member): [employer] BAKER    What was the call regarding: PATIENT IS REQUESTING A CALLBACK FROM [employer] REGARDING HIS FMLA PAPERWORK. HE STATED THAT [facility] CALLED HIM AND SAID THEY HAD NOT RECEIVED THE CORRECT PAPERWORK AND IF IT WAS NOT RECEIVED BY TODAY 8/8/23 THEY WOULD DENY HIM.

## 2023-08-10 ENCOUNTER — TELEPHONE (OUTPATIENT)
Dept: PEDIATRICS | Facility: OTHER | Age: 48
End: 2023-08-10

## 2023-08-10 NOTE — TELEPHONE ENCOUNTER
LM     Pt was to return on 8/7/23. Last OV 7/19/23. Called still having some drainage and redness

## 2023-08-10 NOTE — TELEPHONE ENCOUNTER
Caller: DEBORAH    Relationship: [facility] DISABILITY    Best call back number: [phone], EXT. [phone]; WITH THIS EXTENSION, WILL REACH JANINE THE PATIENTS CARE COORDINATOR.     What was the call regarding: DEBORAH WITH [facility] STATES THEY HAVE QUESTIONS REGARDING THE PATIENTS DISABILITY PAPER WORK. HE STATES THE PATIENTS' TIME OFF WAS EXTENDED TO AUGUST, BUT HAS NOT BEEN SEEN SINCE 07/19/23 AND THEY HAVE QUESTIONS REGARDING THAT.       PLEASE ADVISE

## 2023-08-11 DIAGNOSIS — E66.01 MORBID (SEVERE) OBESITY DUE TO EXCESS CALORIES: ICD-10-CM

## 2023-08-14 ENCOUNTER — TELEPHONE (OUTPATIENT)
Dept: FAMILY MEDICINE CLINIC | Facility: CLINIC | Age: 48
End: 2023-08-14
Payer: COMMERCIAL

## 2023-08-14 RX ORDER — TIRZEPATIDE 7.5 MG/.5ML
INJECTION, SOLUTION SUBCUTANEOUS
Qty: 2 ML | Refills: 5 | Status: SHIPPED | OUTPATIENT
Start: 2023-08-14

## 2023-08-15 NOTE — TELEPHONE ENCOUNTER
Caller: Jamie Suárez    Relationship: Self    Best call back number: [phone]    Who are you requesting to speak with (clinical staff, provider,  specific staff member): AMPARO BAKER    What was the call regarding: PATIENT WOULD LIKE TO SPEAK WITH AMPARO REGARDING ADDITIONAL PAPERWORK NEEDED FOR [facility].    PLEASE CALL ASAP.  
I have talked with pt. He states that [facility] had question. I call [employer].     
Please review  
RANCHO Pabon about the dates.     
UNABLE TO WARM TRANSFER    JANINE WITH [employer] IS WANTING TO SPEAK TO [employer] AGAIN REGARDING DISABILITY PAPERWORK    [phone]  X [phone]    THEY ARE NEEDING DATES OF PHONE TREATMENT AND TREATMENT PLAN  
home

## 2023-08-18 NOTE — PROGRESS NOTES
Maria Del Rosario Suárez is a 47 y.o. male.     CC: ED F/U for Chest Pain    History of Present Illness     Pt returns today after recent hospitalization. That visit was as follows:    HPI    Chest pain. Left-sided sharp pain. 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 It resolved almost completely with nitroglycerin.    No pain with breathing. No pain or swelling in extremities. No history of clots. No history of dissection or blood vessel issues. No abdominal pain. No back pain. Was diaphoretic during the episodes. Feels well now. No nausea vomiting or diarrhea. No fevers or chills. No cough. No back pain or abdominal pain. No headaches or neck pain. Modifying factors, as above. Associated symptoms, as above.     At 19:09:  I discussed case with the physician on-call for admissions for their primary care physician Harrison Broderick MD Dr. NICS on call. .  I reviewed the history, exam, results and treatments as outlined in this provider note. We discussed any concerns and agreed upon the need for the patients admission and they will admit and provide further treatment orders.    Diagnosis:  ICD-10-CM ICD-9-CM  1. Chest pain, rule out acute myocardial infarction R07.9 786.50    Current outpatient and discharge medications have been reconciled for the patient.  Reviewed by: Harrison Broderick MD    Patient had full cardiac work-up there, including a nuclear stress test, which was all normal.  This was independently reviewed by me at today's visit.    Ancillary, patient has a long history of an umbilical hernia, but he does report it is slowly getting bigger and is somewhat tender now.    The following portions of the patient's history were reviewed and updated as appropriate: allergies, current medications, past family history, past medical history, past social history, past surgical history, and problem list.    Review of Systems   Constitutional:  Negative for activity change, chills and fever.   Respiratory:  Negative for  "cough.    Cardiovascular:  Negative for chest pain.   Psychiatric/Behavioral:  Negative for dysphoric mood.      /80   Pulse 102   Temp 97.9 øF (36.6 øC) (Oral)   Resp 20   Ht 167.6 cm (66\")   Wt 106 kg (234 lb)   SpO2 99%   BMI 37.77 kg/mý     Objective   Physical Exam  Constitutional:       General: He is not in acute distress.     Appearance: He is well-developed.   Cardiovascular:      Rate and Rhythm: Normal rate and regular rhythm.   Pulmonary:      Effort: Pulmonary effort is normal.      Breath sounds: Normal breath sounds.   Neurological:      Mental Status: He is alert and oriented to person, place, and time.   Psychiatric:         Behavior: Behavior normal.         Thought Content: Thought content normal.   Hospital records reviewed with pt confirming HPI.      Assessment & Plan   Diagnoses and all orders for this visit:    1. Chest pain, unspecified type (Primary)    2. Hospital discharge follow-up    3. Umbilical hernia without obstruction and without gangrene  -     Ambulatory Referral to General Surgery    Pt heading back to work tomorrow, off from 8/10 until tomorrow.  Patient has an appointment scheduled next week with gastroenterology to look for other reasons for his chest pain.           "

## 2023-08-21 ENCOUNTER — OFFICE VISIT (OUTPATIENT)
Dept: FAMILY MEDICINE CLINIC | Facility: CLINIC | Age: 48
End: 2023-08-21
Payer: COMMERCIAL

## 2023-08-21 VITALS
TEMPERATURE: 97.9 F | SYSTOLIC BLOOD PRESSURE: 127 MMHG | RESPIRATION RATE: 20 BRPM | DIASTOLIC BLOOD PRESSURE: 80 MMHG | WEIGHT: 234 LBS | HEIGHT: 66 IN | BODY MASS INDEX: 37.61 KG/M2 | OXYGEN SATURATION: 99 % | HEART RATE: 102 BPM

## 2023-08-21 DIAGNOSIS — Z09 HOSPITAL DISCHARGE FOLLOW-UP: ICD-10-CM

## 2023-08-21 DIAGNOSIS — K42.9 UMBILICAL HERNIA WITHOUT OBSTRUCTION AND WITHOUT GANGRENE: ICD-10-CM

## 2023-08-21 DIAGNOSIS — R07.9 CHEST PAIN, UNSPECIFIED TYPE: Primary | ICD-10-CM

## 2023-08-21 PROCEDURE — 99213 OFFICE O/P EST LOW 20 MIN: CPT | Performed by: FAMILY MEDICINE

## 2023-08-21 NOTE — LETTER
August 22, 2023     Patient: Jamie Suárez   YOB: 1975   Date of Visit: 8/21/2023       To Whom It May Concern:    It is my medical opinion that Jamie Suárez may return to work on 8/22/23 .           Sincerely,        Harrison Broderick MD

## 2023-08-21 NOTE — LETTER
August 21, 2023     Patient: Jamie Suárez   YOB: 1975   Date of Visit: 8/21/2023       To Whom It May Concern:    It is my medical opinion that Jamie Suárez may return to work tomorrow, April 22nd.         Sincerely,        Harrison Broderick MD

## 2023-08-30 ENCOUNTER — OFFICE VISIT (OUTPATIENT)
Dept: SURGERY | Facility: CLINIC | Age: 48
End: 2023-08-30
Payer: COMMERCIAL

## 2023-08-30 VITALS
WEIGHT: 238 LBS | HEIGHT: 66 IN | SYSTOLIC BLOOD PRESSURE: 128 MMHG | DIASTOLIC BLOOD PRESSURE: 84 MMHG | BODY MASS INDEX: 38.25 KG/M2

## 2023-08-30 DIAGNOSIS — K42.9 UMBILICAL HERNIA WITHOUT OBSTRUCTION AND WITHOUT GANGRENE: Primary | ICD-10-CM

## 2023-08-30 PROCEDURE — 99203 OFFICE O/P NEW LOW 30 MIN: CPT | Performed by: SURGERY

## 2023-08-30 RX ORDER — SODIUM CHLORIDE 9 MG/ML
40 INJECTION, SOLUTION INTRAVENOUS AS NEEDED
OUTPATIENT
Start: 2023-09-29

## 2023-08-30 RX ORDER — CEFAZOLIN SODIUM 2 G/100ML
2000 INJECTION, SOLUTION INTRAVENOUS ONCE
OUTPATIENT
Start: 2023-09-29 | End: 2023-08-30

## 2023-08-30 RX ORDER — SODIUM CHLORIDE 0.9 % (FLUSH) 0.9 %
10 SYRINGE (ML) INJECTION EVERY 12 HOURS SCHEDULED
OUTPATIENT
Start: 2023-09-29

## 2023-08-30 RX ORDER — SODIUM CHLORIDE 0.9 % (FLUSH) 0.9 %
10 SYRINGE (ML) INJECTION AS NEEDED
OUTPATIENT
Start: 2023-09-29

## 2023-08-30 NOTE — PROGRESS NOTES
Cc: Umbilical hernia    History of presenting illness:   This is a nice 47-year-old obese gentleman with an umbilical hernia present for greater than a year.  Over the last couple of months its become larger.  With direct pressure he has a fair amount of discomfort associated with this.  There is no radiation of pain.  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    Past Medical History: Diabetes, obesity, hypertension, acid reflux, hypercholesterolemia    Past Surgical History: Significant for appendectomy done around 20 years ago laparoscopically.  He had what sounds like bilateral open inguinal hernia repairs also done around 20 years ago.  Colonoscopy with polypectomy done April 2023 along with upper endoscopy.    Medications: Includes Mounjaro, omeprazole, Crestor, insulin, Farxiga, cetirizine    Allergies: Morphine caused a rash    Social History: He is a non-smoker.  His job does require some physical activity.    Family History: Negative for known colon or rectal cancer    Review of Systems:  Constitutional: Denies fever, chills or change in weight  Neck: no swollen glands or dysphagia or odynophagia  Respiratory: negative for SOB, cough, hemoptysis or wheezing  Cardiovascular: negative for chest pain, palpitations or peripheral edema  Gastrointestinal: Positive for periumbilical pain, reflux, denies vomiting      Physical Exam:  BMI: 38.4  General: alert and oriented, appropriate, no acute distress  Eyes: No scleral icterus, extraocular movements are intact  Neck: Supple without lymphadenopathy or thyromegaly, trachea is in the midline  Respiratory: There is good bilateral chest expansion, no use of accessory muscles is noted  Cardiovascular: No jugular venous distention or peripheral edema is seen  Gastrointestinal: Obese but soft.  There is an incarcerated umbilical hernia which I am not able to reduce, containing omentum.  There is mild  tenderness.  There is no overlying skin change.  I am not able to detect the size of the fascial defect.    Laboratory data: Most recent hemoglobin A1c is 6.0    Imaging data: No recent data, but CT of the abdomen and pelvis from 2019 does demonstrate an umbilical hernia containing only omental fat, at that time the fascial defect is estimated at about 1.5 cm.      Assessment and plan:   -Incarcerated umbilical hernia  -Type 2 diabetes and obesity complicating above  -Options discussed with patient.  I have recommended proceeding with open umbilical hernia repair, likely with mesh placement.  -He understands that he will need to take at least 4 weeks, perhaps up to 6 weeks off after surgery in order to allow this to heal given the physical nature of his work  -Risks including bleeding, infection, recurrence and pain discussed, patient is agreeable to proceed.      Aaron Correa MD, FACS  General, Minimally Invasive and Endoscopic Surgery  [facility]    [address], [address]  [place], [place], [address]  P: [phone]  F: [phone]

## 2023-09-06 ENCOUNTER — TELEPHONE (OUTPATIENT)
Dept: FAMILY MEDICINE CLINIC | Facility: CLINIC | Age: 48
End: 2023-09-06

## 2023-09-06 NOTE — TELEPHONE ENCOUNTER
Caller: Jamie Suárez    Relationship: Self    Best call back number:     Jamie Suárez (Self) [phone] (Mobile)       What was the call regarding:     PATIENT IS HAVING TROUBLES FINDING THE MOUNJARO 10MG MEDICATION AND WANTED TO SEE IF YOU COULD SEND IT TO     [facility]     [facility] [place] - [address] [address] - [phone]  - [phone]  [phone]       Is it okay if the provider responds through MyChart:

## 2023-09-11 NOTE — TELEPHONE ENCOUNTER
PATIENT IS CALLING TO CHECK THE STATUS OF THIS ENCOUNTER.     PLEASE ADVISE     PATIENT STATES THAT HE HAS NOT HAD THIS INJECTION IN SEVERAL WEEKS.

## 2023-09-12 ENCOUNTER — OFFICE VISIT (OUTPATIENT)
Dept: ENDOCRINOLOGY | Age: 48
End: 2023-09-12
Payer: COMMERCIAL

## 2023-09-12 VITALS
OXYGEN SATURATION: 97 % | SYSTOLIC BLOOD PRESSURE: 130 MMHG | HEART RATE: 93 BPM | BODY MASS INDEX: 38.92 KG/M2 | DIASTOLIC BLOOD PRESSURE: 90 MMHG | HEIGHT: 66 IN | WEIGHT: 242.2 LBS | TEMPERATURE: 96.8 F

## 2023-09-12 DIAGNOSIS — E78.2 MIXED HYPERLIPIDEMIA: ICD-10-CM

## 2023-09-12 DIAGNOSIS — E11.21 TYPE 2 DIABETES MELLITUS WITH DIABETIC NEPHROPATHY, WITHOUT LONG-TERM CURRENT USE OF INSULIN: ICD-10-CM

## 2023-09-12 DIAGNOSIS — E66.01 CLASS 2 SEVERE OBESITY DUE TO EXCESS CALORIES WITH SERIOUS COMORBIDITY AND BODY MASS INDEX (BMI) OF 35.0 TO 35.9 IN ADULT: ICD-10-CM

## 2023-09-12 DIAGNOSIS — E11.65 TYPE 2 DIABETES MELLITUS WITH HYPERGLYCEMIA, WITHOUT LONG-TERM CURRENT USE OF INSULIN: Primary | ICD-10-CM

## 2023-09-12 RX ORDER — TIRZEPATIDE 10 MG/.5ML
10 INJECTION, SOLUTION SUBCUTANEOUS WEEKLY
Qty: 6 ML | Refills: 1 | Status: SHIPPED | OUTPATIENT
Start: 2023-09-12

## 2023-09-12 RX ORDER — METFORMIN HYDROCHLORIDE 500 MG/1
1000 TABLET, EXTENDED RELEASE ORAL
Qty: 90 TABLET | Refills: 1 | Status: SHIPPED | OUTPATIENT
Start: 2023-09-12 | End: 2023-12-11

## 2023-09-12 RX ORDER — DAPAGLIFLOZIN 10 MG/1
1 TABLET, FILM COATED ORAL DAILY
Qty: 90 TABLET | Refills: 1 | COMMUNITY
Start: 2023-09-12 | End: 2023-12-11

## 2023-09-14 ENCOUNTER — TELEPHONE (OUTPATIENT)
Dept: ENDOCRINOLOGY | Age: 48
End: 2023-09-14
Payer: COMMERCIAL

## 2023-09-14 DIAGNOSIS — Z79.4 TYPE 2 DIABETES MELLITUS WITHOUT COMPLICATION, WITH LONG-TERM CURRENT USE OF INSULIN: ICD-10-CM

## 2023-09-14 DIAGNOSIS — E11.9 TYPE 2 DIABETES MELLITUS WITHOUT COMPLICATION, WITH LONG-TERM CURRENT USE OF INSULIN: ICD-10-CM

## 2023-09-14 RX ORDER — LANCETS 30 GAUGE
EACH MISCELLANEOUS
Qty: 300 EACH | Refills: 2 | Status: SHIPPED | OUTPATIENT
Start: 2023-09-14

## 2023-09-14 RX ORDER — LANCING DEVICE
EACH MISCELLANEOUS
Qty: 1 EACH | Refills: 0 | Status: SHIPPED | OUTPATIENT
Start: 2023-09-14

## 2023-09-14 NOTE — TELEPHONE ENCOUNTER
Caller: Jamie Suárez    Relationship: Self    Best call back number: [phone]    Requested Prescriptions: Lancets (ACCU-CHEK MULTICLIX) lancets   Requested Prescriptions      No prescriptions requested or ordered in this encounter        Pharmacy where request should be sent: [facility] [address]     Last office visit with prescribing clinician: 9/12/2023   Last telemedicine visit with prescribing clinician: Visit date not found   Next office visit with prescribing clinician: 3/13/2024     Additional details provided by patient: PT IS COMPLETELY OUT OF LANCETS    Does the patient have less than a 3 day supply:  [x] Yes  [] No    Would you like a call back once the refill request has been completed: [x] Yes [] No    If the office needs to give you a call back, can they leave a voicemail: [x] Yes [] No    Evaristo Moran Rep   09/14/23 12:26 EDT

## 2023-09-19 ENCOUNTER — TELEPHONE (OUTPATIENT)
Dept: SURGERY | Facility: CLINIC | Age: 48
End: 2023-09-19
Payer: COMMERCIAL

## 2023-09-19 NOTE — TELEPHONE ENCOUNTER
Patient called to postpone surgery due to strike with his company. He will call back to reschedule.

## 2023-09-25 ENCOUNTER — OFFICE VISIT (OUTPATIENT)
Dept: FAMILY MEDICINE CLINIC | Facility: CLINIC | Age: 48
End: 2023-09-25

## 2023-09-25 VITALS
SYSTOLIC BLOOD PRESSURE: 110 MMHG | HEIGHT: 66 IN | WEIGHT: 237.8 LBS | OXYGEN SATURATION: 99 % | HEART RATE: 78 BPM | BODY MASS INDEX: 38.22 KG/M2 | DIASTOLIC BLOOD PRESSURE: 62 MMHG

## 2023-09-25 DIAGNOSIS — J01.40 ACUTE NON-RECURRENT PANSINUSITIS: Primary | ICD-10-CM

## 2023-09-25 DIAGNOSIS — R09.81 COUGH WITH CONGESTION OF PARANASAL SINUS: ICD-10-CM

## 2023-09-25 DIAGNOSIS — R05.8 COUGH WITH CONGESTION OF PARANASAL SINUS: ICD-10-CM

## 2023-09-25 PROCEDURE — 99213 OFFICE O/P EST LOW 20 MIN: CPT | Performed by: NURSE PRACTITIONER

## 2023-09-25 RX ORDER — AMOXICILLIN AND CLAVULANATE POTASSIUM 875; 125 MG/1; MG/1
1 TABLET, FILM COATED ORAL EVERY 12 HOURS SCHEDULED
Qty: 20 TABLET | Refills: 0 | Status: SHIPPED | OUTPATIENT
Start: 2023-09-25

## 2023-09-25 RX ORDER — DEXTROMETHORPHAN HYDROBROMIDE AND PROMETHAZINE HYDROCHLORIDE 15; 6.25 MG/5ML; MG/5ML
5 SYRUP ORAL 4 TIMES DAILY PRN
Qty: 118 ML | Refills: 0 | Status: SHIPPED | OUTPATIENT
Start: 2023-09-25

## 2023-09-25 NOTE — PROGRESS NOTES
"Chief Complaint  Nasal Congestion (Went to Walk-In clinic on 9/22 for congestion in nose and some in chest. Onset: 9/19. Feels like \"cold air\" in chest. Covid and strep test negative)    Maria Del Rosario Ayala presents to [facility]  as a 47 year old male C/o a 1 week history of sinus pain and pressure, headache, non productive cough, congestion, PND.  Denies any sob mild wheezing with exertion, no fever, abdominal pain, no N/V/D.    He is able to eat and keep down fluid.    Evaluation to date: none  Treatment to date:  seen at  on Friday 09/22/2023 given prednisone and cough suppressant.  Tested at that time negative for Strep,  Covid and influenza Denies exposure to illness    He has no other acute C/o today    The following portions of the patient's history were reviewed and updated as appropriate: allergies, current medications, past family history, past medical history, past social history, past surgical history, and problem list          Review of Systems   HENT:  Positive for congestion, postnasal drip and sinus pressure. Negative for ear pain and sore throat.    Eyes:  Negative for visual disturbance.   Respiratory:  Positive for cough. Negative for shortness of breath and wheezing.    Cardiovascular:  Negative for chest pain, palpitations and leg swelling.   Gastrointestinal:  Negative for abdominal pain, diarrhea, nausea and vomiting.   Skin:  Negative for rash.   Neurological:  Negative for dizziness and light-headedness.      Objective   Vital Signs:   Vitals:    09/25/23 1002   BP: 110/62   BP Location: Left arm   Patient Position: Sitting   Cuff Size: Adult   Pulse: 78   SpO2: 99%   Weight: 108 kg (237 lb 12.8 oz)   Height: 167.6 cm (66\")                  Physical Exam  Vitals reviewed.   Constitutional:       General: He is not in acute distress.  HENT:      Nose: Congestion present.      Right Turbinates: Swollen.      Left Turbinates: Swollen.      Right Sinus: " Maxillary sinus tenderness and frontal sinus tenderness present.      Left Sinus: Maxillary sinus tenderness and frontal sinus tenderness present.   Eyes:      Conjunctiva/sclera: Conjunctivae normal.   Neck:      Thyroid: No thyromegaly.      Vascular: No carotid bruit.   Cardiovascular:      Rate and Rhythm: Normal rate and regular rhythm.      Heart sounds: Normal heart sounds.   Pulmonary:      Effort: Pulmonary effort is normal. No respiratory distress.      Breath sounds: Normal breath sounds. No stridor. No wheezing, rhonchi or rales.   Chest:      Chest wall: No tenderness.   Neurological:      Mental Status: He is alert.   Psychiatric:         Attention and Perception: Attention normal.         Mood and Affect: Mood normal.        Result Review :     The following data was reviewed by: KAYLA Armstrong on 09/25/2023:     Negative influenza a and B, strep, COVID at urgent care on 9/23/2023     Assessment and Plan    Diagnoses and all orders for this visit:    1. Acute non-recurrent pansinusitis (Primary)  Comments:  Take medication as directed follow-up with any worsening symptoms  Orders:  -     amoxicillin-clavulanate (AUGMENTIN) 875-125 MG per tablet; Take 1 tablet by mouth Every 12 (Twelve) Hours. One PO BID for infection with food  Dispense: 20 tablet; Refill: 0    2. Cough with congestion of paranasal sinus  Comments:  Follow-up with any worsening symptoms or no improvements  Orders:  -     promethazine-dextromethorphan (PROMETHAZINE-DM) 6.25-15 MG/5ML syrup; Take 5 mL by mouth 4 (Four) Times a Day As Needed for Cough.  Dispense: 118 mL; Refill: 0    Augmentin was sent to the patient's pharmacy \  Finish Prednisone prescribed by  on 09/22/2023    -Continue Flonase nasal spray as needed  -Take all medications as prescribed and until completed.  -Monitor for fever and take Tylenol as needed.  Drink plenty of fluids and get plenty of rest.  -Use cool-mist humidifier  -Seek immediate medical  attention for fever unrelieved by Tylenol, chest pain, shortness of breath, sharp back pain, or any other worsening signs or symptoms.  -The patient verbalized understanding of all instructions given today.      Follow Up   Return if symptoms worsen or fail to improve.  Patient was given instructions and counseling regarding his condition or for health maintenance advice. Please see specific information pulled into the AVS if appropriate.    Name band;

## 2023-11-02 DIAGNOSIS — E11.65 TYPE 2 DIABETES MELLITUS WITH HYPERGLYCEMIA, WITHOUT LONG-TERM CURRENT USE OF INSULIN: ICD-10-CM

## 2023-11-02 RX ORDER — DAPAGLIFLOZIN 10 MG/1
1 TABLET, FILM COATED ORAL DAILY
Qty: 90 TABLET | Refills: 1 | Status: SHIPPED | OUTPATIENT
Start: 2023-11-02

## 2023-11-07 ENCOUNTER — OFFICE VISIT (OUTPATIENT)
Dept: FAMILY MEDICINE CLINIC | Facility: CLINIC | Age: 48
End: 2023-11-07
Payer: COMMERCIAL

## 2023-11-07 VITALS
TEMPERATURE: 97.9 F | RESPIRATION RATE: 16 BRPM | OXYGEN SATURATION: 96 % | HEART RATE: 110 BPM | BODY MASS INDEX: 37.77 KG/M2 | HEIGHT: 66 IN | SYSTOLIC BLOOD PRESSURE: 136 MMHG | WEIGHT: 235 LBS | DIASTOLIC BLOOD PRESSURE: 91 MMHG

## 2023-11-07 DIAGNOSIS — Z00.00 ANNUAL PHYSICAL EXAM: Primary | ICD-10-CM

## 2023-11-07 DIAGNOSIS — Z12.5 SPECIAL SCREENING FOR MALIGNANT NEOPLASM OF PROSTATE: ICD-10-CM

## 2023-11-07 PROCEDURE — 99396 PREV VISIT EST AGE 40-64: CPT | Performed by: FAMILY MEDICINE

## 2023-11-07 NOTE — PROGRESS NOTES
Subjective   Jamie Suárez is a 48 y.o. male.     CC: Annual Exam    History of Present Illness     Jamie Suárez 48 y.o. male who presents for an Annual Wellness Visit.  he has a history of   Patient Active Problem List   Diagnosis    Disorder of rotator cuff    Shoulder pain    GERD (gastroesophageal reflux disease)    Cervical stenosis of spinal canal    Left cervical radiculopathy    ERRONEOUS ENCOUNTER--DISREGARD    SANA (obstructive sleep apnea)    Class 2 severe obesity due to excess calories with serious comorbidity and body mass index (BMI) of 35.0 to 35.9 in adult    Restless legs syndrome (RLS)    New onset type 2 diabetes mellitus    Acute respiratory failure due to COVID-19    Polycythemia    Diabetes mellitus    Viral upper respiratory illness    Acute cough    Dysphagia    Encounter for screening for malignant neoplasm of colon    SOB (shortness of breath)    Dysphagia    Primary hypertension    Pure hypercholesterolemia    Umbilical hernia without obstruction and without gangrene   .  he has been feeling well.   I  reviewed health maintenance with him as part of my preventative care plan.    The following portions of the patient's history were reviewed and updated as appropriate: allergies, current medications, past family history, past medical history, past social history, past surgical history, and problem list.    Review of Systems   Constitutional:  Negative for appetite change, fever and unexpected weight change.   HENT:  Negative for ear pain, facial swelling and sore throat.    Eyes:  Negative for pain and visual disturbance.   Respiratory:  Negative for chest tightness, shortness of breath and wheezing.    Cardiovascular:  Negative for chest pain and palpitations.   Gastrointestinal:  Negative for abdominal pain and blood in stool.   Endocrine: Negative.    Genitourinary:  Negative for difficulty urinating and hematuria.   Musculoskeletal:  Negative for joint swelling.   Neurological:   "Negative for tremors, seizures and syncope.   Hematological:  Negative for adenopathy.   Psychiatric/Behavioral: Negative.         /91   Pulse 110   Temp 97.9 °F (36.6 °C) (Oral)   Resp 16   Ht 167.6 cm (66\")   Wt 107 kg (235 lb)   SpO2 96%   BMI 37.93 kg/m²     Objective   Physical Exam  Vitals and nursing note reviewed.   Constitutional:       General: He is not in acute distress.     Appearance: He is well-developed. He is not diaphoretic.   HENT:      Head: Normocephalic and atraumatic. Hair is normal.      Right Ear: Hearing, tympanic membrane, ear canal and external ear normal.      Left Ear: Hearing, tympanic membrane, ear canal and external ear normal.      Nose: No nasal deformity.      Mouth/Throat:      Mouth: No oral lesions.      Pharynx: Uvula midline. No uvula swelling.   Eyes:      General: Lids are normal. No scleral icterus.        Right eye: No discharge.         Left eye: No discharge.      Extraocular Movements:      Right eye: Normal extraocular motion and no nystagmus.      Left eye: Normal extraocular motion and no nystagmus.      Conjunctiva/sclera: Conjunctivae normal.      Pupils: Pupils are equal, round, and reactive to light.   Neck:      Thyroid: No thyromegaly.      Vascular: No JVD.      Trachea: No tracheal deviation.   Cardiovascular:      Rate and Rhythm: Normal rate and regular rhythm.      Pulses: Normal pulses.      Heart sounds: Normal heart sounds. No murmur heard.     No gallop.   Pulmonary:      Effort: Pulmonary effort is normal. No respiratory distress.      Breath sounds: Normal breath sounds. No wheezing or rales.   Chest:      Chest wall: No tenderness.   Abdominal:      General: Bowel sounds are normal. There is no distension.      Palpations: Abdomen is soft. There is no mass.      Tenderness: There is no abdominal tenderness. There is no guarding.      Hernia: No hernia is present.   Genitourinary:     Comments: Pt sees urology and is " UTD.  Musculoskeletal:         General: No tenderness or deformity. Normal range of motion.      Cervical back: Normal range of motion and neck supple.   Lymphadenopathy:      Cervical: No cervical adenopathy.   Skin:     General: Skin is warm and dry.      Findings: No rash.   Neurological:      Mental Status: He is alert and oriented to person, place, and time.      Cranial Nerves: No cranial nerve deficit.      Motor: No abnormal muscle tone.      Coordination: Coordination normal.      Deep Tendon Reflexes: Reflexes are normal and symmetric. Reflexes normal.   Psychiatric:         Behavior: Behavior normal.         Thought Content: Thought content normal.         Judgment: Judgment normal.         Assessment & Plan   Diagnoses and all orders for this visit:    1. Annual physical exam (Primary)    2. Special screening for malignant neoplasm of prostate  -     PSA Screen    Healthy diet/exercise/weight management discussed with pt.

## 2023-11-13 DIAGNOSIS — J30.2 SEASONAL ALLERGIES: ICD-10-CM

## 2023-11-14 RX ORDER — CETIRIZINE HYDROCHLORIDE 10 MG/1
10 TABLET ORAL DAILY
Qty: 90 TABLET | Refills: 1 | OUTPATIENT
Start: 2023-11-14

## 2023-11-17 DIAGNOSIS — J45.20 MILD INTERMITTENT ASTHMA WITHOUT COMPLICATION: ICD-10-CM

## 2023-11-17 DIAGNOSIS — E66.01 CLASS 2 SEVERE OBESITY DUE TO EXCESS CALORIES WITH SERIOUS COMORBIDITY AND BODY MASS INDEX (BMI) OF 35.0 TO 35.9 IN ADULT: ICD-10-CM

## 2023-11-17 DIAGNOSIS — E11.21 TYPE 2 DIABETES MELLITUS WITH DIABETIC NEPHROPATHY, WITHOUT LONG-TERM CURRENT USE OF INSULIN: ICD-10-CM

## 2023-11-17 RX ORDER — TIRZEPATIDE 10 MG/.5ML
10 INJECTION, SOLUTION SUBCUTANEOUS WEEKLY
Qty: 6 ML | Refills: 1 | OUTPATIENT
Start: 2023-11-17

## 2023-11-17 NOTE — TELEPHONE ENCOUNTER
Rx Refill Note  Requested Prescriptions     Pending Prescriptions Disp Refills    Mounjaro 10 MG/0.5ML solution pen-injector [Pharmacy Med Name: Mounjaro 10 mg/0.5 mL subcutaneous pen injector] 6 mL 1     Sig: Inject 0.5 mL under the skin into the appropriate area as directed 1 (One) Time Per Week.      Last office visit with prescribing clinician: 11/7/2023   Last telemedicine visit with prescribing clinician: 7/19/2023   Next office visit with prescribing clinician: Visit date not found                         Would you like a call back once the refill request has been completed: [] Yes [] No    If the office needs to give you a call back, can they leave a voicemail: [] Yes [] No    Ashtyn Powell MA  11/17/23, 11:24 EST

## 2023-11-18 RX ORDER — ALBUTEROL SULFATE 90 UG/1
2 AEROSOL, METERED RESPIRATORY (INHALATION) EVERY 4 HOURS PRN
Qty: 18 G | Refills: 2 | Status: SHIPPED | OUTPATIENT
Start: 2023-11-18

## 2023-11-30 ENCOUNTER — PRIOR AUTHORIZATION (OUTPATIENT)
Dept: FAMILY MEDICINE CLINIC | Facility: CLINIC | Age: 48
End: 2023-11-30
Payer: COMMERCIAL

## 2023-11-30 NOTE — TELEPHONE ENCOUNTER
YVONNE CHOUDHARY Key: [id number] - Rx #: [id number] help? Sent to [facility]  Mounjaro 10MG/0.5ML pen-injectors  Form  PRIOR AUTH PENDING

## 2023-12-01 ENCOUNTER — APPOINTMENT (OUTPATIENT)
Dept: GENERAL RADIOLOGY | Facility: HOSPITAL | Age: 48
End: 2023-12-01
Payer: COMMERCIAL

## 2023-12-01 ENCOUNTER — HOSPITAL ENCOUNTER (EMERGENCY)
Facility: HOSPITAL | Age: 48
Discharge: HOME | End: 2023-12-02
Attending: EMERGENCY MEDICINE
Payer: COMMERCIAL

## 2023-12-01 DIAGNOSIS — J06.9 UPPER RESPIRATORY TRACT INFECTION, UNSPECIFIED TYPE: Primary | ICD-10-CM

## 2023-12-01 DIAGNOSIS — R06.00 DYSPNEA, UNSPECIFIED TYPE: ICD-10-CM

## 2023-12-01 LAB
ALBUMIN SERPL-MCNC: 3.7 G/DL (ref 3.5–5.2)
ALBUMIN/GLOB SERPL: 1.2 G/DL
ALP SERPL-CCNC: 71 U/L (ref 39–117)
ALT SERPL W P-5'-P-CCNC: 35 U/L (ref 1–41)
ANION GAP SERPL CALCULATED.3IONS-SCNC: 12.1 MMOL/L (ref 5–15)
AST SERPL-CCNC: 23 U/L (ref 1–40)
BILIRUB SERPL-MCNC: 0.3 MG/DL (ref 0–1.2)
BUN SERPL-MCNC: 20 MG/DL (ref 6–20)
BUN/CREAT SERPL: 16 (ref 7–25)
CALCIUM SPEC-SCNC: 8.8 MG/DL (ref 8.6–10.5)
CHLORIDE SERPL-SCNC: 104 MMOL/L (ref 98–107)
CO2 SERPL-SCNC: 18.9 MMOL/L (ref 22–29)
CREAT SERPL-MCNC: 1.25 MG/DL (ref 0.76–1.27)
D DIMER PPP FEU-MCNC: 0.24 MCGFEU/ML (ref 0–0.5)
EGFRCR SERPLBLD CKD-EPI 2021: 70.6 ML/MIN/1.73
FLUAV SUBTYP SPEC NAA+PROBE: NOT DETECTED
FLUBV RNA ISLT QL NAA+PROBE: NOT DETECTED
GLOBULIN UR ELPH-MCNC: 3.2 GM/DL
GLUCOSE SERPL-MCNC: 196 MG/DL (ref 65–99)
NT-PROBNP SERPL-MCNC: 98.9 PG/ML (ref 0–450)
POTASSIUM SERPL-SCNC: 3.7 MMOL/L (ref 3.5–5.2)
PROT SERPL-MCNC: 6.9 G/DL (ref 6–8.5)
SARS-COV-2 RNA RESP QL NAA+PROBE: NOT DETECTED
SODIUM SERPL-SCNC: 135 MMOL/L (ref 136–145)
TROPONIN T SERPL HS-MCNC: 13 NG/L

## 2023-12-01 PROCEDURE — 87636 SARSCOV2 & INF A&B AMP PRB: CPT | Performed by: EMERGENCY MEDICINE

## 2023-12-01 PROCEDURE — 96360 HYDRATION IV INFUSION INIT: CPT

## 2023-12-01 PROCEDURE — 71046 X-RAY EXAM CHEST 2 VIEWS: CPT

## 2023-12-01 PROCEDURE — 25810000003 SODIUM CHLORIDE 0.9 % SOLUTION: Performed by: EMERGENCY MEDICINE

## 2023-12-01 PROCEDURE — 85025 COMPLETE CBC W/AUTO DIFF WBC: CPT | Performed by: EMERGENCY MEDICINE

## 2023-12-01 PROCEDURE — 93005 ELECTROCARDIOGRAM TRACING: CPT | Performed by: EMERGENCY MEDICINE

## 2023-12-01 PROCEDURE — 99284 EMERGENCY DEPT VISIT MOD MDM: CPT

## 2023-12-01 PROCEDURE — 80053 COMPREHEN METABOLIC PANEL: CPT

## 2023-12-01 PROCEDURE — 85379 FIBRIN DEGRADATION QUANT: CPT | Performed by: EMERGENCY MEDICINE

## 2023-12-01 PROCEDURE — 83880 ASSAY OF NATRIURETIC PEPTIDE: CPT | Performed by: EMERGENCY MEDICINE

## 2023-12-01 PROCEDURE — 84484 ASSAY OF TROPONIN QUANT: CPT

## 2023-12-01 RX ORDER — SODIUM CHLORIDE 0.9 % (FLUSH) 0.9 %
10 SYRINGE (ML) INJECTION AS NEEDED
Status: DISCONTINUED | OUTPATIENT
Start: 2023-12-01 | End: 2023-12-02 | Stop reason: HOSPADM

## 2023-12-01 RX ORDER — IPRATROPIUM BROMIDE AND ALBUTEROL SULFATE 2.5; .5 MG/3ML; MG/3ML
3 SOLUTION RESPIRATORY (INHALATION) ONCE
Status: COMPLETED | OUTPATIENT
Start: 2023-12-02 | End: 2023-12-01

## 2023-12-01 RX ADMIN — IPRATROPIUM BROMIDE AND ALBUTEROL SULFATE 3 ML: 2.5; .5 SOLUTION RESPIRATORY (INHALATION) at 23:48

## 2023-12-01 RX ADMIN — SODIUM CHLORIDE 1000 ML: 9 INJECTION, SOLUTION INTRAVENOUS at 23:48

## 2023-12-02 VITALS
BODY MASS INDEX: 38.57 KG/M2 | DIASTOLIC BLOOD PRESSURE: 80 MMHG | WEIGHT: 240 LBS | SYSTOLIC BLOOD PRESSURE: 130 MMHG | HEIGHT: 66 IN | HEART RATE: 109 BPM | RESPIRATION RATE: 18 BRPM | OXYGEN SATURATION: 100 % | TEMPERATURE: 98.1 F

## 2023-12-02 LAB
BASOPHILS # BLD AUTO: 0.05 10*3/MM3 (ref 0–0.2)
BASOPHILS NFR BLD AUTO: 0.5 % (ref 0–1.5)
DEPRECATED RDW RBC AUTO: 45.7 FL (ref 37–54)
EOSINOPHIL # BLD AUTO: 0.23 10*3/MM3 (ref 0–0.4)
EOSINOPHIL NFR BLD AUTO: 2.1 % (ref 0.3–6.2)
ERYTHROCYTE [DISTWIDTH] IN BLOOD BY AUTOMATED COUNT: 13.5 % (ref 12.3–15.4)
HCT VFR BLD AUTO: 54.5 % (ref 37.5–51)
HGB BLD-MCNC: 18.3 G/DL (ref 13–17.7)
LYMPHOCYTES # BLD AUTO: 4.84 10*3/MM3 (ref 0.7–3.1)
LYMPHOCYTES NFR BLD AUTO: 44.8 % (ref 19.6–45.3)
MCH RBC QN AUTO: 30.4 PG (ref 26.6–33)
MCHC RBC AUTO-ENTMCNC: 33.6 G/DL (ref 31.5–35.7)
MCV RBC AUTO: 90.5 FL (ref 79–97)
MONOCYTES # BLD AUTO: 0.75 10*3/MM3 (ref 0.1–0.9)
MONOCYTES NFR BLD AUTO: 6.9 % (ref 5–12)
NEUTROPHILS NFR BLD AUTO: 4.89 10*3/MM3 (ref 1.7–7)
NEUTROPHILS NFR BLD AUTO: 45.3 % (ref 42.7–76)
PLATELET # BLD AUTO: 217 10*3/MM3 (ref 140–450)
PMV BLD AUTO: 10.8 FL (ref 6–12)
QT INTERVAL: 341 MS
QTC INTERVAL: 431 MS
RBC # BLD AUTO: 6.02 10*6/MM3 (ref 4.14–5.8)
WBC NRBC COR # BLD AUTO: 10.77 10*3/MM3 (ref 3.4–10.8)

## 2023-12-02 PROCEDURE — 94799 UNLISTED PULMONARY SVC/PX: CPT

## 2023-12-02 RX ORDER — ALBUTEROL SULFATE 90 UG/1
2 AEROSOL, METERED RESPIRATORY (INHALATION) EVERY 4 HOURS PRN
Qty: 6.7 G | Refills: 0 | Status: SHIPPED | OUTPATIENT
Start: 2023-12-02

## 2023-12-02 RX ORDER — PREDNISONE 20 MG/1
40 TABLET ORAL DAILY
Qty: 14 TABLET | Refills: 0 | Status: SHIPPED | OUTPATIENT
Start: 2023-12-02 | End: 2023-12-09

## 2023-12-02 RX ORDER — BROMPHENIRAMINE MALEATE, PSEUDOEPHEDRINE HYDROCHLORIDE, AND DEXTROMETHORPHAN HYDROBROMIDE 2; 30; 10 MG/5ML; MG/5ML; MG/5ML
5 SYRUP ORAL 4 TIMES DAILY PRN
Qty: 118 ML | Refills: 0 | Status: SHIPPED | OUTPATIENT
Start: 2023-12-02

## 2023-12-02 RX ORDER — IPRATROPIUM BROMIDE AND ALBUTEROL SULFATE 2.5; .5 MG/3ML; MG/3ML
3 SOLUTION RESPIRATORY (INHALATION) ONCE
Status: COMPLETED | OUTPATIENT
Start: 2023-12-02 | End: 2023-12-02

## 2023-12-02 RX ADMIN — IPRATROPIUM BROMIDE AND ALBUTEROL SULFATE 3 ML: 2.5; .5 SOLUTION RESPIRATORY (INHALATION) at 00:41

## 2023-12-02 NOTE — FSED PROVIDER NOTE
"Subjective   History of Present Illness  49yo male pmh significant dm2 presents ED c/o 2-3d hx nonproductive cough/sinus congestion/soa.  Pt states \"I get this every fall and I have been told that I am borderline asthmatic.\"  ROS neg fever/chills/hemoptysis/chest pain/abd pain/nausea/vomiting/orthopnea/pnd/edema.    History provided by:  Patient  URI  Presenting symptoms: congestion, cough and rhinorrhea    Associated symptoms: no wheezing        Review of Systems   Constitutional: Negative.    HENT:  Positive for congestion and rhinorrhea.    Eyes: Negative.    Respiratory:  Positive for cough and shortness of breath. Negative for wheezing.    Cardiovascular: Negative.    Gastrointestinal: Negative.    Genitourinary: Negative.    Allergic/Immunologic: Negative for immunocompromised state.   All other systems reviewed and are negative.      Past Medical History:   Diagnosis Date    Bronchitis     COVID-19 03/2021    Diabetes mellitus     GERD (gastroesophageal reflux disease)     Reflux    Hiatal hernia     Nausea and vomiting 01/16/2019    Added automatically from request for surgery [id number]    Obesity        Allergies   Allergen Reactions    Morphine And Related Rash     He had a rash with morphine but no  Problem  with codiene       Past Surgical History:   Procedure Laterality Date    APPENDECTOMY      COLONOSCOPY W/ POLYPECTOMY N/A 4/20/2023    Procedure: COLONOSCOPY TO CECUM WITH COLD SNARE POLYPECTOMIES;  Surgeon: Lew Riddle MD;  Location: [facility] ENDOSCOPY;  Service: Gastroenterology;  Laterality: N/A;  pre: SCREENING  post: POLYPS    ENDOSCOPY N/A 1/21/2019    Procedure: ESOPHAGOGASTRODUODENOSCOPY with biopsy;  Surgeon: Lew Riddle MD;  Location: [facility] ENDOSCOPY;  Service: Gastroenterology    ENDOSCOPY N/A 4/20/2023    Procedure: ESOPHAGOGASTRODUODENOSCOPY WITH BIOPSIES;  Surgeon: Lew Riddle MD;  Location: [facility] ENDOSCOPY;  Service: Gastroenterology;  Laterality: N/A;  " pre: HEARTBURN  post: HIATAL HERNIA    HERNIA REPAIR         Family History   Problem Relation Age of Onset    Heart disease Mother     Hypertension Mother     Diabetes Father     Malig Hyperthermia Neg Hx        Social History     Socioeconomic History    Marital status: Single    Number of children: 5   Tobacco Use    Smoking status: Never    Smokeless tobacco: Never   Vaping Use    Vaping Use: Never used   Substance and Sexual Activity    Alcohol use: No    Drug use: No    Sexual activity: Defer           Objective   Physical Exam  Vitals and nursing note reviewed.   Constitutional:       Appearance: Normal appearance.   HENT:      Head: Normocephalic and atraumatic.      Right Ear: External ear normal.      Left Ear: External ear normal.      Nose: Nose normal.      Mouth/Throat:      Mouth: Mucous membranes are moist.      Pharynx: Oropharynx is clear. No oropharyngeal exudate or posterior oropharyngeal erythema.   Eyes:      Pupils: Pupils are equal, round, and reactive to light.   Cardiovascular:      Rate and Rhythm: Normal rate and regular rhythm.      Pulses: Normal pulses.      Heart sounds: Normal heart sounds. No murmur heard.     No friction rub. No gallop.   Pulmonary:      Effort: Pulmonary effort is normal. No respiratory distress.      Breath sounds: Normal breath sounds. No stridor. No wheezing, rhonchi or rales.   Abdominal:      General: Bowel sounds are normal. There is no distension.      Palpations: Abdomen is soft.      Tenderness: There is no abdominal tenderness. There is no guarding or rebound.   Musculoskeletal:         General: No swelling or deformity.      Cervical back: Normal range of motion and neck supple. No rigidity.      Right lower leg: No edema.      Left lower leg: No edema.   Lymphadenopathy:      Cervical: No cervical adenopathy.   Skin:     General: Skin is warm.   Neurological:      General: No focal deficit present.      Mental Status: He is alert and oriented to  person, place, and time.      GCS: GCS eye subscore is 4. GCS verbal subscore is 5. GCS motor subscore is 6.      Sensory: Sensation is intact.      Motor: Motor function is intact.         ECG 12 Lead      Date/Time: 12/2/2023 1:11 AM    Performed by: Kasi Snell MD  Authorized by: Kasi Snell MD  Interpreted by physician  Rhythm: sinus rhythm  Rate: normal  BPM: 96  QRS axis: normal  Conduction: conduction normal  ST Segments: ST segments normal  T Waves: T waves normal  Other: no other findings  Clinical impression: normal ECG               ED Course      Labs Reviewed   COMPREHENSIVE METABOLIC PANEL - Abnormal; Notable for the following components:       Result Value    Glucose 196 (*)     Sodium 135 (*)     CO2 18.9 (*)     All other components within normal limits    Narrative:     GFR Normal >60  Chronic Kidney Disease <60  Kidney Failure <15     COVID-19 AND FLU A/B, NP SWAB IN TRANSPORT MEDIA 1 HR TAT - Normal    Narrative:     Fact sheet for providers: https://www.fda.gov/media/369496/download    Fact sheet for patients: https://www.fda.gov/media/562669/download    Test performed by PCR.   BNP (IN-HOUSE) - Normal    Narrative:     This assay is used as an aid in the diagnosis of individuals suspected of having heart failure. It can be used as an aid in the diagnosis of acute decompensated heart failure (ADHF) in patients presenting with signs and symptoms of ADHF to the emergency department (ED). In addition, NT-proBNP of <300 pg/mL indicates ADHF is not likely.    Age Range Result Interpretation  NT-proBNP Concentration (pg/mL:      <50             Positive            >450                   Gray                 300-450                    Negative             <300    50-75           Positive            >900                  Gray                300-900                  Negative            <300      >75             Positive            >1800                  Gray                300-1800                 "  Negative            <300   SINGLE HSTROPONIN T - Normal    Narrative:     High Sensitive Troponin T Reference Range:  <14.0 ng/L- Negative Female for AMI  <22.0 ng/L- Negative Male for AMI  >=14 - Abnormal Female indicating possible myocardial injury.  >=22 - Abnormal Male indicating possible myocardial injury.   Clinicians would have to utilize clinical acumen, EKG, Troponin, and serial changes to determine if it is an Acute Myocardial Infarction or myocardial injury due to an underlying chronic condition.        D-DIMER, QUANTITATIVE - Normal    Narrative:     According to the assay 's published package insert, a normal (<0.50 MCGFEU/mL) D-dimer result in conjunction with a non-high clinical probability assessment, excludes deep vein thrombosis (DVT) and pulmonary embolism (PE) with high sensitivity.    D-dimer values increase with age and this can make VTE exclusion of an older population difficult. To address this, the American College of Physicians, based on best available evidence and recent guidelines, recommends that clinicians use age-adjusted D-dimer thresholds in patients greater than 50 years of age with: a) a low probability of PE who do not meet all Pulmonary Embolism Rule Out Criteria, or b) in those with intermediate probability of PE.   The formula for an age-adjusted D-dimer cut-off is \"age/100\".  For example, a 60 year old patient would have an age-adjusted cut-off of 0.60 MCGFEU/mL and an 80 year old 0.80 MCGFEU/mL.   CBC WITH AUTO DIFFERENTIAL   MANUAL DIFFERENTIAL   CBC AND DIFFERENTIAL    Narrative:     The following orders were created for panel order CBC & Differential.  Procedure                               Abnormality         Status                     ---------                               -----------         ------                     CBC Auto Differential[[id number]]                            In process                   Please view results for these tests on the " individual orders.     XR Chest 2 View    Result Date: 12/2/2023  Narrative: Patient: YVONNE CHOUDHARY  Time Out: 00:19 Exam(s): XR CXR 2 VIEWS EXAM:   XR Chest, 2 Views CLINICAL HISTORY:    Reason for exam: SOA Triage Protocol. TECHNIQUE:   Frontal and lateral views of the chest. COMPARISON: 4 6 2021. FINDINGS:   Lungs:  Unremarkable.  No consolidation.   Pleural space:  Unremarkable.  No pneumothorax.   Heart:  Unremarkable.  No cardiomegaly.   Mediastinum:  Unremarkable.  Normal mediastinal contour.   Bones joints:  Unremarkable.  No acute fracture. IMPRESSION:       Normal chest x-rays.     Impression: Electronically signed by Abran Ventura MD on 12-02-23 at 0019    XR Ankle 3+ View Right, XR Foot 3+ View Right    Result Date: 11/3/2023  Narrative: XR ANKLE 3+ VW RIGHT-, XR FOOT 3+ VW RIGHT-  Clinical: Fell, pain  FINDINGS: Normal right ankle. Inferior calcaneal spur. Bone hypertrophy at the Achilles insertion. Subtalar articulations preserved. Accessory ossicle adjacent to the base of the cuboid. No acute mid or forefoot abnormality is demonstrated. Soft tissues have a satisfactory appearance.  CONCLUSION: No acute osseous or articular abnormality of the right ankle or right foot.  This report was finalized on 11/3/2023 10:57 AM by Dr. Christiano Cassidy M.D on Workstation: [id number]                                          Medical Decision Making  Labs/radiographic findings reviewed.  Pt denies chest pain.  EKG: NSR/rate 96/normal axis/no acute STTW changes.  CXR: no active disease.  CBC/BMP: unremarkable.  BNP: normal.  D dimer: negative.  hsTnT: neg. Covid-19/influenza (neg). Sao2 98% RA.  Pt reports symptomatic improvement with duoneb.  Plan discharge with prednisone 40mg daily x7d/albuterol hfa prn/bromfed dm prn.  Pmd followup.  Return precautions provided.    Problems Addressed:  Dyspnea, unspecified type: complicated acute illness or injury  Upper respiratory tract infection, unspecified type:  complicated acute illness or injury    Amount and/or Complexity of Data Reviewed  Labs: ordered.  Radiology: ordered.  ECG/medicine tests: ordered and independent interpretation performed.    Risk  Prescription drug management.        Final diagnoses:   Upper respiratory tract infection, unspecified type   Dyspnea, unspecified type       ED Disposition  ED Disposition       ED Disposition   Discharge    Condition   Good    Comment   --               Harrison Broderick MD  [address]  [facility] [place] [address]  [facility] [address]  [phone]    In 2 days           Medication List        New Prescriptions      brompheniramine-pseudoephedrine-DM 30-2-10 MG/5ML syrup  Take 5 mL by mouth 4 (Four) Times a Day As Needed for Congestion or Cough.     predniSONE 20 MG tablet  Commonly known as: DELTASONE  Take 2 tablets by mouth Daily for 7 days.            Changed      * albuterol sulfate  (90 Base) MCG/ACT inhaler  Commonly known as: PROVENTIL HFA;VENTOLIN HFA;PROAIR HFA  Inhale 2 puffs Every 4 (Four) Hours As Needed for Wheezing.  What changed: Another medication with the same name was added. Make sure you understand how and when to take each.     * albuterol sulfate  (90 Base) MCG/ACT inhaler  Commonly known as: PROVENTIL HFA;VENTOLIN HFA;PROAIR HFA  Inhale 2 puffs Every 4 (Four) Hours As Needed for Wheezing or Shortness of Air.  What changed: You were already taking a medication with the same name, and this prescription was added. Make sure you understand how and when to take each.           * This list has 2 medication(s) that are the same as other medications prescribed for you. Read the directions carefully, and ask your doctor or other care provider to review them with you.                   Where to Get Your Medications        These medications were sent to [facility]/pharmacy #[id number] - [place], [place] - [address] AT [facility] - [phone]  - [phone]   [address]  [facility] [address]      Phone: [phone]   albuterol sulfate  (90 Base) MCG/ACT inhaler  brompheniramine-pseudoephedrine-DM 30-2-10 MG/5ML syrup  predniSONE 20 MG tablet

## 2023-12-04 NOTE — TELEPHONE ENCOUNTER
This medication currently does not require clinical review, and you may pick this up from your  pharmacy.

## 2023-12-12 DIAGNOSIS — E11.65 TYPE 2 DIABETES MELLITUS WITH HYPERGLYCEMIA, WITHOUT LONG-TERM CURRENT USE OF INSULIN: ICD-10-CM

## 2023-12-12 RX ORDER — METFORMIN HYDROCHLORIDE 500 MG/1
1000 TABLET, EXTENDED RELEASE ORAL
Qty: 90 TABLET | Refills: 1 | OUTPATIENT
Start: 2023-12-12

## 2024-01-11 RX ORDER — OMEPRAZOLE 40 MG/1
40 CAPSULE, DELAYED RELEASE ORAL DAILY
Qty: 90 CAPSULE | Refills: 3 | OUTPATIENT
Start: 2024-01-11

## 2024-01-11 NOTE — TELEPHONE ENCOUNTER
Caller: Jamie Suárez    Relationship: Self    Best call back number: [phone]    Requested Prescriptions:   Requested Prescriptions     Pending Prescriptions Disp Refills    omeprazole (priLOSEC) 40 MG capsule 90 capsule 3     Sig: Take 1 capsule by mouth Daily. For GERD        Pharmacy where request should be sent: [id number] - [facility] [place] [address] [facility] [phone]  - [phone]      Last office visit with prescribing clinician: 11/7/2023   Last telemedicine visit with prescribing clinician: 7/19/2023   Next office visit with prescribing clinician: Visit date not found     Additional details provided by patient: OUT OF MEDICATION    Does the patient have less than a 3 day supply:  [x] Yes  [] No    Would you like a call back once the refill request has been completed: [] Yes [x] No    If the office needs to give you a call back, can they leave a voicemail: [] Yes [x] No    Evaristo Krishnamurthy Rep   01/11/24 10:43 EST

## 2024-02-07 NOTE — PROGRESS NOTES
Maria Del Rosario Suárez is a 48 y.o. male.     CC: Foot Pain    History of Present Illness     Patient comes in reporting some right foot pain issues since falling off a ladder in November.  Patient was seen at the [facility] emergency department on February 2, 2024 with this note:    History of Present Illness  Patient presents with a several day history of left shoulder pain.  He also states that he fell back in December injuring his right foot.  He reports his x-rays are negative for fracture but that the right foot has become more painful over the last few days.  The pain is over the dorsal lateral aspect.  The shoulder primarily is affected when he tries to abduct and flex at the left shoulder.  He is right-hand dominant.  No numbness or tingling into the left upper extremity    Right foot is tender to palpation over the dorsal lateral aspect.  Overlying skin is normal.  No soft tissue swelling or deformity noted.  The ankle itself is nontender.  DP PT are 2+ and equal    X-rays of both the foot and the shoulder were negative.    Final diagnoses:  Shoulder strain, left, initial encounter  Strain of right foot, initial encounter    New Prescriptions      diclofenac 75 MG EC tablet  Commonly known as: VOLTAREN  Take 1 tablet by mouth 2 (Two) Times a Day for 10 days.     HYDROcodone-acetaminophen 5-325 MG per tablet  Commonly known as: NORCO  Take 1 tablet by mouth Every 6 (Six) Hours As Needed for Moderate Pain for up to 3 days.    Patient still having a great deal of pain with bearing weight on that lateral right foot.          The following portions of the patient's history were reviewed and updated as appropriate: allergies, current medications, past family history, past medical history, past social history, past surgical history, and problem list.    Review of Systems   Constitutional:  Negative for activity change, chills and fever.   Respiratory:  Negative for cough.    Cardiovascular:  Negative for chest  "pain.   Psychiatric/Behavioral:  Negative for dysphoric mood.        /92   Pulse 80   Temp 98.5 °F (36.9 °C) (Oral)   Resp 16   Ht 167.6 cm (66\")   Wt 104 kg (230 lb)   SpO2 96%   BMI 37.12 kg/m²     Objective   Physical Exam  Constitutional:       General: He is not in acute distress.     Appearance: He is well-developed.   Pulmonary:      Effort: Pulmonary effort is normal.   Musculoskeletal:        Feet:    Feet:      Comments: Quite tender but without any step-offs.  Neurological:      Mental Status: He is alert and oriented to person, place, and time.   Psychiatric:         Behavior: Behavior normal.         Thought Content: Thought content normal.     Hospital records reviewed with pt confirming HPI.      Assessment & Plan   Diagnoses and all orders for this visit:    1. Chronic pain in right foot (Primary)  -     diclofenac (VOLTAREN) 75 MG EC tablet; Take 1 tablet by mouth 2 (Two) Times a Day.  Dispense: 60 tablet; Refill: 1  -     Ambulatory Referral to Podiatry  -     Miscellaneous DME    First of all we will stabilize this with a cam boot and will get over to podiatry.  With the duration that he has as well as negative x-rays, will most likely need an MRI of this area to rule out stress fracture and/or ligamentous/tendon disruption issues.  Will defer that judgment though to podiatry to make that decision.             "

## 2024-02-08 ENCOUNTER — OFFICE VISIT (OUTPATIENT)
Dept: FAMILY MEDICINE CLINIC | Facility: CLINIC | Age: 49
End: 2024-02-08
Payer: COMMERCIAL

## 2024-02-08 VITALS
RESPIRATION RATE: 16 BRPM | HEART RATE: 80 BPM | BODY MASS INDEX: 36.96 KG/M2 | HEIGHT: 66 IN | DIASTOLIC BLOOD PRESSURE: 92 MMHG | TEMPERATURE: 98.5 F | OXYGEN SATURATION: 96 % | SYSTOLIC BLOOD PRESSURE: 141 MMHG | WEIGHT: 230 LBS

## 2024-02-08 DIAGNOSIS — G89.29 CHRONIC PAIN IN RIGHT FOOT: Primary | ICD-10-CM

## 2024-02-08 DIAGNOSIS — M79.671 CHRONIC PAIN IN RIGHT FOOT: Primary | ICD-10-CM

## 2024-02-08 PROCEDURE — 99214 OFFICE O/P EST MOD 30 MIN: CPT | Performed by: FAMILY MEDICINE

## 2024-02-08 RX ORDER — DICLOFENAC SODIUM 75 MG/1
75 TABLET, DELAYED RELEASE ORAL 2 TIMES DAILY
Qty: 60 TABLET | Refills: 1 | Status: SHIPPED | OUTPATIENT
Start: 2024-02-08

## 2024-02-15 ENCOUNTER — TELEPHONE (OUTPATIENT)
Dept: SURGERY | Facility: CLINIC | Age: 49
End: 2024-02-15
Payer: COMMERCIAL

## 2024-02-21 ENCOUNTER — PRE-ADMISSION TESTING (OUTPATIENT)
Dept: PREADMISSION TESTING | Facility: HOSPITAL | Age: 49
End: 2024-02-21
Payer: COMMERCIAL

## 2024-02-21 VITALS
RESPIRATION RATE: 16 BRPM | DIASTOLIC BLOOD PRESSURE: 90 MMHG | HEART RATE: 76 BPM | SYSTOLIC BLOOD PRESSURE: 134 MMHG | OXYGEN SATURATION: 97 % | HEIGHT: 66 IN | TEMPERATURE: 97.6 F | WEIGHT: 239 LBS | BODY MASS INDEX: 38.41 KG/M2

## 2024-02-21 DIAGNOSIS — K42.9 UMBILICAL HERNIA WITHOUT OBSTRUCTION AND WITHOUT GANGRENE: ICD-10-CM

## 2024-02-21 LAB
ANION GAP SERPL CALCULATED.3IONS-SCNC: 10 MMOL/L (ref 5–15)
BUN SERPL-MCNC: 17 MG/DL (ref 6–20)
BUN/CREAT SERPL: 13.3 (ref 7–25)
CALCIUM SPEC-SCNC: 8.7 MG/DL (ref 8.6–10.5)
CHLORIDE SERPL-SCNC: 107 MMOL/L (ref 98–107)
CO2 SERPL-SCNC: 25 MMOL/L (ref 22–29)
CREAT SERPL-MCNC: 1.28 MG/DL (ref 0.76–1.27)
DEPRECATED RDW RBC AUTO: 40.7 FL (ref 37–54)
EGFRCR SERPLBLD CKD-EPI 2021: 68.6 ML/MIN/1.73
ERYTHROCYTE [DISTWIDTH] IN BLOOD BY AUTOMATED COUNT: 12.5 % (ref 12.3–15.4)
GLUCOSE SERPL-MCNC: 105 MG/DL (ref 65–99)
HCT VFR BLD AUTO: 53.6 % (ref 37.5–51)
HGB BLD-MCNC: 17.9 G/DL (ref 13–17.7)
MCH RBC QN AUTO: 29.9 PG (ref 26.6–33)
MCHC RBC AUTO-ENTMCNC: 33.4 G/DL (ref 31.5–35.7)
MCV RBC AUTO: 89.5 FL (ref 79–97)
PLATELET # BLD AUTO: 252 10*3/MM3 (ref 140–450)
PMV BLD AUTO: 10.4 FL (ref 6–12)
POTASSIUM SERPL-SCNC: 4 MMOL/L (ref 3.5–5.2)
QT INTERVAL: 389 MS
QTC INTERVAL: 438 MS
RBC # BLD AUTO: 5.99 10*6/MM3 (ref 4.14–5.8)
SODIUM SERPL-SCNC: 142 MMOL/L (ref 136–145)
WBC NRBC COR # BLD AUTO: 10.02 10*3/MM3 (ref 3.4–10.8)

## 2024-02-21 PROCEDURE — 85027 COMPLETE CBC AUTOMATED: CPT

## 2024-02-21 PROCEDURE — 93010 ELECTROCARDIOGRAM REPORT: CPT | Performed by: INTERNAL MEDICINE

## 2024-02-21 PROCEDURE — 36415 COLL VENOUS BLD VENIPUNCTURE: CPT

## 2024-02-21 PROCEDURE — 80048 BASIC METABOLIC PNL TOTAL CA: CPT

## 2024-02-21 PROCEDURE — 93005 ELECTROCARDIOGRAM TRACING: CPT

## 2024-02-21 RX ORDER — ROSUVASTATIN CALCIUM 10 MG/1
10 TABLET, COATED ORAL NIGHTLY
COMMUNITY
Start: 2024-01-18

## 2024-02-21 RX ORDER — MULTIVIT WITH MINERALS/LUTEIN
250 TABLET ORAL DAILY
COMMUNITY

## 2024-02-21 NOTE — DISCHARGE INSTRUCTIONS
Take the following medications the morning of surgery:    OMEPRAZOLE      Time of arrival 6:00    If you are on prescription narcotic pain medication to control your pain you may also take that medication the morning of surgery.    General Instructions:  Do not eat solid food after midnight the night before surgery.  You may drink clear liquids day of surgery but must stop at least one hour before your hospital arrival time.  It is beneficial for you to have a clear drink that contains carbohydrates the day of surgery.  We suggest a 12 to 20 ounce bottle of Gatorade or Powerade for non-diabetic patients or a 12 to 20 ounce bottle of G2 or Powerade Zero for diabetic patients. (Pediatric patients, are not advised to drink a 12 to 20 ounce carbohydrate drink)    Clear liquids are liquids you can see through.  Nothing red in color.     Plain water                               Sports drinks  Sodas                                   Gelatin (Jell-O)  Fruit juices without pulp such as white grape juice and apple juice  Popsicles that contain no fruit or yogurt  Tea or coffee (no cream or milk added)  Gatorade / Powerade  G2 / Powerade Zero    Infants may have breast milk up to four hours before surgery.  Infants drinking formula may drink formula up to six hours before surgery.   Patients who avoid smoking, chewing tobacco and alcohol for 4 weeks prior to surgery have a reduced risk of post-operative complications.  Quit smoking as many days before surgery as you can.  Do not smoke, use chewing tobacco or drink alcohol the day of surgery.   If applicable bring your C-PAP/ BI-PAP machine in with you to preop day of surgery.  Bring any papers given to you in the doctor’s office.  Wear clean comfortable clothes.  Do not wear contact lenses, false eyelashes or make-up.  Bring a case for your glasses.   Bring crutches or walker if applicable.  Remove all piercings.  Leave jewelry and any other valuables at home.  Hair  extensions with metal clips must be removed prior to surgery.  The Pre-Admission Testing nurse will instruct you to bring medications if unable to obtain an accurate list in Pre-Admission Testing.            Preventing a Surgical Site Infection:  For 2 to 3 days before surgery, avoid shaving with a razor because the razor can irritate skin and make it easier to develop an infection.    Any areas of open skin can increase the risk of a post-operative wound infection by allowing bacteria to enter and travel throughout the body.  Notify your surgeon if you have any skin wounds / rashes even if it is not near the expected surgical site.  The area will need assessed to determine if surgery should be delayed until it is healed.  The night prior to surgery shower using a fresh bar of anti-bacterial soap (such as Dial) and clean washcloth.  Sleep in a clean bed with clean clothing.  Do not allow pets to sleep with you.  Shower on the morning of surgery using a fresh bar of anti-bacterial soap (such as Dial) and clean washcloth.  Dry with a clean towel and dress in clean clothing.  Ask your surgeon if you will be receiving antibiotics prior to surgery.  Make sure you, your family, and all healthcare providers clean their hands with soap and water or an alcohol based hand  before caring for you or your wound.    Day of surgery:  Your arrival time is approximately two hours before your scheduled surgery time.  Upon arrival, a Pre-op nurse and Anesthesiologist will review your health history, obtain vital signs, and answer questions you may have.  The only belongings needed at this time will be a list of your home medications and if applicable your C-PAP/BI-PAP machine.  A Pre-op nurse will start an IV and you may receive medication in preparation for surgery, including something to help you relax.     Please be aware that surgery does come with discomfort.  We want to make every effort to control your discomfort so  please discuss any uncontrolled symptoms with your nurse.   Your doctor will most likely have prescribed pain medications.      If you are going home after surgery you will receive individualized written care instructions before being discharged.  A responsible adult must drive you to and from the hospital on the day of your surgery and ideally stay with you through the night.   .  Discharge prescriptions can be filled by the hospital pharmacy during regular pharmacy hours.  If you are having surgery late in the day/evening your prescription may be e-prescribed to your pharmacy.  Please verify your pharmacy hours or chose a 24 hour pharmacy to avoid not having access to your prescription because your pharmacy has closed for the day.    If you are staying overnight following surgery, you will be transported to your hospital room following the recovery period.  [facility] has all private rooms.    If you have any questions please call Pre-Admission Testing at [phone].  Deductibles and co-payments are collected on the day of service. Please be prepared to pay the required co-pay, deductible or deposit on the day of service as defined by your plan.    Call your surgeon immediately if you experience any of the following symptoms:  Sore Throat  Shortness of Breath or difficulty breathing  Cough  Chills  Body soreness or muscle pain  Headache  Fever  New loss of taste or smell  Do not arrive for your surgery ill.  Your procedure will need to be rescheduled to another time.  You will need to call your physician before the day of surgery to avoid any unnecessary exposure to hospital staff as well as other patients.  CHLORHEXIDINE CLOTH INSTRUCTIONS  The morning of surgery follow these instructions using the Chlorhexidine cloths you've been given.  These steps reduce bacteria on the body.  Do not use the cloths near your eyes, ears mouth, genitalia or on open wounds.  Throw the cloths away after use but  do not try to flush them down a toilet.      Open and remove one cloth at a time from the package.    Leave the cloth unfolded and begin the bathing.  Massage the skin with the cloths using gentle pressure to remove bacteria.  Do not scrub harshly.   Follow the steps below with one 2% CHG cloth per area (6 total cloths).  One cloth for neck, shoulders and chest.  One cloth for both arms, hands, fingers and underarms (do underarms last).  One cloth for the abdomen followed by groin.  One cloth for right leg and foot including between the toes.  One cloth for left leg and foot including between the toes.  The last cloth is to be used for the back of the neck, back and buttocks.    Allow the CHG to air dry 3 minutes on the skin which will give it time to work and decrease the chance of irritation.  The skin may feel sticky until it is dry.  Do not rinse with water or any other liquid or you will lose the beneficial effects of the CHG.  If mild skin irritation occurs, do rinse the skin to remove the CHG.  Report this to the nurse at time of admission.  Do not apply lotions, creams, ointments, deodorants or perfumes after using the clothes. Dress in clean clothes before coming to the hospital.

## 2024-02-27 ENCOUNTER — ANESTHESIA (OUTPATIENT)
Dept: PERIOP | Facility: HOSPITAL | Age: 49
End: 2024-02-27
Payer: COMMERCIAL

## 2024-02-27 ENCOUNTER — HOSPITAL ENCOUNTER (OUTPATIENT)
Facility: HOSPITAL | Age: 49
Setting detail: HOSPITAL OUTPATIENT SURGERY
Discharge: HOME | End: 2024-02-27
Attending: SURGERY | Admitting: SURGERY
Payer: COMMERCIAL

## 2024-02-27 ENCOUNTER — ANESTHESIA EVENT (OUTPATIENT)
Dept: PERIOP | Facility: HOSPITAL | Age: 49
End: 2024-02-27
Payer: COMMERCIAL

## 2024-02-27 VITALS
DIASTOLIC BLOOD PRESSURE: 81 MMHG | TEMPERATURE: 97.7 F | RESPIRATION RATE: 16 BRPM | HEART RATE: 88 BPM | OXYGEN SATURATION: 95 % | SYSTOLIC BLOOD PRESSURE: 117 MMHG

## 2024-02-27 DIAGNOSIS — K42.9 UMBILICAL HERNIA WITHOUT OBSTRUCTION AND WITHOUT GANGRENE: ICD-10-CM

## 2024-02-27 LAB
GLUCOSE BLDC GLUCOMTR-MCNC: 114 MG/DL (ref 70–130)
GLUCOSE BLDC GLUCOMTR-MCNC: 131 MG/DL (ref 70–130)

## 2024-02-27 PROCEDURE — C1781 MESH (IMPLANTABLE): HCPCS | Performed by: SURGERY

## 2024-02-27 PROCEDURE — 25010000002 PROPOFOL 200 MG/20ML EMULSION: Performed by: NURSE ANESTHETIST, CERTIFIED REGISTERED

## 2024-02-27 PROCEDURE — 82948 REAGENT STRIP/BLOOD GLUCOSE: CPT

## 2024-02-27 PROCEDURE — 25010000002 FENTANYL CITRATE (PF) 50 MCG/ML SOLUTION: Performed by: NURSE ANESTHETIST, CERTIFIED REGISTERED

## 2024-02-27 PROCEDURE — 88305 TISSUE EXAM BY PATHOLOGIST: CPT | Performed by: SURGERY

## 2024-02-27 PROCEDURE — 25010000002 ONDANSETRON PER 1 MG: Performed by: NURSE ANESTHETIST, CERTIFIED REGISTERED

## 2024-02-27 PROCEDURE — 25010000002 LABETALOL 5 MG/ML SOLUTION: Performed by: NURSE ANESTHETIST, CERTIFIED REGISTERED

## 2024-02-27 PROCEDURE — 49592 RPR AA HRN 1ST < 3 NCR/STRN: CPT | Performed by: SURGERY

## 2024-02-27 PROCEDURE — S0260 H&P FOR SURGERY: HCPCS | Performed by: SURGERY

## 2024-02-27 PROCEDURE — 25810000003 LACTATED RINGERS PER 1000 ML: Performed by: ANESTHESIOLOGY

## 2024-02-27 PROCEDURE — 25010000002 HYDROMORPHONE PER 4 MG: Performed by: NURSE ANESTHETIST, CERTIFIED REGISTERED

## 2024-02-27 PROCEDURE — 49592 RPR AA HRN 1ST < 3 NCR/STRN: CPT | Performed by: SPECIALIST/TECHNOLOGIST, OTHER

## 2024-02-27 PROCEDURE — 25010000002 KETOROLAC TROMETHAMINE PER 15 MG: Performed by: NURSE ANESTHETIST, CERTIFIED REGISTERED

## 2024-02-27 PROCEDURE — 25010000002 SUGAMMADEX 200 MG/2ML SOLUTION: Performed by: NURSE ANESTHETIST, CERTIFIED REGISTERED

## 2024-02-27 PROCEDURE — 25010000002 CEFAZOLIN IN DEXTROSE 2000 MG/ 100 ML SOLUTION: Performed by: SURGERY

## 2024-02-27 PROCEDURE — 25010000002 HYDROMORPHONE 1 MG/ML SOLUTION: Performed by: NURSE ANESTHETIST, CERTIFIED REGISTERED

## 2024-02-27 DEVICE — IMPLANTABLE DEVICE: Type: IMPLANTABLE DEVICE | Site: ABDOMEN | Status: FUNCTIONAL

## 2024-02-27 RX ORDER — PROMETHAZINE HYDROCHLORIDE 25 MG/1
25 TABLET ORAL ONCE AS NEEDED
Status: DISCONTINUED | OUTPATIENT
Start: 2024-02-27 | End: 2024-02-27 | Stop reason: HOSPADM

## 2024-02-27 RX ORDER — OXYCODONE AND ACETAMINOPHEN 7.5; 325 MG/1; MG/1
1 TABLET ORAL EVERY 4 HOURS PRN
Status: DISCONTINUED | OUTPATIENT
Start: 2024-02-27 | End: 2024-02-27 | Stop reason: HOSPADM

## 2024-02-27 RX ORDER — SODIUM CHLORIDE 0.9 % (FLUSH) 0.9 %
3 SYRINGE (ML) INJECTION EVERY 12 HOURS SCHEDULED
Status: DISCONTINUED | OUTPATIENT
Start: 2024-02-27 | End: 2024-02-27 | Stop reason: HOSPADM

## 2024-02-27 RX ORDER — LABETALOL HYDROCHLORIDE 5 MG/ML
INJECTION, SOLUTION INTRAVENOUS AS NEEDED
Status: DISCONTINUED | OUTPATIENT
Start: 2024-02-27 | End: 2024-02-27 | Stop reason: SURG

## 2024-02-27 RX ORDER — MIDAZOLAM HYDROCHLORIDE 1 MG/ML
1 INJECTION INTRAMUSCULAR; INTRAVENOUS
Status: DISCONTINUED | OUTPATIENT
Start: 2024-02-27 | End: 2024-02-27 | Stop reason: HOSPADM

## 2024-02-27 RX ORDER — DROPERIDOL 2.5 MG/ML
0.62 INJECTION, SOLUTION INTRAMUSCULAR; INTRAVENOUS
Status: DISCONTINUED | OUTPATIENT
Start: 2024-02-27 | End: 2024-02-27 | Stop reason: HOSPADM

## 2024-02-27 RX ORDER — PROMETHAZINE HYDROCHLORIDE 25 MG/1
25 SUPPOSITORY RECTAL ONCE AS NEEDED
Status: DISCONTINUED | OUTPATIENT
Start: 2024-02-27 | End: 2024-02-27 | Stop reason: HOSPADM

## 2024-02-27 RX ORDER — HYDROMORPHONE HYDROCHLORIDE 1 MG/ML
0.5 INJECTION, SOLUTION INTRAMUSCULAR; INTRAVENOUS; SUBCUTANEOUS
Status: DISCONTINUED | OUTPATIENT
Start: 2024-02-27 | End: 2024-02-27 | Stop reason: HOSPADM

## 2024-02-27 RX ORDER — HYDROCODONE BITARTRATE AND ACETAMINOPHEN 5; 325 MG/1; MG/1
1 TABLET ORAL ONCE AS NEEDED
Status: DISCONTINUED | OUTPATIENT
Start: 2024-02-27 | End: 2024-02-27 | Stop reason: HOSPADM

## 2024-02-27 RX ORDER — FLUMAZENIL 0.1 MG/ML
0.2 INJECTION INTRAVENOUS AS NEEDED
Status: DISCONTINUED | OUTPATIENT
Start: 2024-02-27 | End: 2024-02-27 | Stop reason: HOSPADM

## 2024-02-27 RX ORDER — ROCURONIUM BROMIDE 10 MG/ML
INJECTION, SOLUTION INTRAVENOUS AS NEEDED
Status: DISCONTINUED | OUTPATIENT
Start: 2024-02-27 | End: 2024-02-27 | Stop reason: SURG

## 2024-02-27 RX ORDER — CEFAZOLIN SODIUM 2 G/100ML
2000 INJECTION, SOLUTION INTRAVENOUS
Status: COMPLETED | OUTPATIENT
Start: 2024-02-27 | End: 2024-02-27

## 2024-02-27 RX ORDER — FAMOTIDINE 10 MG/ML
20 INJECTION, SOLUTION INTRAVENOUS ONCE
Status: COMPLETED | OUTPATIENT
Start: 2024-02-27 | End: 2024-02-27

## 2024-02-27 RX ORDER — DIPHENHYDRAMINE HYDROCHLORIDE 50 MG/ML
12.5 INJECTION INTRAMUSCULAR; INTRAVENOUS
Status: DISCONTINUED | OUTPATIENT
Start: 2024-02-27 | End: 2024-02-27 | Stop reason: HOSPADM

## 2024-02-27 RX ORDER — HYDRALAZINE HYDROCHLORIDE 20 MG/ML
5 INJECTION INTRAMUSCULAR; INTRAVENOUS
Status: DISCONTINUED | OUTPATIENT
Start: 2024-02-27 | End: 2024-02-27 | Stop reason: HOSPADM

## 2024-02-27 RX ORDER — MAGNESIUM HYDROXIDE 1200 MG/15ML
LIQUID ORAL AS NEEDED
Status: DISCONTINUED | OUTPATIENT
Start: 2024-02-27 | End: 2024-02-27 | Stop reason: HOSPADM

## 2024-02-27 RX ORDER — EPHEDRINE SULFATE 50 MG/ML
5 INJECTION, SOLUTION INTRAVENOUS ONCE AS NEEDED
Status: DISCONTINUED | OUTPATIENT
Start: 2024-02-27 | End: 2024-02-27 | Stop reason: HOSPADM

## 2024-02-27 RX ORDER — HYDROCODONE BITARTRATE AND ACETAMINOPHEN 5; 325 MG/1; MG/1
1 TABLET ORAL EVERY 6 HOURS PRN
Qty: 20 TABLET | Refills: 0 | Status: SHIPPED | OUTPATIENT
Start: 2024-02-27

## 2024-02-27 RX ORDER — FENTANYL CITRATE 50 UG/ML
50 INJECTION, SOLUTION INTRAMUSCULAR; INTRAVENOUS ONCE AS NEEDED
Status: DISCONTINUED | OUTPATIENT
Start: 2024-02-27 | End: 2024-02-27 | Stop reason: HOSPADM

## 2024-02-27 RX ORDER — LABETALOL HYDROCHLORIDE 5 MG/ML
5 INJECTION, SOLUTION INTRAVENOUS
Status: DISCONTINUED | OUTPATIENT
Start: 2024-02-27 | End: 2024-02-27 | Stop reason: HOSPADM

## 2024-02-27 RX ORDER — FENTANYL CITRATE 50 UG/ML
INJECTION, SOLUTION INTRAMUSCULAR; INTRAVENOUS AS NEEDED
Status: DISCONTINUED | OUTPATIENT
Start: 2024-02-27 | End: 2024-02-27 | Stop reason: SURG

## 2024-02-27 RX ORDER — IPRATROPIUM BROMIDE AND ALBUTEROL SULFATE 2.5; .5 MG/3ML; MG/3ML
3 SOLUTION RESPIRATORY (INHALATION) ONCE AS NEEDED
Status: DISCONTINUED | OUTPATIENT
Start: 2024-02-27 | End: 2024-02-27 | Stop reason: HOSPADM

## 2024-02-27 RX ORDER — LIDOCAINE HYDROCHLORIDE 20 MG/ML
INJECTION, SOLUTION INFILTRATION; PERINEURAL AS NEEDED
Status: DISCONTINUED | OUTPATIENT
Start: 2024-02-27 | End: 2024-02-27 | Stop reason: SURG

## 2024-02-27 RX ORDER — LIDOCAINE HYDROCHLORIDE 10 MG/ML
0.5 INJECTION, SOLUTION INFILTRATION; PERINEURAL ONCE AS NEEDED
Status: DISCONTINUED | OUTPATIENT
Start: 2024-02-27 | End: 2024-02-27 | Stop reason: HOSPADM

## 2024-02-27 RX ORDER — PROPOFOL 10 MG/ML
INJECTION, EMULSION INTRAVENOUS AS NEEDED
Status: DISCONTINUED | OUTPATIENT
Start: 2024-02-27 | End: 2024-02-27 | Stop reason: SURG

## 2024-02-27 RX ORDER — SODIUM CHLORIDE 0.9 % (FLUSH) 0.9 %
3-10 SYRINGE (ML) INJECTION AS NEEDED
Status: DISCONTINUED | OUTPATIENT
Start: 2024-02-27 | End: 2024-02-27 | Stop reason: HOSPADM

## 2024-02-27 RX ORDER — ONDANSETRON 2 MG/ML
INJECTION INTRAMUSCULAR; INTRAVENOUS AS NEEDED
Status: DISCONTINUED | OUTPATIENT
Start: 2024-02-27 | End: 2024-02-27 | Stop reason: SURG

## 2024-02-27 RX ORDER — KETOROLAC TROMETHAMINE 30 MG/ML
INJECTION, SOLUTION INTRAMUSCULAR; INTRAVENOUS AS NEEDED
Status: DISCONTINUED | OUTPATIENT
Start: 2024-02-27 | End: 2024-02-27 | Stop reason: SURG

## 2024-02-27 RX ORDER — ONDANSETRON 2 MG/ML
4 INJECTION INTRAMUSCULAR; INTRAVENOUS ONCE AS NEEDED
Status: COMPLETED | OUTPATIENT
Start: 2024-02-27 | End: 2024-02-27

## 2024-02-27 RX ORDER — FENTANYL CITRATE 50 UG/ML
50 INJECTION, SOLUTION INTRAMUSCULAR; INTRAVENOUS
Status: DISCONTINUED | OUTPATIENT
Start: 2024-02-27 | End: 2024-02-27 | Stop reason: HOSPADM

## 2024-02-27 RX ORDER — SODIUM CHLORIDE, SODIUM LACTATE, POTASSIUM CHLORIDE, CALCIUM CHLORIDE 600; 310; 30; 20 MG/100ML; MG/100ML; MG/100ML; MG/100ML
9 INJECTION, SOLUTION INTRAVENOUS CONTINUOUS
Status: DISCONTINUED | OUTPATIENT
Start: 2024-02-27 | End: 2024-02-27 | Stop reason: HOSPADM

## 2024-02-27 RX ORDER — NALOXONE HCL 0.4 MG/ML
0.2 VIAL (ML) INJECTION AS NEEDED
Status: DISCONTINUED | OUTPATIENT
Start: 2024-02-27 | End: 2024-02-27 | Stop reason: HOSPADM

## 2024-02-27 RX ORDER — BUPIVACAINE HYDROCHLORIDE AND EPINEPHRINE 2.5; 5 MG/ML; UG/ML
INJECTION, SOLUTION EPIDURAL; INFILTRATION; INTRACAUDAL; PERINEURAL AS NEEDED
Status: DISCONTINUED | OUTPATIENT
Start: 2024-02-27 | End: 2024-02-27 | Stop reason: HOSPADM

## 2024-02-27 RX ADMIN — FENTANYL CITRATE 50 MCG: 50 INJECTION, SOLUTION INTRAMUSCULAR; INTRAVENOUS at 09:16

## 2024-02-27 RX ADMIN — FENTANYL CITRATE 50 MCG: 50 INJECTION, SOLUTION INTRAMUSCULAR; INTRAVENOUS at 08:02

## 2024-02-27 RX ADMIN — ONDANSETRON 4 MG: 2 INJECTION INTRAMUSCULAR; INTRAVENOUS at 08:58

## 2024-02-27 RX ADMIN — HYDROMORPHONE HYDROCHLORIDE 0.5 MG: 1 INJECTION, SOLUTION INTRAMUSCULAR; INTRAVENOUS; SUBCUTANEOUS at 09:45

## 2024-02-27 RX ADMIN — CEFAZOLIN SODIUM 2 G: 2 INJECTION, SOLUTION INTRAVENOUS at 08:12

## 2024-02-27 RX ADMIN — LABETALOL HYDROCHLORIDE 5 MG: 5 INJECTION, SOLUTION INTRAVENOUS at 09:23

## 2024-02-27 RX ADMIN — FENTANYL CITRATE 50 MCG: 50 INJECTION, SOLUTION INTRAMUSCULAR; INTRAVENOUS at 09:32

## 2024-02-27 RX ADMIN — OXYCODONE HYDROCHLORIDE AND ACETAMINOPHEN 1 TABLET: 7.5; 325 TABLET ORAL at 09:32

## 2024-02-27 RX ADMIN — HYDROMORPHONE HYDROCHLORIDE 0.5 MG: 1 INJECTION, SOLUTION INTRAMUSCULAR; INTRAVENOUS; SUBCUTANEOUS at 08:23

## 2024-02-27 RX ADMIN — LABETALOL HYDROCHLORIDE 5 MG: 5 INJECTION, SOLUTION INTRAVENOUS at 09:14

## 2024-02-27 RX ADMIN — HYDROMORPHONE HYDROCHLORIDE 0.5 MG: 1 INJECTION, SOLUTION INTRAMUSCULAR; INTRAVENOUS; SUBCUTANEOUS at 09:52

## 2024-02-27 RX ADMIN — LABETALOL HYDROCHLORIDE 5 MG: 5 INJECTION, SOLUTION INTRAVENOUS at 08:46

## 2024-02-27 RX ADMIN — HYDROMORPHONE HYDROCHLORIDE 0.5 MG: 1 INJECTION, SOLUTION INTRAMUSCULAR; INTRAVENOUS; SUBCUTANEOUS at 10:03

## 2024-02-27 RX ADMIN — FENTANYL CITRATE 50 MCG: 50 INJECTION, SOLUTION INTRAMUSCULAR; INTRAVENOUS at 09:22

## 2024-02-27 RX ADMIN — SODIUM CHLORIDE, POTASSIUM CHLORIDE, SODIUM LACTATE AND CALCIUM CHLORIDE 9 ML/HR: 600; 310; 30; 20 INJECTION, SOLUTION INTRAVENOUS at 06:59

## 2024-02-27 RX ADMIN — SUGAMMADEX 400 MG: 100 INJECTION, SOLUTION INTRAVENOUS at 08:58

## 2024-02-27 RX ADMIN — PROPOFOL 50 MG: 10 INJECTION, EMULSION INTRAVENOUS at 09:01

## 2024-02-27 RX ADMIN — KETOROLAC TROMETHAMINE 30 MG: 30 INJECTION, SOLUTION INTRAMUSCULAR; INTRAVENOUS at 08:58

## 2024-02-27 RX ADMIN — FENTANYL CITRATE 50 MCG: 50 INJECTION, SOLUTION INTRAMUSCULAR; INTRAVENOUS at 09:47

## 2024-02-27 RX ADMIN — SODIUM CHLORIDE, POTASSIUM CHLORIDE, SODIUM LACTATE AND CALCIUM CHLORIDE: 600; 310; 30; 20 INJECTION, SOLUTION INTRAVENOUS at 08:39

## 2024-02-27 RX ADMIN — LABETALOL HYDROCHLORIDE 5 MG: 5 INJECTION, SOLUTION INTRAVENOUS at 09:46

## 2024-02-27 RX ADMIN — ROCURONIUM BROMIDE 20 MG: 10 INJECTION, SOLUTION INTRAVENOUS at 08:41

## 2024-02-27 RX ADMIN — LIDOCAINE HYDROCHLORIDE 100 MG: 20 INJECTION, SOLUTION INFILTRATION; PERINEURAL at 08:06

## 2024-02-27 RX ADMIN — LABETALOL HYDROCHLORIDE 5 MG: 5 INJECTION, SOLUTION INTRAVENOUS at 08:58

## 2024-02-27 RX ADMIN — FAMOTIDINE 20 MG: 10 INJECTION INTRAVENOUS at 07:01

## 2024-02-27 RX ADMIN — HYDROMORPHONE HYDROCHLORIDE 0.5 MG: 1 INJECTION, SOLUTION INTRAMUSCULAR; INTRAVENOUS; SUBCUTANEOUS at 08:26

## 2024-02-27 RX ADMIN — PROPOFOL 250 MG: 10 INJECTION, EMULSION INTRAVENOUS at 08:06

## 2024-02-27 RX ADMIN — ROCURONIUM BROMIDE 50 MG: 10 INJECTION, SOLUTION INTRAVENOUS at 08:06

## 2024-02-27 RX ADMIN — ONDANSETRON 4 MG: 2 INJECTION INTRAMUSCULAR; INTRAVENOUS at 09:32

## 2024-02-27 NOTE — ANESTHESIA PREPROCEDURE EVALUATION
Anesthesia Evaluation     no history of anesthetic complications:                Airway   Mallampati: II  TM distance: >3 FB  Neck ROM: full  Large neck circumference  Dental - normal exam     Pulmonary    (+) ,shortness of breath, recent URI, sleep apnea  (-) asthma, not a smoker  Cardiovascular     (+) hypertension, hyperlipidemia  (-) dysrhythmias, angina    ROS comment: NSR , Stress low risk    Neuro/Psych  GI/Hepatic/Renal/Endo    (+) morbid obesity, hiatal hernia, GERD, diabetes mellitus  (-) liver disease, no renal disease    Musculoskeletal     Abdominal    Substance History      OB/GYN          Other                    Anesthesia Plan    ASA 3     general     intravenous induction     Anesthetic plan, risks, benefits, and alternatives have been provided, discussed and informed consent has been obtained with: patient.    CODE STATUS:

## 2024-02-27 NOTE — OP NOTE
Operative Note :   MD Jamie Harris Jose Armando  1975    Procedure Date: 02/27/24    Pre-op Diagnosis:  Umbilical hernia without obstruction and without gangrene [K42.9]    Post-Op Diagnosis:  Same    Procedure:   Open umbilical hernia    Surgeon: Aaron Correa MD    Assistant: Oz Mcnulty CSA was responsible for performing the following activities: Retraction, Irrigation, Suturing, Closing, and Placing Dressing and their skilled assistance was necessary for the success of this case.    Anesthesia:  General (general endotracheal tube)    EBL:   minimal    Specimens:   Incarcerated omentum    Indications:  48-year-old gentleman with an initial incarcerated umbilical hernia    Findings:   Fascial defect of approximately 2 cm with incarcerated viable omentum      Description of procedure:    After obtaining informed consent, the patient was brought to the operating room and placed under a general anesthetic.  His abdomen was clipped and then sterilely prepped and draped.  Infraumbilical curvilinear incision was made dissected through the skin and subcutaneous tissues.  I dissected down through subcutaneous tissues onto the inferior aspect of the hernia sac and then down the hernia sac onto the inferior aspect of the fascia.  I then began dissecting circumferentially  the hernia sac from the surrounding subcutaneous tissues and  the hernia sac from the umbilical skin.  Once this was circumferentially dissected I came down to the fascia circumferentially.  The hernia was not reducible.  The total size of the fascial defect was only about 2 cm, but the hernia sac itself was approximately 6 cm in diameter.  I opened the hernia sac and elected to resect a portion of the omentum using clamps and ties.  The remaining omentum was able to be reduced.  The hernia sac was used to develop the preperitoneal space circumferentially and the sac was then closed with 3-0 Vicryl.  I then  introduced the 6.4 cm Bard Ventralex patch into the preperitoneal space.  The mesh was secured to the fascia with 0 Ethibond using the mesh straps.  The straps were then cut away and the fascia was closed in transverse fashion over the mesh incorporating bites of the lip of the straps using 0 Ethibond sutures.  The subcutaneous space was irrigated.  The subcutaneous space was then closed with 3-0 Vicryl sutures and the skin was closed with a running 4-0 Monocryl subcuticular.  Sterile dressings were placed.  The patient tolerated this well.    Aaron Correa MD  General and Endoscopic Surgery  [facility]    [address], [address]  [place], [place], [address]  P: [phone]  F: [phone]

## 2024-02-27 NOTE — H&P
Cc: Umbilical hernia     History of presenting illness:   This is a nice 48-year-old obese gentleman with an umbilical hernia present for greater than a year.  Over the last couple of months its become larger.  With direct pressure he has a fair amount of discomfort associated with this.  There is no radiation of pain.  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     Past Medical History: Diabetes, obesity, hypertension, acid reflux, hypercholesterolemia     Past Surgical History: Significant for appendectomy done around 20 years ago laparoscopically.  He had what sounds like bilateral open inguinal hernia repairs also done around 20 years ago.  Colonoscopy with polypectomy done April 2023 along with upper endoscopy.     Medications: Includes Mounjaro, omeprazole, Crestor, insulin, Farxiga, cetirizine     Allergies: Morphine caused a rash     Social History: He is a non-smoker.  His job does require some physical activity.     Family History: Negative for known colon or rectal cancer     Review of Systems:  Constitutional: Denies fever, chills or change in weight  Neck: no swollen glands or dysphagia or odynophagia  Respiratory: negative for SOB, cough, hemoptysis or wheezing  Cardiovascular: negative for chest pain, palpitations or peripheral edema  Gastrointestinal: Positive for periumbilical pain, reflux, denies vomiting        Physical Exam:  BMI: 38.6  General: alert and oriented, appropriate, no acute distress  Eyes: No scleral icterus, extraocular movements are intact  Neck: Supple without lymphadenopathy or thyromegaly, trachea is in the midline  Respiratory: There is good bilateral chest expansion, no use of accessory muscles is noted  Cardiovascular: No jugular venous distention or peripheral edema is seen  Gastrointestinal: Obese but soft.  There is an incarcerated umbilical hernia which I am not able to reduce, containing omentum.   There is mild tenderness.  There is no overlying skin change.  I am not able to detect the size of the fascial defect.     Laboratory data: unremarkable     Imaging data: No recent data, but CT of the abdomen and pelvis from 2019 does demonstrate an umbilical hernia containing only omental fat, at that time the fascial defect is estimated at about 1.5 cm.        Assessment and plan:   -Incarcerated umbilical hernia  -Type 2 diabetes and obesity complicating above  -Options discussed with patient.  I have recommended proceeding with open umbilical hernia repair, likely with mesh placement.  -He understands that he will need to take at least 4 weeks, perhaps up to 6 weeks off after surgery in order to allow this to heal given the physical nature of his work  -Risks including bleeding, infection, recurrence and pain discussed, patient is agreeable to proceed.        Aaron Correa MD, FACS  General, Minimally Invasive and Endoscopic Surgery  [facility]     [address], [address]  [place], [place], [address]  P: [phone]  F: [phone]    History and physical copied, brought forward, pasted, edited and updated from prior office visit.

## 2024-02-27 NOTE — ANESTHESIA POSTPROCEDURE EVALUATION
Patient: Jamie Suárez    Procedure Summary       Date: 02/27/24 Room / Location: [facility] OR [address] [place] [address] MAIN OR    Anesthesia Start: 0757 Anesthesia Stop: 0916    Procedure: UMBILICAL HERNIA REPAIR with mesh (Abdomen) Diagnosis:       Umbilical hernia without obstruction and without gangrene      (Umbilical hernia without obstruction and without gangrene [K42.9])    Surgeons: Aaron Correa MD Provider: Jeanne Schofield MD    Anesthesia Type: general ASA Status: 3            Anesthesia Type: general    Vitals  Vitals Value Taken Time   /76 02/27/24 1015   Temp 36.5 °C (97.7 °F) 02/27/24 0910   Pulse 85 02/27/24 1019   Resp     SpO2 99 % 02/27/24 1019   Vitals shown include unfiled device data.        Post Anesthesia Care and Evaluation    Patient location during evaluation: PHASE II  Patient participation: complete - patient participated  Level of consciousness: awake and alert  Pain management: adequate    Airway patency: patent  Anesthetic complications: No anesthetic complications  PONV Status: none  Cardiovascular status: acceptable and hemodynamically stable  Respiratory status: acceptable, nonlabored ventilation and spontaneous ventilation  Hydration status: acceptable

## 2024-02-27 NOTE — ANESTHESIA PROCEDURE NOTES
Airway  Urgency: elective    Date/Time: 2/27/2024 8:11 AM  Airway not difficult    General Information and Staff    Patient location during procedure: OR  Anesthesiologist: Jeanne Schofield MD  CRNA/CAA: Sandy Brooks CRNA    Indications and Patient Condition  Indications for airway management: airway protection    Preoxygenated: yes  Mask difficulty assessment: 2 - vent by mask + OA or adjuvant +/- NMBA    Final Airway Details  Final airway type: endotracheal airway      Successful airway: ETT  Cuffed: yes   Successful intubation technique: direct laryngoscopy  Facilitating devices/methods: intubating stylet and anterior pressure/BURP  Endotracheal tube insertion site: oral  Blade: Valadez  Blade size: 2  ETT size (mm): 7.5  Cormack-Lehane Classification: grade IIb - view of arytenoids or posterior of glottis only  Placement verified by: chest auscultation and capnometry   Measured from: lips  ETT/EBT  to lips (cm): 22  Number of attempts at approach: 1  Assessment: lips, teeth, and gum same as pre-op and atraumatic intubation    Additional Comments  Atraumatic, MOP to cuff, BSBE, no change to dentition, secured with tape

## 2024-02-28 LAB
LAB AP CASE REPORT: NORMAL
PATH REPORT.FINAL DX SPEC: NORMAL
PATH REPORT.GROSS SPEC: NORMAL

## 2024-03-07 ENCOUNTER — OFFICE VISIT (OUTPATIENT)
Dept: SURGERY | Facility: CLINIC | Age: 49
End: 2024-03-07
Payer: COMMERCIAL

## 2024-03-07 VITALS
SYSTOLIC BLOOD PRESSURE: 110 MMHG | DIASTOLIC BLOOD PRESSURE: 80 MMHG | HEIGHT: 66 IN | WEIGHT: 238 LBS | BODY MASS INDEX: 38.25 KG/M2

## 2024-03-07 DIAGNOSIS — K42.9 UMBILICAL HERNIA WITHOUT OBSTRUCTION AND WITHOUT GANGRENE: Primary | ICD-10-CM

## 2024-03-07 PROCEDURE — 99024 POSTOP FOLLOW-UP VISIT: CPT | Performed by: SURGERY

## 2024-03-07 NOTE — PROGRESS NOTES
Postop open umbilical hernia repair with mesh    Subjective:  Overall doing all right.  He still is having some discomfort at the site although he is much better.    Objective:  BMI 38.4  General: Awake and alert, no distress  Abdomen: Soft, incision with some likely superficial necrosis.  There is a small amount of drainage and a blister above the incision which is draining serous material.  Perhaps some very mild erythema that does not duke, seems more reactive rather than infectious.    Assessment and plan:  -Status post open umbilical hernia repair with mesh  -Multiple high risk factors including obesity, diabetes  -Cleanse daily and apply bacitracin  -Follow-up here 2 weeks  -Tentatively plan to return to work at 6 weeks postop.    Aaron Correa MD  General and Endoscopic Surgery  [facility]    [address], [address]  [place], [place], [address]  P: [phone]  F: [phone]

## 2024-03-07 NOTE — PROGRESS NOTES
Subjective   Jamie Suárez is a 48 y.o. male.     CC: Hospital F/U for Hernia Repair    History of Present Illness     Patient comes in today after being seen at [facility] on 2/27/2024 for an umbilical hernia repair.  That visit was as follows:    History of presenting illness:   This is a nice 48-year-old obese gentleman with an umbilical hernia present for greater than a year.  Over the last couple of months its become larger.  With direct pressure he has a fair amount of discomfort associated with this.  There is no radiation of pain.  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    Current outpatient and discharge medications have been reconciled for the patient.  Reviewed by: Harrison Broderick MD    Patient saw his surgeon in follow-up yesterday with this note:    Subjective:  Overall doing all right.  He still is having some discomfort at the site although he is much better.    Assessment and plan:  -Status post open umbilical hernia repair with mesh  -Multiple high risk factors including obesity, diabetes  -Cleanse daily and apply bacitracin  -Follow-up here 2 weeks  -Tentatively plan to return to work at 6 weeks postop.    Today, patient reports a good amount of pain at the site, not responding to the 5 mg hydrocodone that he was given at his surgical appointment.  Denies fever/chills.  Is using over-the-counter Neosporin on the area.  Patient does have an appointment on 3/21/2024 with his surgeon in follow-up.  He is seeing his endocrinologist this week.  Patient reports moving his bowels well.        The following portions of the patient's history were reviewed and updated as appropriate: allergies, current medications, past family history, past medical history, past social history, past surgical history, and problem list.    Review of Systems   Constitutional:  Negative for activity change, chills and fever.   Respiratory:  Negative  "for cough.    Cardiovascular:  Negative for chest pain.   Psychiatric/Behavioral:  Negative for dysphoric mood.        /91   Pulse 96   Temp 98.6 °F (37 °C) (Oral)   Resp 18   Ht 167.6 cm (66\")   Wt 108 kg (238 lb)   BMI 38.41 kg/m²     Objective   Physical Exam  Constitutional:       General: He is not in acute distress.     Appearance: He is well-developed.   Cardiovascular:      Rate and Rhythm: Normal rate and regular rhythm.   Pulmonary:      Effort: Pulmonary effort is normal.      Breath sounds: Normal breath sounds.   Neurological:      Mental Status: He is alert and oriented to person, place, and time.   Psychiatric:         Behavior: Behavior normal.         Thought Content: Thought content normal.     Hospital records reviewed with pt confirming HPI.      Assessment & Plan   Diagnoses and all orders for this visit:    1. Umbilical hernia without obstruction and without gangrene (Primary)  -     oxyCODONE-acetaminophen (Percocet) 7.5-325 MG per tablet; Take 1 tablet by mouth Every 6 (Six) Hours As Needed for Moderate Pain.  Dispense: 12 tablet; Refill: 0    2. Status post umbilical hernia repair, follow-up exam    3. Hospital discharge follow-up    Will give patient a very short supply of Percocet to replace the Norco due to discomfort.  Patient asked to stop the Neosporin and change to bacitracin as Neosporin can cause irritation of the wound site and 30% of people.  Patient to continue treating the wound as his surgeon has instructed him to keep his appointment with the surgeon next week.             "

## 2024-03-11 ENCOUNTER — OFFICE VISIT (OUTPATIENT)
Dept: FAMILY MEDICINE CLINIC | Facility: CLINIC | Age: 49
End: 2024-03-11
Payer: COMMERCIAL

## 2024-03-11 VITALS
RESPIRATION RATE: 18 BRPM | DIASTOLIC BLOOD PRESSURE: 91 MMHG | HEIGHT: 66 IN | BODY MASS INDEX: 38.25 KG/M2 | HEART RATE: 96 BPM | TEMPERATURE: 98.6 F | SYSTOLIC BLOOD PRESSURE: 134 MMHG | WEIGHT: 238 LBS

## 2024-03-11 DIAGNOSIS — Z09 STATUS POST UMBILICAL HERNIA REPAIR, FOLLOW-UP EXAM: ICD-10-CM

## 2024-03-11 DIAGNOSIS — K42.9 UMBILICAL HERNIA WITHOUT OBSTRUCTION AND WITHOUT GANGRENE: Primary | ICD-10-CM

## 2024-03-11 DIAGNOSIS — Z09 HOSPITAL DISCHARGE FOLLOW-UP: ICD-10-CM

## 2024-03-11 PROCEDURE — 99213 OFFICE O/P EST LOW 20 MIN: CPT | Performed by: FAMILY MEDICINE

## 2024-03-11 RX ORDER — OXYCODONE AND ACETAMINOPHEN 7.5; 325 MG/1; MG/1
1 TABLET ORAL EVERY 6 HOURS PRN
Qty: 12 TABLET | Refills: 0 | Status: SHIPPED | OUTPATIENT
Start: 2024-03-11

## 2024-03-13 ENCOUNTER — OFFICE VISIT (OUTPATIENT)
Dept: ENDOCRINOLOGY | Age: 49
End: 2024-03-13
Payer: COMMERCIAL

## 2024-03-13 VITALS
HEIGHT: 66 IN | HEART RATE: 91 BPM | DIASTOLIC BLOOD PRESSURE: 98 MMHG | WEIGHT: 242.6 LBS | OXYGEN SATURATION: 98 % | BODY MASS INDEX: 38.99 KG/M2 | SYSTOLIC BLOOD PRESSURE: 144 MMHG

## 2024-03-13 DIAGNOSIS — E11.65 TYPE 2 DIABETES MELLITUS WITH HYPERGLYCEMIA, WITHOUT LONG-TERM CURRENT USE OF INSULIN: Primary | ICD-10-CM

## 2024-03-13 DIAGNOSIS — E78.2 MIXED HYPERLIPIDEMIA: ICD-10-CM

## 2024-03-13 DIAGNOSIS — E66.01 CLASS 2 SEVERE OBESITY DUE TO EXCESS CALORIES WITH SERIOUS COMORBIDITY AND BODY MASS INDEX (BMI) OF 39.0 TO 39.9 IN ADULT: ICD-10-CM

## 2024-03-13 LAB
BUN SERPL-MCNC: 16 MG/DL (ref 6–20)
BUN/CREAT SERPL: 14.3 (ref 7–25)
CALCIUM SERPL-MCNC: 9.2 MG/DL (ref 8.6–10.5)
CHLORIDE SERPL-SCNC: 105 MMOL/L (ref 98–107)
CHOLEST SERPL-MCNC: 131 MG/DL (ref 0–200)
CO2 SERPL-SCNC: 22.7 MMOL/L (ref 22–29)
CREAT SERPL-MCNC: 1.12 MG/DL (ref 0.76–1.27)
EGFRCR SERPLBLD CKD-EPI 2021: 80.5 ML/MIN/1.73
GLUCOSE SERPL-MCNC: 133 MG/DL (ref 65–99)
HBA1C MFR BLD: 6.7 % (ref 4.8–5.6)
HDLC SERPL-MCNC: 53 MG/DL (ref 40–60)
IMP & REVIEW OF LAB RESULTS: NORMAL
LDLC SERPL CALC-MCNC: 59 MG/DL (ref 0–100)
POTASSIUM SERPL-SCNC: 4.8 MMOL/L (ref 3.5–5.2)
SODIUM SERPL-SCNC: 139 MMOL/L (ref 136–145)
TRIGL SERPL-MCNC: 103 MG/DL (ref 0–150)
VLDLC SERPL CALC-MCNC: 19 MG/DL (ref 5–40)

## 2024-03-13 PROCEDURE — 99214 OFFICE O/P EST MOD 30 MIN: CPT | Performed by: NURSE PRACTITIONER

## 2024-03-13 RX ORDER — DAPAGLIFLOZIN 10 MG/1
1 TABLET, FILM COATED ORAL DAILY
Qty: 90 TABLET | Refills: 1 | Status: SHIPPED | OUTPATIENT
Start: 2024-03-13

## 2024-03-13 RX ORDER — LOSARTAN POTASSIUM 50 MG/1
50 TABLET ORAL DAILY
COMMUNITY

## 2024-03-13 RX ORDER — TIRZEPATIDE 10 MG/.5ML
10 INJECTION, SOLUTION SUBCUTANEOUS WEEKLY
Qty: 6 ML | Refills: 1 | Status: SHIPPED | OUTPATIENT
Start: 2024-03-13

## 2024-03-13 RX ORDER — ROSUVASTATIN CALCIUM 10 MG/1
10 TABLET, COATED ORAL DAILY
COMMUNITY

## 2024-03-13 NOTE — PROGRESS NOTES
"Chief Complaint  Diabetes (Type 2.)    Subjective        Jamie Suárez presents to [facility] ENDOCRINOLOGY  History of Present Illness    Had hernia repair surgery a few weeks ago 2/27/2024    Type 2 dm, < 5 years    DM regimen: Mounjaro 10 mg weekly (had been off for 2 weeks leading up to hernia repair surgery), metformin at 1000 mg once daily, farxiga 10mg daily   Reports BS running in the 110s   Denies diabetic foot concerns  Denies CP and JONES   Drinkning more water and less sugary drinks   HLP, mixed: statin   Renal: losartan     Objective   Vital Signs:  /98 (BP Location: Left arm, Patient Position: Sitting)   Pulse 91   Ht 167.6 cm (65.98\")   Wt 110 kg (242 lb 9.6 oz)   SpO2 98%   BMI 39.18 kg/m²   Estimated body mass index is 39.18 kg/m² as calculated from the following:    Height as of this encounter: 167.6 cm (65.98\").    Weight as of this encounter: 110 kg (242 lb 9.6 oz).           Physical Exam  Vitals reviewed.   Constitutional:       General: He is not in acute distress.  HENT:      Head: Normocephalic and atraumatic.   Cardiovascular:      Rate and Rhythm: Normal rate and regular rhythm.   Pulmonary:      Effort: Pulmonary effort is normal. No respiratory distress.   Abdominal:      Tenderness: There is abdominal tenderness.      Hernia: A hernia is present.      Comments: Surgical site   Musculoskeletal:         General: No signs of injury. Normal range of motion.      Cervical back: Normal range of motion and neck supple.   Skin:     General: Skin is warm and dry.   Neurological:      Mental Status: He is alert and oriented to person, place, and time. Mental status is at baseline.   Psychiatric:         Mood and Affect: Mood normal.         Behavior: Behavior normal.         Thought Content: Thought content normal.         Judgment: Judgment normal.          Result Review :    The following data was reviewed by: KAYLA Cifuentes on 03/13/2024:  Common labs          " 8/11/2023    04:00 12/1/2023    22:53 2/21/2024    07:14   Common Labs   Glucose  196  105    BUN  20  17    Creatinine  1.25  1.28    Sodium  135  142    Potassium  3.7  4.0    Chloride  104  107    Calcium  8.8  8.7    Albumin  3.7     Total Bilirubin  0.3     Alkaline Phosphatase  71     AST (SGOT)  23     ALT (SGPT)  35     WBC  10.77  10.02    Hemoglobin  18.3  17.9    Hematocrit  54.5  53.6    Platelets  217  252    Hemoglobin A1C 6.0            Details          This result is from an external source.                          Assessment and Plan     Diagnoses and all orders for this visit:    1. Type 2 diabetes mellitus with hyperglycemia, without long-term current use of insulin (Primary)  -     Tirzepatide (Mounjaro) 10 MG/0.5ML solution pen-injector pen; Inject 0.5 mL under the skin into the appropriate area as directed 1 (One) Time Per Week.  Dispense: 6 mL; Refill: 1  -     metFORMIN (GLUCOPHAGE) 500 MG tablet; Take 2 tablets by mouth Daily With Breakfast.  Dispense: 180 tablet; Refill: 1  -     dapagliflozin Propanediol (Farxiga) 10 MG tablet; Take 10 mg by mouth Daily.  Dispense: 90 tablet; Refill: 1  -     Hemoglobin A1c  -     Basic Metabolic Panel  -     Lipid Panel    2. Mixed hyperlipidemia  -     Lipid Panel    3. Class 2 severe obesity due to excess calories with serious comorbidity and body mass index (BMI) of 39.0 to 39.9 in adult             Follow Up     Return in about 6 months (around 9/13/2024).    Labs today  No data to review  Will adjust above regimen as needed based on results     Patient was given instructions and counseling regarding his condition or for health maintenance advice. Please see specific information pulled into the AVS if appropriate.     KAYLA Cifuentes

## 2024-03-21 ENCOUNTER — TELEPHONE (OUTPATIENT)
Dept: SURGERY | Facility: CLINIC | Age: 49
End: 2024-03-21

## 2024-03-21 ENCOUNTER — OFFICE VISIT (OUTPATIENT)
Dept: SURGERY | Facility: CLINIC | Age: 49
End: 2024-03-21
Payer: COMMERCIAL

## 2024-03-21 VITALS
BODY MASS INDEX: 38.89 KG/M2 | WEIGHT: 242 LBS | DIASTOLIC BLOOD PRESSURE: 86 MMHG | HEIGHT: 66 IN | SYSTOLIC BLOOD PRESSURE: 140 MMHG

## 2024-03-21 DIAGNOSIS — K42.9 UMBILICAL HERNIA WITHOUT OBSTRUCTION AND WITHOUT GANGRENE: Primary | ICD-10-CM

## 2024-03-21 PROCEDURE — 99024 POSTOP FOLLOW-UP VISIT: CPT | Performed by: SURGERY

## 2024-03-21 RX ORDER — MAGNESIUM HYDROXIDE 1200 MG/15ML
1000 LIQUID ORAL ONCE
Qty: 1000 ML | Refills: 0 | Status: SHIPPED | OUTPATIENT
Start: 2024-03-21 | End: 2024-03-21

## 2024-03-21 NOTE — PROGRESS NOTES
Postop open umbilical hernia repair    Still with a  some very superficial necrosis of the skin, generally the. He does have some discomfort at the umbilicus.    Objective:  BMI 38.9  On exam, the erythema is much improved, but there is some evidence of superficial necrosis of the skin along the incision.  There is a probable seroma underneath this.    Assessment and plan:  -Superficial skin necrosis after umbilical hernia repair, likely related to underlying seroma  -At the bedside I debrided away a small amount of this scab and necrotic tissue with #15 blade and pickups.  There was a seroma underneath this.  I showed his friend how to manage the wound and we will packed this wound once daily with saline.  I do not see any indication for antibiotics.  The fascia appears to be intact without issue.    -Tentatively plan for return to work approximately 3 weeks from now, he will follow-up with me in the office next week to evaluate the progress of the wound.    \Aaron Correa MD  General and Endoscopic Surgery  [facility]    [address], [address]  [place], [place], [address]  P: [phone]  F: [phone]

## 2024-03-26 ENCOUNTER — TELEPHONE (OUTPATIENT)
Dept: SURGERY | Facility: CLINIC | Age: 49
End: 2024-03-26
Payer: COMMERCIAL

## 2024-03-26 NOTE — TELEPHONE ENCOUNTER
Called pt to inform that he's to keep the wound covered per Dr Correa but had to leave a message for the pt to call back. He's scheduled tomorrow for a wound check.

## 2024-03-26 NOTE — TELEPHONE ENCOUNTER
----- Message from Aaron Correa MD sent at 3/26/2024 12:24 PM EDT -----  Regarding: RE: wound visit  He should keep it covered.  ----- Message -----  From: Tess Rousseau RegSched Rep  Sent: 3/21/2024   3:55 PM EDT  To: Aaron Correa MD  Subject: wound visit                                      Pt was in the office this morning and had his wound dressing changed. He called saying that the dressing has came off & wanted to know if it's ok to leave it uncovered or does he need to cover it back up?    Tess Chacon

## 2024-03-27 ENCOUNTER — OFFICE VISIT (OUTPATIENT)
Dept: SURGERY | Facility: CLINIC | Age: 49
End: 2024-03-27
Payer: COMMERCIAL

## 2024-03-27 VITALS — HEIGHT: 66 IN | BODY MASS INDEX: 39.12 KG/M2 | WEIGHT: 243.4 LBS

## 2024-03-27 DIAGNOSIS — K42.9 UMBILICAL HERNIA WITHOUT OBSTRUCTION AND WITHOUT GANGRENE: Primary | ICD-10-CM

## 2024-03-27 PROCEDURE — 99024 POSTOP FOLLOW-UP VISIT: CPT | Performed by: SURGERY

## 2024-03-27 NOTE — PROGRESS NOTES
Postop open umbilical hernia repair    Subjective:  Feels better today, wound is closing.    Objective:  BMI 39.3  General: Awake and alert without distress  Abdomen: Wound is examined.  The inflammation below the umbilicus appears improved.  I probed the wound and its much shallower.  No additional fluid was elicited.    Assessment and plan:  -Status post open umbilical hernia repair, recovering well  -No sign of any active infection at this time  -Continue cleansing with peroxide twice daily  -Follow-up with me here 2 weeks, may be close to returning to work at that point    Aaron Correa MD  General and Endoscopic Surgery  [facility]    [address], [address]  [place], [place], [address]  P: [phone]  F: [phone]

## 2024-04-08 ENCOUNTER — PRIOR AUTHORIZATION (OUTPATIENT)
Dept: ENDOCRINOLOGY | Age: 49
End: 2024-04-08
Payer: COMMERCIAL

## 2024-04-08 RX ORDER — LOSARTAN POTASSIUM 50 MG/1
TABLET ORAL
Qty: 90 TABLET | OUTPATIENT
Start: 2024-04-08

## 2024-04-08 NOTE — TELEPHONE ENCOUNTER
A PA has been started for Mounjaro 10MG/0.5ML pen-injectors.  Key: [id number] - PA Case ID: [id number] - Rx #: [id number]

## 2024-04-10 ENCOUNTER — OFFICE VISIT (OUTPATIENT)
Dept: SURGERY | Facility: CLINIC | Age: 49
End: 2024-04-10
Payer: COMMERCIAL

## 2024-04-10 VITALS
HEIGHT: 66 IN | WEIGHT: 243.4 LBS | DIASTOLIC BLOOD PRESSURE: 84 MMHG | SYSTOLIC BLOOD PRESSURE: 136 MMHG | BODY MASS INDEX: 39.12 KG/M2

## 2024-04-10 DIAGNOSIS — K42.9 UMBILICAL HERNIA WITHOUT OBSTRUCTION AND WITHOUT GANGRENE: Primary | ICD-10-CM

## 2024-04-10 PROCEDURE — 99024 POSTOP FOLLOW-UP VISIT: CPT | Performed by: SURGERY

## 2024-04-10 NOTE — TELEPHONE ENCOUNTER
Called and spoke with pt, he stated that he just got a refill on the mounjaro, which may be why the denied because it is too soon for a refill.

## 2024-04-10 NOTE — PROGRESS NOTES
Follow-up open umbilical hernia repair with mesh, seroma    Subjective:  Feeling much better, discomfort is improved but he still does have some difficulty bending and squatting.  Minimal drainage at this point.    Objective:  Wound is examined and essentially all the depth of the wound has resolved.  I see no residual erythema and there is no obvious residual seroma.  There is still a small open area of the skin with a small amount of weeping and there is some scabbing.  No signs of infection or necrotic material.    Assessment and plan:  -Status post open umbilical hernia repair with mesh with subsequent formation of seroma, drainage of seroma and dressings  -Cleansings only to open portion  -Follow-up here in 2 weeks, he is not quite ready to get back to work yet but I anticipate at that time he will be, anticipate ready to return without restrictions at the end of this month.    Aaron Correa MD  General and Endoscopic Surgery  [facility]    [address], [address]  [place], [place], [address]  P: [phone]  F: [phone]

## 2024-04-23 ENCOUNTER — TELEPHONE (OUTPATIENT)
Dept: SURGERY | Facility: CLINIC | Age: 49
End: 2024-04-23
Payer: COMMERCIAL

## 2024-04-23 NOTE — TELEPHONE ENCOUNTER
Spoke with patient and informed him papers were completed and faxed earlier this afternoon.  He voiced understanding.

## 2024-04-23 NOTE — TELEPHONE ENCOUNTER
Caller: Jamie Suárez    Relationship: Self    Best call back number: [phone]  PT IS AVAILABLE AT ANYTIME, IF NO ANSWER A DETAILED MSG CAN BE     What form or medical record are you requesting: FMLA/SHORT TERM DIS    Who is requesting this form or medical record from you: [employer]    How would you like to receive the form or medical records (pick-up, mail, fax): FAX  If fax, what is the fax number: [phone]    Timeframe paperwork needed: ASAP    Additional notes: PT WAS TOLD LAST WEEK PAPERWORK HAD BEEN RECEIVED BY THE OFFICE. HE IS ASKING WHEN THE PAPERWORK WILL BE SENT BACK TO [employer]. HE HAS UNTIL THE 28th. PLEASE INCLUDE CLAIM #[id number].

## 2024-04-24 ENCOUNTER — OFFICE VISIT (OUTPATIENT)
Dept: SURGERY | Facility: CLINIC | Age: 49
End: 2024-04-24
Payer: COMMERCIAL

## 2024-04-24 VITALS
DIASTOLIC BLOOD PRESSURE: 90 MMHG | BODY MASS INDEX: 39.6 KG/M2 | HEIGHT: 66 IN | WEIGHT: 246.4 LBS | SYSTOLIC BLOOD PRESSURE: 142 MMHG

## 2024-04-24 DIAGNOSIS — K42.9 UMBILICAL HERNIA WITHOUT OBSTRUCTION AND WITHOUT GANGRENE: Primary | ICD-10-CM

## 2024-04-24 PROCEDURE — 99024 POSTOP FOLLOW-UP VISIT: CPT | Performed by: SURGERY

## 2024-04-24 RX ORDER — ALBUTEROL SULFATE 90 UG/1
2 AEROSOL, METERED RESPIRATORY (INHALATION) EVERY 4 HOURS PRN
COMMUNITY
Start: 2024-04-16

## 2024-04-24 NOTE — PROGRESS NOTES
Postop open umbilical hernia repair, seroma    Subjective:  Continues to improve.  Still some mild soreness and pulling at the site but overall is getting better.    Objective:  BMI 39.8  General: Awake and alert without distress  Abdomen: Soft and benign.  Infraumbilical scabbing is improving, there is no evidence for any residual seroma and no sign of infection.  Appropriate tenderness.    Assessment and plan:  -This nice gentleman is now approximately 8 weeks out from an open umbilical hernia repair with a postoperative complication of seroma formation, drainage which has now resolved  -I believe that as of next week he will be ready to return to work without restrictions  -He may follow-up here as needed    Aaron Correa MD  General and Endoscopic Surgery  [facility]    [address], [address]  [place], [place], [address]  P: [phone]  F: [phone]

## 2024-05-05 RX ORDER — LOSARTAN POTASSIUM 50 MG/1
TABLET ORAL
Qty: 90 TABLET | OUTPATIENT
Start: 2024-05-05

## 2024-05-07 DIAGNOSIS — E11.65 TYPE 2 DIABETES MELLITUS WITH HYPERGLYCEMIA, WITHOUT LONG-TERM CURRENT USE OF INSULIN: Primary | ICD-10-CM

## 2024-05-08 RX ORDER — TIRZEPATIDE 10 MG/.5ML
10 INJECTION, SOLUTION SUBCUTANEOUS WEEKLY
Qty: 6 ML | Refills: 1 | Status: SHIPPED | OUTPATIENT
Start: 2024-05-08

## 2024-05-20 DIAGNOSIS — J45.20 MILD INTERMITTENT ASTHMA, UNCOMPLICATED: ICD-10-CM

## 2024-05-20 DIAGNOSIS — J30.2 SEASONAL ALLERGIES: ICD-10-CM

## 2024-05-21 RX ORDER — ALBUTEROL SULFATE 90 UG/1
2 AEROSOL, METERED RESPIRATORY (INHALATION) EVERY 4 HOURS PRN
Qty: 8 G | Refills: 2 | Status: SHIPPED | OUTPATIENT
Start: 2024-05-21

## 2024-05-21 RX ORDER — CETIRIZINE HYDROCHLORIDE 10 MG/1
10 TABLET ORAL DAILY
Qty: 90 TABLET | Refills: 1 | Status: SHIPPED | OUTPATIENT
Start: 2024-05-21

## 2024-05-22 RX ORDER — LOSARTAN POTASSIUM 50 MG/1
50 TABLET ORAL DAILY
OUTPATIENT
Start: 2024-05-22

## 2024-05-22 NOTE — TELEPHONE ENCOUNTER
Caller: Jamie Suárez    Relationship: Self    Best call back number: [phone]    Requested Prescriptions:   Requested Prescriptions     Pending Prescriptions Disp Refills    losartan (COZAAR) 50 MG tablet       Sig: Take 1 tablet by mouth Daily.        Pharmacy where request should be sent: [facility]/PHARMACY #[id number] - [place], [place] - [address] - [phone] PH - [phone] FX     Last office visit with prescribing clinician: 3/11/2024   Last telemedicine visit with prescribing clinician: Visit date not found   Next office visit with prescribing clinician: Visit date not found     Additional details provided by patient: COMPLETELY OUT     Does the patient have less than a 3 day supply:  [x] Yes  [] No    Would you like a call back once the refill request has been completed: [x] Yes [] No    If the office needs to give you a call back, can they leave a voicemail: [x] Yes [] No    Litzy Singh, PCT   05/22/24 11:16 EDT

## 2024-05-23 ENCOUNTER — PATIENT ROUNDING (BHMG ONLY) (OUTPATIENT)
Dept: URGENT CARE | Facility: CLINIC | Age: 49
End: 2024-05-23
Payer: COMMERCIAL

## 2024-05-23 NOTE — ED NOTES
Thank you for letting us care for you during your recent visit to [facility]. We would love to hear about your experience with us. Was this the first time you have visited our location?    We’re always looking for ways to make our patients’ experiences even better. Do you have any recommendations on ways we may improve?    I appreciate you taking the time to respond. Please be on the lookout for a survey about your recent visit from [employer] via text or email. We would greatly appreciate if you could fill that out and turn it back in. We want your voice to be heard and we value your feedback.     Thank you for choosing [facility] for your healthcare needs.

## 2024-05-30 ENCOUNTER — PATIENT ROUNDING (BHMG ONLY) (OUTPATIENT)
Dept: URGENT CARE | Facility: CLINIC | Age: 49
End: 2024-05-30
Payer: COMMERCIAL

## 2024-05-30 NOTE — ED NOTES
Thank you for letting us care for you during your recent visit to [facility]. We would love to hear about your experience with us. Was this the first time you have visited our location?    We’re always looking for ways to make our patients’ experiences even better. Do you have any recommendations on ways we may improve?    I appreciate you taking the time to respond. Please be on the lookout for a survey about your recent visit from [employer] via text or email. We would greatly appreciate if you could fill that out and turn it back in. We want your voice to be heard and we value your feedback.     Thank you for choosing [facility] for your healthcare needs.

## 2024-06-16 NOTE — PROGRESS NOTES
"Maria Del Rosario Suárez is a 48 y.o. male.     CC: Hernia Incision Evaluation    History of Present Illness     Pt comes in today to have his previously repaired umbilical hernia site further evaluated.  Patient had this repaired in February of this year, and had follow-up with general surgery most recently in April.  At that time he did have a seroma formation and drainage which had been documented as resolved at that 4/24/2024 appointment.  Patient reports some hypersthenia at the site, specifically when working where his shirt will rub on the area.    Pt also needs a refill on his Losartan.    The following portions of the patient's history were reviewed and updated as appropriate: allergies, current medications, past family history, past medical history, past social history, past surgical history, and problem list.    Review of Systems   Constitutional:  Negative for activity change, chills and fever.   Respiratory:  Negative for cough.    Cardiovascular:  Negative for chest pain.   Psychiatric/Behavioral:  Negative for dysphoric mood.        /80   Pulse 92   Temp 97.5 °F (36.4 °C) (Oral)   Ht 167.6 cm (66\")   Wt 111 kg (245 lb)   SpO2 95%   BMI 39.54 kg/m²     Objective   Physical Exam  Vitals and nursing note reviewed.   Constitutional:       General: He is not in acute distress.     Appearance: He is well-developed.   Pulmonary:      Effort: Pulmonary effort is normal.   Neurological:      Mental Status: He is alert and oriented to person, place, and time.   Psychiatric:         Behavior: Behavior normal.         Thought Content: Thought content normal.         Assessment & Plan   Diagnoses and all orders for this visit:    1. Primary hypertension (Primary)  -     losartan (Cozaar) 50 MG tablet; Take 1 tablet by mouth Daily.  Dispense: 90 tablet; Refill: 1    2. Hyperesthesia    Patient experiencing normal healing type sensations at the site, and do recommend for his workday where he is on his " feet at [employer] working and the shirt is rubbing on the area, to simply cover that area with a Band-Aid, removing and cleaning when he gets home.

## 2024-06-17 ENCOUNTER — OFFICE VISIT (OUTPATIENT)
Dept: FAMILY MEDICINE CLINIC | Facility: CLINIC | Age: 49
End: 2024-06-17
Payer: COMMERCIAL

## 2024-06-17 VITALS
OXYGEN SATURATION: 95 % | HEIGHT: 66 IN | SYSTOLIC BLOOD PRESSURE: 120 MMHG | BODY MASS INDEX: 39.37 KG/M2 | WEIGHT: 245 LBS | HEART RATE: 92 BPM | DIASTOLIC BLOOD PRESSURE: 80 MMHG | TEMPERATURE: 97.5 F

## 2024-06-17 DIAGNOSIS — I10 PRIMARY HYPERTENSION: Primary | ICD-10-CM

## 2024-06-17 DIAGNOSIS — R20.3 HYPERESTHESIA: ICD-10-CM

## 2024-06-17 PROCEDURE — 99213 OFFICE O/P EST LOW 20 MIN: CPT | Performed by: FAMILY MEDICINE

## 2024-06-17 RX ORDER — LOSARTAN POTASSIUM 50 MG/1
50 TABLET ORAL DAILY
Qty: 90 TABLET | Refills: 1 | Status: SHIPPED | OUTPATIENT
Start: 2024-06-17

## 2024-06-25 DIAGNOSIS — I10 PRIMARY HYPERTENSION: ICD-10-CM

## 2024-06-25 RX ORDER — LOSARTAN POTASSIUM 50 MG/1
50 TABLET ORAL DAILY
Qty: 90 TABLET | Refills: 1 | OUTPATIENT
Start: 2024-06-25

## 2024-06-25 NOTE — TELEPHONE ENCOUNTER
Caller: Jamie Suárez    Relationship: Self    Best call back number: [phone]     Requested Prescriptions:   Requested Prescriptions     Pending Prescriptions Disp Refills    losartan (Cozaar) 50 MG tablet 90 tablet 1     Sig: Take 1 tablet by mouth Daily.        Pharmacy where request should be sent: [facility]/PHARMACY #[id number] - [facility], [place] - [address] AT [facility] [phone]  - [phone] FX     Last office visit with prescribing clinician: 6/17/2024   Last telemedicine visit with prescribing clinician: Visit date not found   Next office visit with prescribing clinician: Visit date not found     Additional details provided by patient: PHARMACY IS SAYING THEY HAVE NOT RECEIVED THIS PRESCRIPTION. PLEASE ADVISE.    Does the patient have less than a 3 day supply:  [x] Yes  [] No    Would you like a call back once the refill request has been completed: [] Yes [x] No    If the office needs to give you a call back, can they leave a voicemail: [] Yes [x] No    Evaristo Banks Rep   06/25/24 12:08 EDT

## 2024-06-26 DIAGNOSIS — I10 PRIMARY HYPERTENSION: ICD-10-CM

## 2024-06-26 RX ORDER — LOSARTAN POTASSIUM 50 MG/1
TABLET ORAL
Qty: 90 TABLET | Refills: 1 | OUTPATIENT
Start: 2024-06-26

## 2024-06-26 NOTE — TELEPHONE ENCOUNTER
Caller: Jamie Suárez    Relationship to patient: Self    Best call back number: [phone]    Patient is needing: HE IS OUT OF THIS MEDICATION. WOULD LIKE IT TO GO TO [facility] AS [facility] WOULD[facility]T FILL IT.

## 2024-06-28 DIAGNOSIS — I10 PRIMARY HYPERTENSION: ICD-10-CM

## 2024-06-28 RX ORDER — LOSARTAN POTASSIUM 50 MG/1
50 TABLET ORAL DAILY
Qty: 90 TABLET | Refills: 1 | Status: SHIPPED | OUTPATIENT
Start: 2024-06-28

## 2024-06-28 NOTE — TELEPHONE ENCOUNTER
Caller: Jamie Suárez    Relationship: Self    Best call back number: [phone]     Requested Prescriptions:   Requested Prescriptions     Pending Prescriptions Disp Refills    losartan (Cozaar) 50 MG tablet 90 tablet 1     Sig: Take 1 tablet by mouth Daily.        Pharmacy where request should be sent: [facility] #[id number] - [place], [place] - [address]  AT [facility] [address] - [phone] [facility] [phone]      Last office visit with prescribing clinician: 6/17/2024   Last telemedicine visit with prescribing clinician: Visit date not found   Next office visit with prescribing clinician: Visit date not found     Additional details provided by patient: PATIENT STATED THAT [facility] IS UNABLE TO FILL THIS MEDICATION AND THEY HAVE ADVISED HIM THAT HE NEEDS TO GET THIS FILLED AT [facility]. PLEASE ADVISE.    Does the patient have less than a 3 day supply:  [x] Yes  [] No    Would you like a call back once the refill request has been completed: [] Yes [x] No    If the office needs to give you a call back, can they leave a voicemail: [] Yes [x] No    Evaristo Banks Rep   06/28/24 13:21 EDT

## 2024-07-01 DIAGNOSIS — I10 PRIMARY HYPERTENSION: ICD-10-CM

## 2024-07-01 RX ORDER — LOSARTAN POTASSIUM 50 MG/1
50 TABLET ORAL DAILY
Qty: 90 TABLET | Refills: 1 | OUTPATIENT
Start: 2024-07-01

## 2024-07-01 NOTE — TELEPHONE ENCOUNTER
Caller: Jamie Suárez    Relationship: Self    Best call back number: [phone]    Requested Prescriptions:   Requested Prescriptions     Pending Prescriptions Disp Refills    losartan (Cozaar) 50 MG tablet 90 tablet 1     Sig: Take 1 tablet by mouth Daily.        Pharmacy where request should be sent: [facility] #[id number] - [place], [place] - [address]  AT [facility] [address] - [phone] [facility] [phone]      Last office visit with prescribing clinician: 6/17/2024   Last telemedicine visit with prescribing clinician: Visit date not found   Next office visit with prescribing clinician: Visit date not found     Additional details provided by patient: OUT OF MEDICATION.  WAS SENT TO [place] BY MISTAKE.  SHOULD BE SENT HERE AS HE IS OUT OF TOWN    Does the patient have less than a 3 day supply:  [x] Yes  [] No    Would you like a call back once the refill request has been completed: [] Yes [x] No    If the office needs to give you a call back, can they leave a voicemail: [] Yes [x] No    Evaristo Krishnamurthy Rep   07/01/24 10:52 EDT

## 2024-07-10 DIAGNOSIS — E78.2 MIXED HYPERLIPIDEMIA: ICD-10-CM

## 2024-07-10 DIAGNOSIS — E11.65 TYPE 2 DIABETES MELLITUS WITH HYPERGLYCEMIA, WITHOUT LONG-TERM CURRENT USE OF INSULIN: ICD-10-CM

## 2024-07-10 RX ORDER — DAPAGLIFLOZIN 10 MG/1
1 TABLET, FILM COATED ORAL DAILY
Qty: 90 TABLET | Refills: 0 | Status: SHIPPED | OUTPATIENT
Start: 2024-07-10

## 2024-07-10 RX ORDER — ROSUVASTATIN CALCIUM 10 MG/1
10 TABLET, COATED ORAL DAILY
Qty: 90 TABLET | Refills: 3 | Status: SHIPPED | OUTPATIENT
Start: 2024-07-10

## 2024-07-10 RX ORDER — OMEPRAZOLE 40 MG/1
40 CAPSULE, DELAYED RELEASE ORAL DAILY
Qty: 90 CAPSULE | Refills: 3 | Status: SHIPPED | OUTPATIENT
Start: 2024-07-10

## 2024-07-10 NOTE — TELEPHONE ENCOUNTER
Rx Refill Note  Requested Prescriptions     Pending Prescriptions Disp Refills    dapagliflozin Propanediol 10 MG tablet [Pharmacy Med Name: DAPAGLIFLOZIN 10MG TABLETS] 90 tablet 1     Sig: TAKE 1 TABLET BY MOUTH DAILY      Last office visit with prescribing clinician: 3/13/2024   Last telemedicine visit with prescribing clinician: Visit date not found   Next office visit with prescribing clinician: 9/13/2024                         Would you like a call back once the refill request has been completed: [] Yes [] No    If the office needs to give you a call back, can they leave a voicemail: [] Yes [] No    Shyla uGtierrez MA  07/10/24, 10:46 EDT

## 2024-07-28 ENCOUNTER — HOSPITAL ENCOUNTER (EMERGENCY)
Facility: HOSPITAL | Age: 49
Discharge: HOME | End: 2024-07-28
Attending: EMERGENCY MEDICINE | Admitting: EMERGENCY MEDICINE
Payer: COMMERCIAL

## 2024-07-28 ENCOUNTER — APPOINTMENT (OUTPATIENT)
Dept: GENERAL RADIOLOGY | Facility: HOSPITAL | Age: 49
End: 2024-07-28
Payer: COMMERCIAL

## 2024-07-28 VITALS
RESPIRATION RATE: 16 BRPM | TEMPERATURE: 98.1 F | HEIGHT: 65 IN | OXYGEN SATURATION: 94 % | DIASTOLIC BLOOD PRESSURE: 96 MMHG | SYSTOLIC BLOOD PRESSURE: 121 MMHG | HEART RATE: 101 BPM | WEIGHT: 230 LBS | BODY MASS INDEX: 38.32 KG/M2

## 2024-07-28 DIAGNOSIS — K21.00 GASTROESOPHAGEAL REFLUX DISEASE WITH ESOPHAGITIS WITHOUT HEMORRHAGE: Primary | ICD-10-CM

## 2024-07-28 LAB
ALBUMIN SERPL-MCNC: 4.1 G/DL (ref 3.5–5.2)
ALBUMIN/GLOB SERPL: 1.2 G/DL
ALP SERPL-CCNC: 65 U/L (ref 39–117)
ALT SERPL W P-5'-P-CCNC: 40 U/L (ref 1–41)
ANION GAP SERPL CALCULATED.3IONS-SCNC: 10.1 MMOL/L (ref 5–15)
AST SERPL-CCNC: 29 U/L (ref 1–40)
BASOPHILS # BLD AUTO: 0.06 10*3/MM3 (ref 0–0.2)
BASOPHILS NFR BLD AUTO: 0.5 % (ref 0–1.5)
BILIRUB SERPL-MCNC: 0.2 MG/DL (ref 0–1.2)
BUN SERPL-MCNC: 17 MG/DL (ref 6–20)
BUN/CREAT SERPL: 14.5 (ref 7–25)
CALCIUM SPEC-SCNC: 9.1 MG/DL (ref 8.6–10.5)
CHLORIDE SERPL-SCNC: 107 MMOL/L (ref 98–107)
CO2 SERPL-SCNC: 20.9 MMOL/L (ref 22–29)
CREAT SERPL-MCNC: 1.17 MG/DL (ref 0.76–1.27)
DEPRECATED RDW RBC AUTO: 45.2 FL (ref 37–54)
EGFRCR SERPLBLD CKD-EPI 2021: 76.4 ML/MIN/1.73
EOSINOPHIL # BLD AUTO: 0.31 10*3/MM3 (ref 0–0.4)
EOSINOPHIL NFR BLD AUTO: 2.6 % (ref 0.3–6.2)
ERYTHROCYTE [DISTWIDTH] IN BLOOD BY AUTOMATED COUNT: 13.3 % (ref 12.3–15.4)
GLOBULIN UR ELPH-MCNC: 3.5 GM/DL
GLUCOSE SERPL-MCNC: 159 MG/DL (ref 65–99)
HCT VFR BLD AUTO: 57.3 % (ref 37.5–51)
HGB BLD-MCNC: 19 G/DL (ref 13–17.7)
IMM GRANULOCYTES # BLD AUTO: 0.05 10*3/MM3 (ref 0–0.05)
IMM GRANULOCYTES NFR BLD AUTO: 0.4 % (ref 0–0.5)
LYMPHOCYTES # BLD AUTO: 4.45 10*3/MM3 (ref 0.7–3.1)
LYMPHOCYTES NFR BLD AUTO: 38 % (ref 19.6–45.3)
MCH RBC QN AUTO: 30.2 PG (ref 26.6–33)
MCHC RBC AUTO-ENTMCNC: 33.2 G/DL (ref 31.5–35.7)
MCV RBC AUTO: 91.1 FL (ref 79–97)
MONOCYTES # BLD AUTO: 0.94 10*3/MM3 (ref 0.1–0.9)
MONOCYTES NFR BLD AUTO: 8 % (ref 5–12)
NEUTROPHILS NFR BLD AUTO: 5.91 10*3/MM3 (ref 1.7–7)
NEUTROPHILS NFR BLD AUTO: 50.5 % (ref 42.7–76)
PLATELET # BLD AUTO: 157 10*3/MM3 (ref 140–450)
PMV BLD AUTO: 11.7 FL (ref 6–12)
POTASSIUM SERPL-SCNC: 4.2 MMOL/L (ref 3.5–5.2)
PROT SERPL-MCNC: 7.6 G/DL (ref 6–8.5)
RBC # BLD AUTO: 6.29 10*6/MM3 (ref 4.14–5.8)
SODIUM SERPL-SCNC: 138 MMOL/L (ref 136–145)
TROPONIN T SERPL HS-MCNC: 9 NG/L
WBC NRBC COR # BLD AUTO: 11.72 10*3/MM3 (ref 3.4–10.8)

## 2024-07-28 PROCEDURE — 71046 X-RAY EXAM CHEST 2 VIEWS: CPT

## 2024-07-28 PROCEDURE — 99284 EMERGENCY DEPT VISIT MOD MDM: CPT

## 2024-07-28 PROCEDURE — 84484 ASSAY OF TROPONIN QUANT: CPT | Performed by: EMERGENCY MEDICINE

## 2024-07-28 PROCEDURE — 93005 ELECTROCARDIOGRAM TRACING: CPT | Performed by: EMERGENCY MEDICINE

## 2024-07-28 PROCEDURE — 80053 COMPREHEN METABOLIC PANEL: CPT | Performed by: EMERGENCY MEDICINE

## 2024-07-28 PROCEDURE — 36415 COLL VENOUS BLD VENIPUNCTURE: CPT

## 2024-07-28 PROCEDURE — 85025 COMPLETE CBC W/AUTO DIFF WBC: CPT | Performed by: EMERGENCY MEDICINE

## 2024-07-28 RX ORDER — ASPIRIN 81 MG/1
81 TABLET, CHEWABLE ORAL ONCE
Status: COMPLETED | OUTPATIENT
Start: 2024-07-28 | End: 2024-07-28

## 2024-07-28 RX ORDER — SUCRALFATE ORAL 1 G/10ML
1 SUSPENSION ORAL 4 TIMES DAILY
Qty: 420 ML | Refills: 0 | Status: SHIPPED | OUTPATIENT
Start: 2024-07-28

## 2024-07-28 RX ORDER — LIDOCAINE HYDROCHLORIDE 20 MG/ML
5 SOLUTION OROPHARYNGEAL ONCE
Status: COMPLETED | OUTPATIENT
Start: 2024-07-28 | End: 2024-07-28

## 2024-07-28 RX ORDER — SODIUM CHLORIDE 0.9 % (FLUSH) 0.9 %
10 SYRINGE (ML) INJECTION AS NEEDED
Status: DISCONTINUED | OUTPATIENT
Start: 2024-07-28 | End: 2024-07-29 | Stop reason: HOSPADM

## 2024-07-28 RX ADMIN — LIDOCAINE HYDROCHLORIDE 5 ML: 20 SOLUTION ORAL at 23:16

## 2024-07-28 RX ADMIN — ASPIRIN 81 MG: 81 TABLET, CHEWABLE ORAL at 23:16

## 2024-07-28 RX ADMIN — ALUMINUM HYDROXIDE, MAGNESIUM HYDROXIDE, DIMETHICONE: 400; 400; 40 SUSPENSION ORAL at 23:16

## 2024-07-28 NOTE — Clinical Note
[facility]  [address]  [facility] [address]    Jamie Suárez was seen and treated in our emergency department on 7/28/2024.  He may return to work on 07/30/2024.         Thank you for choosing [facility].    Kai Matson MD

## 2024-07-29 LAB
QT INTERVAL: 323 MS
QTC INTERVAL: 439 MS

## 2024-07-29 NOTE — FSED PROVIDER NOTE
Subjective   History of Present Illness  Patient is complaining of epigastric pain goes straight up into his throat he actually has symptoms in his throat especially when he is laying down.  This just started today he did have pizza he only eats 1 meal a day he does not drink caffeine.  The patient is on Prilosec he does have a history of a hiatal hernia and GERD.  He does have morbid obesity.      Review of Systems   Gastrointestinal:  Positive for abdominal pain.   All other systems reviewed and are negative.      Past Medical History:   Diagnosis Date    Bronchitis     COVID-19 03/2021    Diabetes mellitus     GERD (gastroesophageal reflux disease)     Reflux    Hiatal hernia     Nausea and vomiting 01/16/2019    Added automatically from request for surgery [id number]    Obesity     Sleep apnea     DOES NOT USE CPAP AT THIS TIME    Umbilical hernia        Allergies   Allergen Reactions    Morphine And Codeine Rash     He had a rash with morphine but no  Problem  with codiene       Past Surgical History:   Procedure Laterality Date    APPENDECTOMY      COLONOSCOPY W/ POLYPECTOMY N/A 04/20/2023    Procedure: COLONOSCOPY TO CECUM WITH COLD SNARE POLYPECTOMIES;  Surgeon: Lew Riddle MD;  Location: [facility] ENDOSCOPY;  Service: Gastroenterology;  Laterality: N/A;  pre: SCREENING  post: POLYPS    ENDOSCOPY N/A 01/21/2019    Procedure: ESOPHAGOGASTRODUODENOSCOPY with biopsy;  Surgeon: Lew Riddle MD;  Location: [facility] ENDOSCOPY;  Service: Gastroenterology    ENDOSCOPY N/A 04/20/2023    Procedure: ESOPHAGOGASTRODUODENOSCOPY WITH BIOPSIES;  Surgeon: Lew Riddle MD;  Location: [facility] ENDOSCOPY;  Service: Gastroenterology;  Laterality: N/A;  pre: HEARTBURN  post: HIATAL HERNIA    HERNIA REPAIR Bilateral     INGUINAL    TONSILLECTOMY      UMBILICAL HERNIA REPAIR N/A 2/27/2024    Procedure: UMBILICAL HERNIA REPAIR with mesh;  Surgeon: Aaron Correa MD;  Location: [facility] OR;  Service:  General;  Laterality: N/A;       Family History   Problem Relation Age of Onset    Heart disease Mother     Hypertension Mother     Diabetes Father     Malig Hyperthermia Neg Hx        Social History     Socioeconomic History    Marital status: Single    Number of children: 5   Tobacco Use    Smoking status: Never    Smokeless tobacco: Never   Vaping Use    Vaping status: Never Used   Substance and Sexual Activity    Alcohol use: No    Drug use: No    Sexual activity: Defer           Objective   Physical Exam  Vitals and nursing note reviewed.   Constitutional:       General: He is not in acute distress.     Appearance: He is well-developed. He is not ill-appearing, toxic-appearing or diaphoretic.   HENT:      Head: Normocephalic and atraumatic.   Eyes:      Extraocular Movements: Extraocular movements intact.      Pupils: Pupils are equal, round, and reactive to light.   Cardiovascular:      Rate and Rhythm: Normal rate and regular rhythm.      Heart sounds: Normal heart sounds.   Pulmonary:      Effort: Pulmonary effort is normal. No respiratory distress.      Breath sounds: Normal breath sounds. No stridor. No wheezing or rhonchi.   Abdominal:      General: Abdomen is protuberant. Bowel sounds are normal.      Palpations: Abdomen is soft.      Tenderness:  in the epigastric area   Skin:     General: Skin is warm and dry.      Capillary Refill: Capillary refill takes less than 2 seconds.   Neurological:      Mental Status: He is alert and oriented to person, place, and time.   Psychiatric:         Mood and Affect: Mood normal.         Procedures           ED Course                                           Medical Decision Making  Patient's labs are essentially normal his troponin is negative he has had the pain over 6 hours.  It is epigastric pain he has a history of reflux and he has waterbrash description to me of the acid up into his back of his throat.  He has a hiatal hernia and he feels like it just gets  stuck in there.  Chest x-ray is nonacute.  The patient is feeling better he will be discharged continue the [facility] place him on Carafate and he is to spread out 4 meals a day not more calories.    Amount and/or Complexity of Data Reviewed  Labs: ordered.     Details: Patient's white count is 11.7 his hemoglobin is 19 polycythemic his glucose 159 hyperglycemic CO2 is 20.9.  Radiology: ordered.     Details: Chest x-ray is nonacute  ECG/medicine tests: ordered.     Details: EKG interpreted by myself shows a sinus tachycardia with a ventricular rate of 111 MI interval 150 ms and QT corrected 439 ms there is no specific ST-T wave change there is an ventricular bigeminy patient has left atrial enlargement and the patient's old EKG dated 2/21/2024 rate is the only thing different otherwise it is a similar EKG no bigeminy and that 1.    Risk  OTC drugs.  Prescription drug management.        Final diagnoses:   Gastroesophageal reflux disease with esophagitis without hemorrhage       ED Disposition  ED Disposition       ED Disposition   Discharge    Condition   Stable    Comment   --               Harrison Broderick MD  [id number]  [place] [id number] [phone]    In 1 week           Medication List        New Prescriptions      sucralfate 1 GM/10ML suspension  Commonly known as: CARAFATE  Take 10 mL by mouth 4 (Four) Times a Day.               Where to Get Your Medications        These medications were sent to [facility]/pharmacy #[id number] - [facility] [place] - [address] AT [facility] - [phone] [facility] [phone]   [address], [facility] [address]      Phone: [phone]   sucralfate 1 GM/10ML suspension

## 2024-07-29 NOTE — DISCHARGE INSTRUCTIONS
Eat 4 small meals a day to prevent the acid from working on your stomach and out of digesting it.  Avoid red sauce red wine least for the next 10 days.  When you do start back just once every couple of days otherwise she will reflux will get worse.  Continue the Prilosec.  I have written you for Carafate start that will help coat your stomach.

## 2024-08-05 DIAGNOSIS — E11.65 TYPE 2 DIABETES MELLITUS WITH HYPERGLYCEMIA, WITHOUT LONG-TERM CURRENT USE OF INSULIN: ICD-10-CM

## 2024-08-05 RX ORDER — TIRZEPATIDE 10 MG/.5ML
10 INJECTION, SOLUTION SUBCUTANEOUS WEEKLY
Qty: 6 ML | Refills: 0 | Status: SHIPPED | OUTPATIENT
Start: 2024-08-05 | End: 2024-08-08

## 2024-08-05 NOTE — TELEPHONE ENCOUNTER
Rx Refill Note  Requested Prescriptions     Pending Prescriptions Disp Refills    Tirzepatide (Mounjaro) 10 MG/0.5ML solution pen-injector pen 6 mL 1     Sig: Inject 0.5 mL under the skin into the appropriate area as directed 1 (One) Time Per Week.      Last office visit with prescribing clinician: 3/13/2024   Last telemedicine visit with prescribing clinician: Visit date not found   Next office visit with prescribing clinician: 9/13/2024                         Would you like a call back once the refill request has been completed: [] Yes [] No    If the office needs to give you a call back, can they leave a voicemail: [] Yes [] No    Shyla Gutierrez MA  08/05/24, 12:15 EDT

## 2024-08-08 DIAGNOSIS — E11.65 TYPE 2 DIABETES MELLITUS WITH HYPERGLYCEMIA, WITHOUT LONG-TERM CURRENT USE OF INSULIN: ICD-10-CM

## 2024-08-08 RX ORDER — TIRZEPATIDE 10 MG/.5ML
INJECTION, SOLUTION SUBCUTANEOUS
Qty: 6 ML | Refills: 0 | Status: SHIPPED | OUTPATIENT
Start: 2024-08-08

## 2024-08-08 NOTE — TELEPHONE ENCOUNTER
Rx Refill Note  Requested Prescriptions     Pending Prescriptions Disp Refills    Mounjaro 10 MG/0.5ML solution pen-injector pen [Pharmacy Med Name: MOUNJARO 10MG/0.5ML INJ (4 PENS)] 6 mL 0     Sig: ADMINISTER 10 MG UNDER THE SKIN 1 TIME EVERY WEEK AS DIRECTED      Last office visit with prescribing clinician: 3/13/2024   Last telemedicine visit with prescribing clinician: Visit date not found   Next office visit with prescribing clinician: 9/13/2024                         Would you like a call back once the refill request has been completed: [] Yes [] No    If the office needs to give you a call back, can they leave a voicemail: [] Yes [] No    Elsi Aguilar MA  08/08/24, 09:51 EDT

## 2024-08-16 ENCOUNTER — TELEPHONE (OUTPATIENT)
Dept: FAMILY MEDICINE CLINIC | Facility: CLINIC | Age: 49
End: 2024-08-16
Payer: COMMERCIAL

## 2024-08-16 NOTE — TELEPHONE ENCOUNTER
Left patient a voicemail to inform him per Dr. Broderick I have not seen him since his trip to the emergency room, thus patient will need an appointment for further evaluation. Instructed patient to contact our office for further questions and scheduling.      HUB TO RELAY

## 2024-08-16 NOTE — TELEPHONE ENCOUNTER
Caller: Jamie Suárez    Relationship to patient: Self    Best call back number:[phone]      Patient is needing: HE WENT TO THE E.R. A MONTH AGO AND THEY PRESCRIBED HIM SOME MEDICATION FOR HIS ULCERS.  INSURANCE STATED THAT IT IS NOT COVERED AND WOULD LIKE AN ALTERNATIVE MEDICATION BE CALLED IN.      [facility] #[id number] [address] AT [facility] [address] - [phone] [facility] [phone] FX

## 2024-08-21 ENCOUNTER — TELEPHONE (OUTPATIENT)
Dept: FAMILY MEDICINE CLINIC | Facility: CLINIC | Age: 49
End: 2024-08-21

## 2024-08-21 ENCOUNTER — NURSE TRIAGE (OUTPATIENT)
Dept: CALL CENTER | Facility: HOSPITAL | Age: 49
End: 2024-08-21
Payer: COMMERCIAL

## 2024-08-22 NOTE — TELEPHONE ENCOUNTER
Reason for Disposition   Pharmacy calling with prescription question and triager answers question    Additional Information   Negative: [1] Intentional drug overdose AND [2] suicidal thoughts or ideas   Negative: Drug overdose and triager unable to answer question   Negative: Caller requesting a renewal or refill of a medicine patient is currently taking   Negative: Caller requesting information unrelated to medicine   Negative: Caller requesting information about COVID-19 Vaccine   Negative: Caller requesting information about Emergency Contraception   Negative: Caller requesting information about Combined Birth Control Pills   Negative: Caller requesting information about Progestin Birth Control Pills   Negative: Caller requesting information about Post-Op pain or medicines   Negative: Caller requesting a prescription antibiotic (such as Penicillin) for Strep throat and has a positive culture result   Negative: Caller requesting a prescription anti-viral med (such as Tamiflu) and has influenza (flu) symptoms   Negative: Immunization reaction suspected   Negative: Rash while taking a medicine or within 3 days of stopping it   Negative: [1] Asthma and [2] having symptoms of asthma (cough, wheezing, etc.)   Negative: [1] Symptom of illness (e.g., headache, abdominal pain, earache, vomiting) AND [2] more than mild   Negative: Breastfeeding questions about mother's medicines and diet   Negative: MORE THAN A DOUBLE DOSE of a prescription or over-the-counter (OTC) drug   Negative: [1] DOUBLE DOSE (an extra dose or lesser amount) of prescription drug AND [2] any symptoms (e.g., dizziness, nausea, pain, sleepiness)   Negative: [1] DOUBLE DOSE (an extra dose or lesser amount) of over-the-counter (OTC) drug AND [2] any symptoms (e.g., dizziness, nausea, pain, sleepiness)   Negative: Took another person's prescription drug   Negative: [1] DOUBLE DOSE (an extra dose or lesser amount) of prescription drug AND [2] NO symptoms   (Exception: A double dose of antibiotics.)   Negative: Diabetes drug error or overdose (e.g., took wrong type of insulin or took extra dose)   Negative: [1] Prescription not at pharmacy AND [2] was prescribed by PCP recently (Exception: Triager has access to EMR and prescription is recorded there. Go to Home Care and confirm for pharmacy.)   Negative: [1] Pharmacy calling with prescription question AND [2] triager unable to answer question   Negative: [1] Caller has URGENT medicine question about med that PCP or specialist prescribed AND [2] triager unable to answer question   Negative: Medicine patch causing local rash or itching   Negative: [1] Caller has medicine question about med NOT prescribed by PCP AND [2] triager unable to answer question (e.g., compatibility with other med, storage)   Negative: Prescription request for new medicine (not a refill)   Negative: [1] Caller has NON-URGENT medicine question about med that PCP prescribed AND [2] triager unable to answer question   Negative: Caller wants to use a complementary or alternative medicine   Negative: [1] Prescription prescribed recently is not at pharmacy AND [2] triager has access to patient's EMR AND [3] prescription is recorded in the EMR   Negative: [1] DOUBLE DOSE (an extra dose or lesser amount) of over-the-counter (OTC) drug AND [2] NO symptoms   Negative: [1] DOUBLE DOSE (an extra dose or lesser amount) of antibiotic drug AND [2] NO symptoms   Negative: Caller has medicine question only, adult not sick, AND triager answers question   Negative: Caller has medicine question, adult has minor symptoms, caller declines triage, AND triager answers question   Negative: Caller requesting information about medicine during pregnancy; adult is not ill AND triager answers question   Negative: Caller requesting information about medicine use with breastfeeding; neither adult nor infant is ill, triager answers question    Answer Assessment - Initial  "Assessment Questions  1. NAME of MEDICINE: \"What medicine(s) are you calling about?\"      ZPAK  2. QUESTION: \"What is your question?\" (e.g., double dose of medicine, side effect)      Med says can't fill till 8/23  3. PRESCRIBER: \"Who prescribed the medicine?\" Reason: if prescribed by specialist, call should be referred to that group.     ER    4. SYMPTOMS: \"Do you have any symptoms?\" If Yes, ask: \"What symptoms are you having?\"  \"How bad are the symptoms (e.g., mild, moderate, severe)      Went to ER today for congestion cough sickness  5. PREGNANCY:  \"Is there any chance that you are pregnant?\" \"When was your last menstrual period?\"      NA    Protocols used: Medication Question Call-ADULT-    "

## 2024-08-27 ENCOUNTER — OFFICE VISIT (OUTPATIENT)
Dept: FAMILY MEDICINE CLINIC | Facility: CLINIC | Age: 49
End: 2024-08-27
Payer: COMMERCIAL

## 2024-08-27 VITALS
RESPIRATION RATE: 18 BRPM | HEIGHT: 66 IN | BODY MASS INDEX: 36.96 KG/M2 | TEMPERATURE: 98.4 F | HEART RATE: 96 BPM | WEIGHT: 230 LBS | OXYGEN SATURATION: 99 % | SYSTOLIC BLOOD PRESSURE: 128 MMHG | DIASTOLIC BLOOD PRESSURE: 84 MMHG

## 2024-08-27 DIAGNOSIS — J01.00 ACUTE NON-RECURRENT MAXILLARY SINUSITIS: Primary | ICD-10-CM

## 2024-08-27 DIAGNOSIS — J40 BRONCHITIS: ICD-10-CM

## 2024-08-27 PROCEDURE — 99214 OFFICE O/P EST MOD 30 MIN: CPT | Performed by: FAMILY MEDICINE

## 2024-08-27 RX ORDER — METHYLPREDNISOLONE 4 MG
TABLET, DOSE PACK ORAL
Qty: 21 TABLET | Refills: 0 | Status: SHIPPED | OUTPATIENT
Start: 2024-08-27

## 2024-08-27 NOTE — PROGRESS NOTES
Subjective   Jamie Suárez is a 48 y.o. male.     CC: Ed F/U for URI    History of Present Illness     Patient returns today after being seen first at the [facility] urgent care on 8/21/2024, and then the next day at [facility] emergency department with this note:    HPI:  Chief complaint:URI  A complete HPI/ROS/PMH/PSH/SH/FH are unobtainable due to: n/a  Context:Jamie Suárez is a 48 y.o. male who presents to the ED with c/o acute URI symptoms that started 2 days ago.  Had covid testing one day ago that was negative. States he was treated for swimmer's ear with some eardrops.  His symptoms are not made better or worse by anything he has been trying over-the-counter meds.  Denies any dyspnea on exertion or fever    Chest x-ray was negative.    DIAGNOSIS  Final diagnoses:  Viral URI with cough    azithromycin 250 MG tablet  Commonly known as: Zithromax Z-Amadeo  Take 2 tablets the first day, then 1 tablet daily for 4 days.  Start taking on: August 23, 2024    Current outpatient and discharge medications have been reconciled for the patient.  Reviewed by: Harrison Broderick MD    Pt reports continued issues with facial p/p, cough (green sputum), mild dyspnea. No f/c. No n/v/d.   Also reports some audible wheezing.  Did start back using his albuterol today.      The following portions of the patient's history were reviewed and updated as appropriate: allergies, current medications, past family history, past medical history, past social history, past surgical history, and problem list.    Review of Systems   Constitutional:  Negative for activity change, chills and fever.   HENT:  Positive for congestion and sinus pressure.    Respiratory:  Positive for cough, shortness of breath and wheezing.    Cardiovascular:  Negative for chest pain.   Psychiatric/Behavioral:  Negative for dysphoric mood.        Objective   Physical Exam  Vitals and nursing note reviewed.   Constitutional:       General: He is not in acute distress.      Appearance: He is well-developed.   HENT:      Nose:      Right Sinus: Maxillary sinus tenderness present.      Left Sinus: Maxillary sinus tenderness present.   Cardiovascular:      Rate and Rhythm: Normal rate and regular rhythm.   Pulmonary:      Effort: Pulmonary effort is normal.      Breath sounds: Wheezing (faint/diffuse) present.   Neurological:      Mental Status: He is alert and oriented to person, place, and time.   Psychiatric:         Behavior: Behavior normal.         Thought Content: Thought content normal.     UCC/ED notes reviewed with pt at today's visit.    Assessment & Plan     Diagnoses and all orders for this visit:    1. Acute non-recurrent maxillary sinusitis (Primary)  -     amoxicillin-clavulanate (AUGMENTIN) 875-125 MG per tablet; Take 1 tablet by mouth Every 12 (Twelve) Hours.  Dispense: 20 tablet; Refill: 0  -     methylPREDNISolone (Medrol) 4 MG dose pack; follow package directions  Dispense: 21 tablet; Refill: 0    2. Bronchitis  Comments:  With systemic symptoms  Orders:  -     amoxicillin-clavulanate (AUGMENTIN) 875-125 MG per tablet; Take 1 tablet by mouth Every 12 (Twelve) Hours.  Dispense: 20 tablet; Refill: 0  -     methylPREDNISolone (Medrol) 4 MG dose pack; follow package directions  Dispense: 21 tablet; Refill: 0    Patient to add Flonase for the sinuses and to continue using his albuterol every 4-6 hours as needed.  Patient encouraged to eat a very low carb diet while on his steroids.  Patient's last A1c was excellent, allowing us to use the steroids at this time.

## 2024-09-13 ENCOUNTER — OFFICE VISIT (OUTPATIENT)
Dept: ENDOCRINOLOGY | Age: 49
End: 2024-09-13
Payer: COMMERCIAL

## 2024-09-13 ENCOUNTER — SPECIALTY PHARMACY (OUTPATIENT)
Dept: ENDOCRINOLOGY | Age: 49
End: 2024-09-13
Payer: COMMERCIAL

## 2024-09-13 VITALS
SYSTOLIC BLOOD PRESSURE: 132 MMHG | WEIGHT: 245.2 LBS | HEART RATE: 94 BPM | TEMPERATURE: 97.9 F | DIASTOLIC BLOOD PRESSURE: 88 MMHG | HEIGHT: 66 IN | BODY MASS INDEX: 39.41 KG/M2 | OXYGEN SATURATION: 97 %

## 2024-09-13 DIAGNOSIS — E66.01 CLASS 2 SEVERE OBESITY DUE TO EXCESS CALORIES WITH SERIOUS COMORBIDITY AND BODY MASS INDEX (BMI) OF 39.0 TO 39.9 IN ADULT: ICD-10-CM

## 2024-09-13 DIAGNOSIS — I10 PRIMARY HYPERTENSION: ICD-10-CM

## 2024-09-13 DIAGNOSIS — E11.65 TYPE 2 DIABETES MELLITUS WITH HYPERGLYCEMIA, WITHOUT LONG-TERM CURRENT USE OF INSULIN: Primary | ICD-10-CM

## 2024-09-13 DIAGNOSIS — E78.2 MIXED HYPERLIPIDEMIA: ICD-10-CM

## 2024-09-13 PROCEDURE — 99214 OFFICE O/P EST MOD 30 MIN: CPT | Performed by: NURSE PRACTITIONER

## 2024-09-13 RX ORDER — DAPAGLIFLOZIN 10 MG/1
10 TABLET, FILM COATED ORAL DAILY
COMMUNITY

## 2024-09-13 RX ORDER — TIRZEPATIDE 10 MG/.5ML
10 INJECTION, SOLUTION SUBCUTANEOUS WEEKLY
Qty: 6 ML | Refills: 0 | Status: SHIPPED | OUTPATIENT
Start: 2024-09-13

## 2024-09-13 RX ORDER — LOSARTAN POTASSIUM 50 MG/1
50 TABLET ORAL DAILY
Qty: 90 TABLET | Refills: 0 | Status: SHIPPED | OUTPATIENT
Start: 2024-09-13

## 2024-09-13 NOTE — PROGRESS NOTES
"Chief Complaint  Diabetes    Subjective        Jamie Suárez presents to [facility] ENDOCRINOLOGY  History of Present Illness    Type 2 dm, < 5 years    DM regimen: Mounjaro 10 mg weekly- reports availability has been hit or miss, metformin at 1000 mg once daily, farxiga 10mg daily   Overdue for diabetic eye exam   CV: rosuvastatin   Renal: losartan- reports pharmacy did not fill      Objective   Vital Signs:  /88   Pulse 94   Temp 97.9 °F (36.6 °C) (Oral)   Ht 167.6 cm (65.98\")   Wt 111 kg (245 lb 3.2 oz)   SpO2 97%   BMI 39.60 kg/m²   Estimated body mass index is 39.6 kg/m² as calculated from the following:    Height as of this encounter: 167.6 cm (65.98\").    Weight as of this encounter: 111 kg (245 lb 3.2 oz).          Physical Exam  Vitals reviewed.   Constitutional:       General: He is not in acute distress.  HENT:      Head: Normocephalic and atraumatic.   Cardiovascular:      Rate and Rhythm: Normal rate.   Pulmonary:      Effort: Pulmonary effort is normal. No respiratory distress.   Musculoskeletal:         General: No signs of injury. Normal range of motion.      Cervical back: Normal range of motion and neck supple.   Skin:     General: Skin is warm and dry.   Neurological:      Mental Status: He is alert and oriented to person, place, and time. Mental status is at baseline.   Psychiatric:         Mood and Affect: Mood normal.         Behavior: Behavior normal.         Thought Content: Thought content normal.         Judgment: Judgment normal.        Result Review :  The following data was reviewed by: KAYLA Cifuentes on 09/13/2024:  Common labs          2/21/2024    07:14 3/13/2024    11:00 7/28/2024    22:54   Common Labs   Glucose 105  133  159    BUN 17  16  17    Creatinine 1.28  1.12  1.17    Sodium 142  139  138    Potassium 4.0  4.8  4.2    Chloride 107  105  107    Calcium 8.7  9.2  9.1    Albumin   4.1    Total Bilirubin   0.2    Alkaline Phosphatase   65  "   AST (SGOT)   29    ALT (SGPT)   40    WBC 10.02   11.72    Hemoglobin 17.9   19.0    Hematocrit 53.6   57.3    Platelets 252   157    Total Cholesterol  131     Triglycerides  103     HDL Cholesterol  53     LDL Cholesterol   59     Hemoglobin A1C  6.70                 Assessment and Plan   Diagnoses and all orders for this visit:    1. Type 2 diabetes mellitus with hyperglycemia, without long-term current use of insulin (Primary)  -     Tirzepatide (Mounjaro) 10 MG/0.5ML solution pen-injector pen; Inject 0.5 mL under the skin into the appropriate area as directed 1 (One) Time Per Week.  Dispense: 6 mL; Refill: 0  -     Lipid Panel  -     Hemoglobin A1c  -     Comprehensive Metabolic Panel  -     Microalbumin / Creatinine Urine Ratio - Urine, Clean Catch    2. Primary hypertension  -     losartan (Cozaar) 50 MG tablet; Take 1 tablet by mouth Daily.  Dispense: 90 tablet; Refill: 0    3. Mixed hyperlipidemia    4. Class 2 severe obesity due to excess calories with serious comorbidity and body mass index (BMI) of 39.0 to 39.9 in adult             Follow Up   No follow-ups on file.    Arrange diabetic eye exam   Our pharmacy to help with medication fills  Resume lorsartan   Continue statin  Labs today     Patient was given instructions and counseling regarding his condition or for health maintenance advice. Please see specific information pulled into the AVS if appropriate.       KAYLA Cifuentes

## 2024-09-13 NOTE — PROGRESS NOTES
Specialty Pharmacy Patient Management Program  Endocrinology Initial Assessment     Jamie Suárez was referred by an Endocrinology provider to the Endocrinology Patient Management program offered by [facility] for Type 2 Diabetes on 09/13/24.  An initial outreach was conducted, including assessment of therapy appropriateness and specialty medication education for Mounjaro. The patient was introduced to services offered by [facility], including: regular assessments, refill coordination, curbside pick-up or mail order delivery options, prior authorization maintenance, and financial assistance programs as applicable. The patient was also provided with contact information for the pharmacy team.     Insurance Coverage & Financial Support  [employer]    Benefits Investigation Summary    Prescription: Renewal     Dispensing pharmacy: [facility]     Copay amount: Mounjaro $25/28 days    Prior Auth and Med Assistance notes: N/A    BIN: [id number]  PCN: [id number]   RX GROUP: [id number]    Relevant Past Medical History and Comorbidities  Relevant medical history and concomitant health conditions were discussed with the patient. The patient's chart has been reviewed for relevant past medical history and comorbid conditions and updated as necessary.  Past Medical History:   Diagnosis Date    Bronchitis     COVID-19 03/2021    Diabetes mellitus     GERD (gastroesophageal reflux disease)     Reflux    Hiatal hernia     Nausea and vomiting 01/16/2019    Added automatically from request for surgery [id number]    Obesity     Sleep apnea     DOES NOT USE CPAP AT THIS TIME    Umbilical hernia      Social History     Socioeconomic History    Marital status: Single    Number of children: 5   Tobacco Use    Smoking status: Never    Smokeless tobacco: Never   Vaping Use    Vaping status: Never Used   Substance and Sexual Activity    Alcohol use: No    Drug use: No    Sexual activity: Defer       Problem list  reviewed by Yaritza Abarca RPH on 9/13/2024 at 10:40 AM    Allergies  Known allergies and reactions were discussed with the patient. The patient's chart has been reviewed for  allergy information and updated as necessary.   Allergies   Allergen Reactions    Morphine And Codeine Rash     He had a rash with morphine but no  Problem  with codiene       Allergies reviewed by Yaritza Abarca RPH on 9/13/2024 at 10:39 AM    Relevant Laboratory Values  Relevant laboratory values were discussed with the patient. The following specialty medication dose adjustment(s) are recommended: No adjustments needed at this time.   A1C Last 3 Results          3/13/2024    11:00   HGBA1C Last 3 Results   Hemoglobin A1C 6.70      Lab Results   Component Value Date    HGBA1C 6.70 (H) 03/13/2024     Lab Results   Component Value Date    GLUCOSE 159 (H) 07/28/2024    CALCIUM 9.1 07/28/2024     07/28/2024    K 4.2 07/28/2024    CO2 20.9 (L) 07/28/2024     07/28/2024    BUN 17 07/28/2024    CREATININE 1.17 07/28/2024    EGFRIFAFRI >60 02/04/2023    EGFRIFNONA >60 05/15/2022    BCR 14.5 07/28/2024    ANIONGAP 10.1 07/28/2024     Lab Results   Component Value Date    CHLPL 131 03/13/2024    TRIG 103 03/13/2024    HDL 53 03/13/2024    LDL 59 03/13/2024         Current Medication List  This medication list has been reviewed with the patient and evaluated for any interactions or necessary modifications/recommendations, and updated to include all prescription medications, OTC medications, and supplements the patient is currently taking.  This list reflects what is contained in the patient's profile, which has also been marked as reviewed to communicate to other providers it is the most up to date version of the patient's current medication therapy.     Current Outpatient Medications:     albuterol sulfate  (90 Base) MCG/ACT inhaler, TAKE 2 PUFFS BY MOUTH EVERY 4 HOURS AS NEEDED FOR WHEEZE, Disp: 8 g, Rfl: 2    cetirizine (zyrTEC)  10 MG tablet, TAKE 1 TABLET BY MOUTH EVERY DAY, Disp: 90 tablet, Rfl: 1    dapagliflozin Propanediol (Farxiga) 10 MG tablet, Take 10 mg by mouth Daily., Disp: , Rfl:     Insulin Pen Needle 32G X 4 MM misc, Use QD, Disp: 20 each, Rfl: 3    losartan (Cozaar) 50 MG tablet, Take 1 tablet by mouth Daily., Disp: 90 tablet, Rfl: 0    metFORMIN (GLUCOPHAGE) 500 MG tablet, Take 2 tablets by mouth Daily With Breakfast., Disp: 180 tablet, Rfl: 1    omeprazole (priLOSEC) 40 MG capsule, TAKE 1 CAPSULE BY MOUTH DAILY FOR GERD, Disp: 90 capsule, Rfl: 3    rosuvastatin (CRESTOR) 10 MG tablet, TAKE 1 TABLET BY MOUTH DAILY FOR CHOLESTEROL, Disp: 90 tablet, Rfl: 3    Tirzepatide (Mounjaro) 10 MG/0.5ML solution pen-injector pen, Inject 0.5 mL under the skin into the appropriate area as directed 1 (One) Time Per Week., Disp: 6 mL, Rfl: 0    vitamin C (ASCORBIC ACID) 250 MG tablet, Take 1 tablet by mouth Daily., Disp: , Rfl:     Medicines reviewed by Yaritza Abarca RP on 9/13/2024 at 10:40 AM    Drug Interactions  No clinically   Recommended Medications Assessment  Aspirin: Not Taking Currently  Statin: Currently Taking   ACEi/ARB: Currently Taking     Initial Education Provided for Specialty Medication  The patient has been provided with the following education and any applicable administration techniques (i.e. self-injection) have been demonstrated for the therapies indicated. All questions and concerns have been addressed prior to the patient receiving the medication, and the patient has verbalized comprehension of the education and any materials provided. Additional patient education shall be provided and documented upon request by the patient, provider, or payer.    Mounjaro® (tirzepatide)   How long will I be on this medication for?  The amount of time you will be on this medication will be determined by your doctor based on blood sugar and A1c control. You will most likely be on this medication or another diabetes medication  throughout your lifetime. Do not abruptly stop this medication without talking to your doctor first.     How do I take this medication?  Take as directed on your prescription label. Mounjaro is supplied in a single-use pen for each dose and you will use a new pre-filled pen each week.  It is injected under the skin (subcutaneously) of your stomach, thigh or upper arm.  You may inject in the same body area each week, on the same day each week, with or without food.      What are some possible side effects?  In addition to decreased appetite, the most common side effects are nausea and indigestion. Redness, itching, and/or swelling at the injection site may occur. You should also monitor for low blood sugar (hypoglycemia) if you are taking Mounjaro with other medications that cause low blood sugar.     What happens if I miss a dose?  If you miss a dose, take it as soon as you remember as long as there are at least 3 days until the next scheduled dose.  If there are less than 3 days, skip the missed dose and resume  Mounjaro on the regularly scheduled day.  Do not take 2 doses at the same time or extra doses.      Medication Safety    What are things I should warn my doctor immediately about?  Do not use Mounjaro if you or a family member have ever had medullary thyroid cancer (MTC) or Multiple Endocrine Neoplasia syndrome type 2 (MEN 2).  Tell your doctor if you have or have had problems with your kidneys or pancreas.  Talk to your doctor if you are pregnant, planning to become pregnant, or breastfeeding. Stop using Mounjaro and get medical help right away if you have severe pain in your stomach area that will not go away, or if you notice any signs/symptoms of an allergic reaction (rash, hives, difficulty breathing, etc.).    What are things that I should be cautious of?  Be cautious of any side effects from this medication. Talk to your doctor if any new ones develop or aren't getting better.    What are some  medications that can interact with this one?  Taking Mounjaro with other medications that also lower your blood sugar such as insulin and glipizide/glimepiride/glyburide may increase the risk of low blood sugar. Your doctor may reduce the dose of these medications when you start Mounjaro to minimize low blood sugars.  Always tell your doctor or pharmacist immediately if you start taking any new medications, including over-the-counter medications, vitamins, and herbal supplements.        Medication Storage/Handling    How should I handle this medication?  Keep this medication out of reach of pets/children and keep the pen capped when not in use.  Do not share your medicine pens with others.    How does this medication need to be stored?  Store Mounjaro in the refrigerator. Do not freeze and do not use if Mounjaro has been frozen.  You may store your Mounjaro pens at room temperature for up to 21 days.  Protect from excessive heat and sunlight.  Keep in the original carton until time of administration.    How should I dispose of this medication?  Used Mounjaro pens should discarded after each use in an approved sharps container after use.  If you do not have a sharps container, you may use a household container made of heavy-duty plastic with a tight-fitting lid that is leak resistant (e.g., heavy-duty plastic laundry detergent bottle). If your doctor decides to stop this medication, take to your local police station for proper disposal. Some pharmacies also have take-back bins for medication drop-off.       Resources/Support    How can I remind myself to take this medication?  You can download reminder apps to help you manage your refills. You may also set an alarm on your phone to remind you.     Is financial support available?   [employer] can provide co-pay cards if you have commercial insurance or patient assistance if you have Medicare or no insurance.     Which vaccines are recommended for me?  Talk to your  doctor about these vaccines: Flu, Coronavirus (COVID-19), Pneumococcal (pneumonia), Tdap, Hepatitis B, Zoster (shingles)         Adherence and Self-Administration  Adherence related to the patient's specialty therapy was discussed with the patient. The Adherence segment of this outreach has been reviewed and updated.     Is there a concern with patient's ability to self administer the medication correctly and without issue?: No  Were any potential barriers to adherence identified during the initial assessment or patient education?: No  Are there any concerns regarding the patient's understanding of the importance of medication adherence?: No  Methods for Supporting Patient Adherence and/or Self-Administration: Follow with fills and clinical assessments     Open Medication Therapy Problems  No medication therapy recommendations to display    Goals of Therapy  Goals related to the patient's specialty therapy were discussed with the patient. The Patient Goals segment of this outreach has been reviewed and updated.   Goals Addressed Today        Specialty Pharmacy General Goal      A1c < 7%    Lab Results   Component Value Date    HGBA1C 6.70 (H) 03/13/2024    HGBA1C 6.0 (H) 08/11/2023    HGBA1C 6.90 (H) 03/06/2023    HGBA1C 6.9 (H) 02/07/2022    HGBA1C 5.7 (H) 04/17/2021                 Reassessment Plan & Follow-Up  1. Medication Therapy Changes: No changes  2. Related Plans, Therapy Recommendations, or Therapy Problems to Be Addressed: Issue filling with [facility] pharmacy, patient is able to appeal and continue filling at [facility] pharmacy. The number for [employer] appeal is ([phone]).   3. Pharmacist to perform regular assessments no more than (6) months from the previous assessment.  4. Care Coordinator to set up future refill outreaches, coordinate prescription delivery, and escalate clinical questions to pharmacist.  5. Welcome information and patient satisfaction survey to be sent by specialty pharmacy team  with patient's initial fill.    Attestation  Therapeutic appropriateness: Appropriate   I attest the patient was actively involved in and has agreed to the above plan of care. If the prescribed therapy is at any point deemed not appropriate based on the current or future assessments, a consultation will be initiated with the patient's specialty care provider to determine the best course of action. The revised plan of therapy will be documented along with any required assessments and/or additional patient education provided.     Nyasia Abarca PharmD, BESSIE  Clinical Specialty Pharmacist, Endocrinology  9/13/2024  11:03 EDT      Specialty Pharmacy Patient Management Program  Endocrinology Refill Outreach      Jamie is a 48 y.o. male contacted today regarding refills of his medication(s).    Specialty medication(s) and dose(s) confirmed: Mounjaro 10 mg      Delivery Questions      Flowsheet Row Most Recent Value   Delivery method  at Pharmacy   Number of medications in delivery 2   Medication(s) being filled and delivered Losartan Potassium (Angiotensin II Receptor Antagonists), Tirzepatide   Doses left of specialty medications 1 pen   Copay verified? Yes   Copay amount $13.56   Copay form of payment Pay at pickup   Signature Required No            Follow-Up: Based on day supply for future fills    Nyasia Abarca PharmD, MM   Clinical Speciality Pharmacist, Endocrinology   9/13/2024  11:03 EDT

## 2024-09-14 LAB
ALBUMIN SERPL-MCNC: 3.9 G/DL (ref 3.5–5.2)
ALBUMIN/CREAT UR: 1414 MG/G CREAT (ref 0–29)
ALBUMIN/GLOB SERPL: 1.3 G/DL
ALP SERPL-CCNC: 59 U/L (ref 39–117)
ALT SERPL-CCNC: 39 U/L (ref 1–41)
AST SERPL-CCNC: 29 U/L (ref 1–40)
BILIRUB SERPL-MCNC: 0.5 MG/DL (ref 0–1.2)
BUN SERPL-MCNC: 15 MG/DL (ref 6–20)
BUN/CREAT SERPL: 11.5 (ref 7–25)
CALCIUM SERPL-MCNC: 9 MG/DL (ref 8.6–10.5)
CHLORIDE SERPL-SCNC: 105 MMOL/L (ref 98–107)
CHOLEST SERPL-MCNC: 113 MG/DL (ref 0–200)
CO2 SERPL-SCNC: 26 MMOL/L (ref 22–29)
CREAT SERPL-MCNC: 1.3 MG/DL (ref 0.76–1.27)
CREAT UR-MCNC: 100.1 MG/DL
EGFRCR SERPLBLD CKD-EPI 2021: 67.3 ML/MIN/1.73
GLOBULIN SER CALC-MCNC: 3.1 GM/DL
GLUCOSE SERPL-MCNC: 132 MG/DL (ref 65–99)
HBA1C MFR BLD: 6.6 % (ref 4.8–5.6)
HDLC SERPL-MCNC: 41 MG/DL (ref 40–60)
IMP & REVIEW OF LAB RESULTS: NORMAL
LDLC SERPL CALC-MCNC: 54 MG/DL (ref 0–100)
MICROALBUMIN UR-MCNC: 1415.8 UG/ML
POTASSIUM SERPL-SCNC: 4.4 MMOL/L (ref 3.5–5.2)
PROT SERPL-MCNC: 7 G/DL (ref 6–8.5)
SODIUM SERPL-SCNC: 142 MMOL/L (ref 136–145)
TRIGL SERPL-MCNC: 96 MG/DL (ref 0–150)
VLDLC SERPL CALC-MCNC: 18 MG/DL (ref 5–40)

## 2024-09-16 DIAGNOSIS — E11.65 TYPE 2 DIABETES MELLITUS WITH HYPERGLYCEMIA, WITHOUT LONG-TERM CURRENT USE OF INSULIN: Primary | ICD-10-CM

## 2024-09-17 ENCOUNTER — TELEPHONE (OUTPATIENT)
Dept: ENDOCRINOLOGY | Age: 49
End: 2024-09-17
Payer: COMMERCIAL

## 2024-09-19 ENCOUNTER — OFFICE VISIT (OUTPATIENT)
Dept: FAMILY MEDICINE CLINIC | Facility: CLINIC | Age: 49
End: 2024-09-19
Payer: COMMERCIAL

## 2024-09-19 VITALS
TEMPERATURE: 97.4 F | DIASTOLIC BLOOD PRESSURE: 90 MMHG | WEIGHT: 238.6 LBS | BODY MASS INDEX: 39.75 KG/M2 | OXYGEN SATURATION: 97 % | HEART RATE: 110 BPM | HEIGHT: 65 IN | SYSTOLIC BLOOD PRESSURE: 130 MMHG

## 2024-09-19 DIAGNOSIS — Z78.9 KETOGENIC DIET: ICD-10-CM

## 2024-09-19 DIAGNOSIS — R11.0 NAUSEA: Primary | Chronic | ICD-10-CM

## 2024-09-19 PROCEDURE — 99213 OFFICE O/P EST LOW 20 MIN: CPT | Performed by: FAMILY MEDICINE

## 2024-09-19 RX ORDER — ONDANSETRON 4 MG/1
4 TABLET, FILM COATED ORAL EVERY 8 HOURS PRN
Qty: 12 TABLET | Refills: 1 | Status: SHIPPED | OUTPATIENT
Start: 2024-09-19

## 2024-10-02 NOTE — PROGRESS NOTES
"Maria Del Rosario Suárez is a 48 y.o. male.     CC: Back Stiffness    History of Present Illness     Patient comes in today reporting some stiffness of his lower back that comes/goes for approximately 4 days now.  Patient reports no injury know, although has been doing crunches for exercise to build his core strength.      The following portions of the patient's history were reviewed and updated as appropriate: allergies, current medications, past family history, past medical history, past social history, past surgical history, and problem list.    Review of Systems   Constitutional:  Negative for activity change, chills and fever.   Respiratory:  Negative for cough.    Cardiovascular:  Negative for chest pain.   Musculoskeletal:  Positive for back pain.   Psychiatric/Behavioral:  Negative for dysphoric mood.        /82   Pulse 106   Temp 98.4 °F (36.9 °C) (Oral)   Resp 20   Ht 165.1 cm (65\")   Wt 106 kg (234 lb)   SpO2 98%   BMI 38.94 kg/m²     Objective   Physical Exam  Vitals and nursing note reviewed.   Constitutional:       General: He is not in acute distress.     Appearance: He is well-developed.   Pulmonary:      Effort: Pulmonary effort is normal.   Musculoskeletal:        Back:       Comments: Area of tenderness; range of motion full   Neurological:      Mental Status: He is alert and oriented to person, place, and time.   Psychiatric:         Behavior: Behavior normal.         Thought Content: Thought content normal.         Assessment & Plan   Diagnoses and all orders for this visit:    1. Back strain, initial encounter (Primary)  -     baclofen (LIORESAL) 20 MG tablet; Take 1 tablet by mouth Every Night.  Dispense: 15 tablet; Refill: 1  -     diclofenac (VOLTAREN) 75 MG EC tablet; Take 1 tablet by mouth 2 (Two) Times a Day.  Dispense: 30 tablet; Refill: 1    Heat/stretching/good water intake discussed.  Recommend planks versus crunches going forward.           "

## 2024-10-03 ENCOUNTER — OFFICE VISIT (OUTPATIENT)
Dept: FAMILY MEDICINE CLINIC | Facility: CLINIC | Age: 49
End: 2024-10-03
Payer: COMMERCIAL

## 2024-10-03 VITALS
SYSTOLIC BLOOD PRESSURE: 126 MMHG | DIASTOLIC BLOOD PRESSURE: 82 MMHG | HEIGHT: 65 IN | BODY MASS INDEX: 38.99 KG/M2 | HEART RATE: 106 BPM | WEIGHT: 234 LBS | RESPIRATION RATE: 20 BRPM | OXYGEN SATURATION: 98 % | TEMPERATURE: 98.4 F

## 2024-10-03 DIAGNOSIS — S39.012A BACK STRAIN, INITIAL ENCOUNTER: Primary | ICD-10-CM

## 2024-10-03 PROCEDURE — 99213 OFFICE O/P EST LOW 20 MIN: CPT | Performed by: FAMILY MEDICINE

## 2024-10-03 RX ORDER — DICLOFENAC SODIUM 75 MG/1
75 TABLET, DELAYED RELEASE ORAL 2 TIMES DAILY
Qty: 30 TABLET | Refills: 1 | Status: SHIPPED | OUTPATIENT
Start: 2024-10-03

## 2024-10-03 RX ORDER — BACLOFEN 20 MG/1
20 TABLET ORAL NIGHTLY
Qty: 15 TABLET | Refills: 1 | Status: SHIPPED | OUTPATIENT
Start: 2024-10-03

## 2024-10-14 ENCOUNTER — SPECIALTY PHARMACY (OUTPATIENT)
Dept: ENDOCRINOLOGY | Age: 49
End: 2024-10-14
Payer: COMMERCIAL

## 2024-10-14 NOTE — PROGRESS NOTES
Specialty Pharmacy Refill Coordination Note     Jamie is a 49 y.o. male contacted today regarding refills of  1 specialty medication(s).    Reviewed and verified with patient:       Specialty medication(s) and dose(s) confirmed: yes    Refill Questions      Flowsheet Row Most Recent Value   Changes to allergies? No   Changes to medications? No   New conditions or infections since last clinic visit No   Unplanned office visit, urgent care, ED, or hospital admission in the last 4 weeks  No   How does patient/caregiver feel medication is working? Good   Financial problems or insurance changes  No   Since the previous refill, were any specialty medication doses or scheduled injections missed or delayed?  No   Does this patient require a clinical escalation to a pharmacist? No            Delivery Questions      Flowsheet Row Most Recent Value   Delivery method  at Pharmacy   Delivery address verified with patient/caregiver? Yes   Delivery address Home   Number of medications in delivery 1   Medication(s) being filled and delivered Tirzepatide (Mounjaro)   Doses left of specialty medications 1   Copay verified? Yes   Copay amount 25   Copay form of payment Pay at pickup   Signature Required No                   Follow-up: 25 day(s)     Estela Mendoza, Pharmacy Technician  Specialty Pharmacy Technician

## 2024-10-16 DIAGNOSIS — J45.20 MILD INTERMITTENT ASTHMA, UNCOMPLICATED: ICD-10-CM

## 2024-10-16 RX ORDER — ALBUTEROL SULFATE 90 UG/1
INHALANT RESPIRATORY (INHALATION)
Qty: 8.5 G | Refills: 5 | Status: SHIPPED | OUTPATIENT
Start: 2024-10-16

## 2024-11-06 ENCOUNTER — SPECIALTY PHARMACY (OUTPATIENT)
Dept: ENDOCRINOLOGY | Age: 49
End: 2024-11-06
Payer: COMMERCIAL

## 2024-11-06 NOTE — PROGRESS NOTES
Specialty Pharmacy Patient Management Program  Endocrinology Refill Outreach      Jamie is a 49 y.o. male contacted today regarding refills of his medication(s).    Specialty medication(s) and dose(s) confirmed: mounjaro    Refill Questions      Flowsheet Row Most Recent Value   Changes to allergies? No   Changes to medications? No   New conditions or infections since last clinic visit No   Unplanned office visit, urgent care, ED, or hospital admission in the last 4 weeks  No   How does patient/caregiver feel medication is working? Good   Financial problems or insurance changes  No   Since the previous refill, were any specialty medication doses or scheduled injections missed or delayed?  No   Does this patient require a clinical escalation to a pharmacist? No          Delivery Questions      Flowsheet Row Most Recent Value   Delivery method  at Pharmacy   Number of medications in delivery 2   Medication(s) being filled and delivered Tirzepatide (Mounjaro), Losartan Potassium (COZAAR)   Copay verified? Yes   Copay amount 20.16   Copay form of payment Pay at pickup            Follow-Up: one month    Sena Sarabia, PharmD  Clinical Specialty Pharmacist, Endocrinology  11/6/2024  10:05 EST

## 2024-11-06 NOTE — PROGRESS NOTES
"Maria Del Rosario Suárez is a 49 y.o. male.     CC: Renal Discussion    History of Present Illness     Pt comes in today after his endocrinologist sent him to a nephrologist and was most recently seen on 10/31/2024, and is here today to discuss this.  He was diagnosed with stage IIIa chronic kidney disease at that visit.      The following portions of the patient's history were reviewed and updated as appropriate: allergies, current medications, past family history, past medical history, past social history, past surgical history, and problem list.    Review of Systems   Constitutional:  Negative for activity change, chills and fever.   Respiratory:  Negative for cough.    Cardiovascular:  Negative for chest pain.   Psychiatric/Behavioral:  Negative for dysphoric mood.        /76   Pulse 80   Temp 97.9 °F (36.6 °C) (Oral)   Resp 16   Ht 167.6 cm (66\")   Wt 99.8 kg (220 lb)   SpO2 96%   BMI 35.51 kg/m²     Objective   Physical Exam  Vitals and nursing note reviewed.   Constitutional:       General: He is not in acute distress.     Appearance: He is well-developed.   Cardiovascular:      Rate and Rhythm: Normal rate and regular rhythm.   Pulmonary:      Effort: Pulmonary effort is normal.      Breath sounds: Normal breath sounds.   Neurological:      Mental Status: He is alert and oriented to person, place, and time.   Psychiatric:         Behavior: Behavior normal.         Thought Content: Thought content normal.     Notes and labs/ U/S from the nephrologist reviewed by me at today's visit.    Assessment & Plan   Diagnoses and all orders for this visit:    1. Stage 3a chronic kidney disease (Primary)    Good DM control, avoidance of NSAIDS,. And good water intake stressed. Continue Losartan for BP control and renal protection.             "

## 2024-11-07 ENCOUNTER — OFFICE VISIT (OUTPATIENT)
Dept: FAMILY MEDICINE CLINIC | Facility: CLINIC | Age: 49
End: 2024-11-07
Payer: COMMERCIAL

## 2024-11-07 VITALS
BODY MASS INDEX: 35.36 KG/M2 | HEART RATE: 80 BPM | HEIGHT: 66 IN | DIASTOLIC BLOOD PRESSURE: 76 MMHG | WEIGHT: 220 LBS | SYSTOLIC BLOOD PRESSURE: 126 MMHG | OXYGEN SATURATION: 96 % | TEMPERATURE: 97.9 F | RESPIRATION RATE: 16 BRPM

## 2024-11-07 DIAGNOSIS — N18.31 STAGE 3A CHRONIC KIDNEY DISEASE: Primary | ICD-10-CM

## 2024-11-07 PROCEDURE — 99213 OFFICE O/P EST LOW 20 MIN: CPT | Performed by: FAMILY MEDICINE

## 2024-11-20 RX ORDER — LOSARTAN POTASSIUM 100 MG/1
1 TABLET ORAL DAILY
COMMUNITY
Start: 2024-11-18 | End: 2024-11-21 | Stop reason: SDUPTHER

## 2024-11-20 NOTE — PROGRESS NOTES
Chief Complaint:   Chief Complaint   Patient presents with    follow up nephrology      To discuss meds  - BMI DUE   Per pt        Jamie Suárez 49 y.o. male who presents today for Medical Management of the below listed issues. He  has a problem list of   Patient Active Problem List   Diagnosis    Disorder of rotator cuff    Shoulder pain    GERD (gastroesophageal reflux disease)    Cervical stenosis of spinal canal    Left cervical radiculopathy    SANA (obstructive sleep apnea)    Class 2 severe obesity due to excess calories with serious comorbidity and body mass index (BMI) of 35.0 to 35.9 in adult    Restless legs syndrome (RLS)    New onset type 2 diabetes mellitus    Acute respiratory failure due to COVID-19    Polycythemia    Diabetes mellitus    Viral upper respiratory illness    Acute cough    Dysphagia    Encounter for screening for malignant neoplasm of colon    SOB (shortness of breath)    Dysphagia    Primary hypertension    Pure hypercholesterolemia    Umbilical hernia without obstruction and without gangrene    Stage 3a chronic kidney disease   .  Since the last visit, He has overall felt well.  He saw his nephrologist recently, who increased his losartan to 100 mg daily.  he has been compliant with   Current Outpatient Medications:     losartan (COZAAR) 100 MG tablet, Take 1 tablet by mouth Daily., Disp: 90 tablet, Rfl: 3    albuterol sulfate  (90 Base) MCG/ACT inhaler, INAHLE 2 PUFFS BY MOUTH EVERY 4 HOURS AS NEEDED FOR WHEEZE, Disp: 8.5 g, Rfl: 5    baclofen (LIORESAL) 20 MG tablet, Take 1 tablet by mouth Every Night., Disp: 15 tablet, Rfl: 1    cetirizine (zyrTEC) 10 MG tablet, TAKE 1 TABLET BY MOUTH EVERY DAY, Disp: 90 tablet, Rfl: 1    Insulin Pen Needle 32G X 4 MM misc, Use QD, Disp: 20 each, Rfl: 3    metFORMIN (GLUCOPHAGE) 500 MG tablet, Take 2 tablets by mouth Daily With Breakfast., Disp: 180 tablet, Rfl: 1    omeprazole (priLOSEC) 40 MG capsule, TAKE 1 CAPSULE BY MOUTH DAILY FOR  "GERD, Disp: 90 capsule, Rfl: 3    ondansetron (Zofran) 4 MG tablet, Take 1 tablet by mouth Every 8 (Eight) Hours As Needed for Nausea or Vomiting., Disp: 12 tablet, Rfl: 1    rosuvastatin (CRESTOR) 10 MG tablet, TAKE 1 TABLET BY MOUTH DAILY FOR CHOLESTEROL, Disp: 90 tablet, Rfl: 3    Tirzepatide (Mounjaro) 10 MG/0.5ML solution auto-injector, Inject 0.5 mL under the skin into the appropriate area as directed 1 (One) Time Per Week., Disp: 6 mL, Rfl: 0.  He denies medication side effects.    All of the other chronic condition(s) listed above are stable w/o issues.    /86   Pulse 80   Temp 98.1 °F (36.7 °C) (Oral)   Resp 16   Ht 167.6 cm (66\")   Wt 97.1 kg (214 lb)   SpO2 98%   BMI 34.54 kg/m²     Results for orders placed or performed during the hospital encounter of 10/27/24   POC Glucose Once    Collection Time: 10/27/24  6:43 PM    Specimen: Blood   Result Value Ref Range    Glucose 118 70 - 130 mg/dL             The following portions of the patient's history were reviewed and updated as appropriate: allergies, current medications, past family history, past medical history, past social history, past surgical history, and problem list.    Review of Systems   Constitutional:  Negative for activity change, chills and fever.   Respiratory:  Negative for cough.    Cardiovascular:  Negative for chest pain.   Psychiatric/Behavioral:  Negative for dysphoric mood.        Objective             Physical Exam  Vitals and nursing note reviewed.   Constitutional:       General: He is not in acute distress.     Appearance: He is well-developed.   Pulmonary:      Effort: Pulmonary effort is normal.   Neurological:      Mental Status: He is alert and oriented to person, place, and time.   Psychiatric:         Behavior: Behavior normal.         Thought Content: Thought content normal.     Nephrology note reviewed at today's visit.        Diagnoses and all orders for this visit:    1. Primary hypertension (Primary)  -    "  losartan (COZAAR) 100 MG tablet; Take 1 tablet by mouth Daily.  Dispense: 90 tablet; Refill: 3

## 2024-11-21 ENCOUNTER — OFFICE VISIT (OUTPATIENT)
Dept: FAMILY MEDICINE CLINIC | Facility: CLINIC | Age: 49
End: 2024-11-21
Payer: COMMERCIAL

## 2024-11-21 VITALS
HEIGHT: 66 IN | WEIGHT: 214 LBS | TEMPERATURE: 98.1 F | HEART RATE: 80 BPM | SYSTOLIC BLOOD PRESSURE: 128 MMHG | BODY MASS INDEX: 34.39 KG/M2 | RESPIRATION RATE: 16 BRPM | OXYGEN SATURATION: 98 % | DIASTOLIC BLOOD PRESSURE: 86 MMHG

## 2024-11-21 DIAGNOSIS — I10 PRIMARY HYPERTENSION: Primary | Chronic | ICD-10-CM

## 2024-11-21 PROCEDURE — 99213 OFFICE O/P EST LOW 20 MIN: CPT | Performed by: FAMILY MEDICINE

## 2024-11-21 RX ORDER — LOSARTAN POTASSIUM 100 MG/1
100 TABLET ORAL DAILY
Qty: 90 TABLET | Refills: 3 | Status: SHIPPED | OUTPATIENT
Start: 2024-11-21

## 2024-12-09 DIAGNOSIS — E11.65 TYPE 2 DIABETES MELLITUS WITH HYPERGLYCEMIA, WITHOUT LONG-TERM CURRENT USE OF INSULIN: ICD-10-CM

## 2024-12-09 RX ORDER — TIRZEPATIDE 10 MG/.5ML
10 INJECTION, SOLUTION SUBCUTANEOUS WEEKLY
Qty: 6 ML | Refills: 0 | Status: SHIPPED | OUTPATIENT
Start: 2024-12-09

## 2024-12-09 NOTE — TELEPHONE ENCOUNTER
Rx Refill Note  Requested Prescriptions     Pending Prescriptions Disp Refills    Tirzepatide (Mounjaro) 10 MG/0.5ML solution auto-injector 6 mL 0     Sig: Inject 0.5 mL under the skin into the appropriate area as directed 1 (One) Time Per Week.      Last office visit with prescribing clinician: 9/13/2024   Last telemedicine visit with prescribing clinician: Visit date not found   Next office visit with prescribing clinician: 3/14/2025                         Would you like a call back once the refill request has been completed: [] Yes [] No    If the office needs to give you a call back, can they leave a voicemail: [] Yes [] No    Yesenia Aguilar  12/09/24, 14:22 EST

## 2024-12-10 ENCOUNTER — SPECIALTY PHARMACY (OUTPATIENT)
Dept: ENDOCRINOLOGY | Age: 49
End: 2024-12-10
Payer: COMMERCIAL

## 2024-12-10 NOTE — PROGRESS NOTES
Specialty Pharmacy Refill Coordination Note     Jamie is a 49 y.o. male contacted today regarding refills of  1 specialty medication(s).    Reviewed and verified with patient:       Specialty medication(s) and dose(s) confirmed: yes    Refill Questions      Flowsheet Row Most Recent Value   Changes to allergies? No   Changes to medications? No   New conditions or infections since last clinic visit No   Unplanned office visit, urgent care, ED, or hospital admission in the last 4 weeks  No   How does patient/caregiver feel medication is working? Good   Financial problems or insurance changes  No   Since the previous refill, were any specialty medication doses or scheduled injections missed or delayed?  No   Does this patient require a clinical escalation to a pharmacist? No            Delivery Questions      Flowsheet Row Most Recent Value   Delivery method  at Pharmacy   Copay verified? Yes   Copay amount $12.00   Copay form of payment Pay at pickup   Signature Required No                   Follow-up: 23 day(s)     Carrie Davila, Pharmacy Technician  Specialty Pharmacy Technician

## 2025-01-08 ENCOUNTER — SPECIALTY PHARMACY (OUTPATIENT)
Dept: ENDOCRINOLOGY | Age: 50
End: 2025-01-08
Payer: COMMERCIAL

## 2025-01-08 NOTE — PROGRESS NOTES
Specialty Pharmacy Refill Coordination Note     Jamie is a 49 y.o. male contacted today regarding refills of  1 specialty medication(s).    Reviewed and verified with patient:       Specialty medication(s) and dose(s) confirmed: yes    Refill Questions      Flowsheet Row Most Recent Value   Changes to allergies? No   Changes to medications? No   New conditions or infections since last clinic visit No   Unplanned office visit, urgent care, ED, or hospital admission in the last 4 weeks  No   How does patient/caregiver feel medication is working? Good   Financial problems or insurance changes  No   Since the previous refill, were any specialty medication doses or scheduled injections missed or delayed?  No   Does this patient require a clinical escalation to a pharmacist? No            Delivery Questions      Flowsheet Row Most Recent Value   Delivery method  at Pharmacy   Number of medications in delivery 1   Medication(s) being filled and delivered Tirzepatide (Mounjaro)   Doses left of specialty medications 1 week   Copay verified? Yes   Copay amount $12.00   Copay form of payment Pay at pickup   Signature Required No                   Follow-up: 23 day(s)     Carrie Davila, Pharmacy Technician  Specialty Pharmacy Technician

## 2025-01-10 DIAGNOSIS — E11.65 TYPE 2 DIABETES MELLITUS WITH HYPERGLYCEMIA, WITHOUT LONG-TERM CURRENT USE OF INSULIN: ICD-10-CM

## 2025-01-10 NOTE — TELEPHONE ENCOUNTER
Rx Refill Note  Requested Prescriptions     Pending Prescriptions Disp Refills    metFORMIN (GLUCOPHAGE) 500 MG tablet 180 tablet 1     Sig: Take 2 tablets by mouth Daily With Breakfast.      Last office visit with prescribing clinician: 9/13/2024   Last telemedicine visit with prescribing clinician: Visit date not found   Next office visit with prescribing clinician: 3/14/2025                         Would you like a call back once the refill request has been completed: [] Yes [] No    If the office needs to give you a call back, can they leave a voicemail: [] Yes [] No    Shyla Gutierrez MA  01/10/25, 12:22 EST

## 2025-01-13 RX ORDER — DAPAGLIFLOZIN 10 MG/1
10 TABLET, FILM COATED ORAL
Qty: 90 TABLET | Refills: 0 | Status: SHIPPED | OUTPATIENT
Start: 2025-01-13 | End: 2025-01-15

## 2025-01-15 DIAGNOSIS — I10 PRIMARY HYPERTENSION: Chronic | ICD-10-CM

## 2025-01-15 DIAGNOSIS — E11.65 TYPE 2 DIABETES MELLITUS WITH HYPERGLYCEMIA, WITHOUT LONG-TERM CURRENT USE OF INSULIN: ICD-10-CM

## 2025-01-15 RX ORDER — DAPAGLIFLOZIN 10 MG/1
1 TABLET, FILM COATED ORAL DAILY
Qty: 90 TABLET | Refills: 0 | Status: SHIPPED | OUTPATIENT
Start: 2025-01-15

## 2025-01-15 NOTE — TELEPHONE ENCOUNTER
Caller: Jamie Suárez    Relationship: Self    Best call back number: [phone]     Requested Prescriptions:   Requested Prescriptions     Pending Prescriptions Disp Refills    metFORMIN (GLUCOPHAGE) 500 MG tablet 180 tablet 0     Sig: Take 2 tablets by mouth Daily With Breakfast.    losartan (COZAAR) 100 MG tablet 90 tablet 3     Sig: Take 1 tablet by mouth Daily.    dapagliflozin Propanediol (Farxiga) 10 MG tablet 90 tablet 0     Sig: Take 1 Tablet by mouth Daily in the Morning        Pharmacy where request should be sent: [id number] [address] AT [facility] - [phone] [facility] [phone] FX     Last office visit with prescribing clinician: 11/21/2024   Last telemedicine visit with prescribing clinician: Visit date not found   Next office visit with prescribing clinician: Visit date not found     Additional details provided by patient: OUT OF MEDICATION NEED REFILL ASAP     Does the patient have less than a 3 day supply:  [x] Yes  [] No    Would you like a call back once the refill request has been completed: [] Yes [x] No    If the office needs to give you a call back, can they leave a voicemail: [] Yes [x] No    Evaristo Rosenthal Rep   01/15/25 11:31 EST

## 2025-01-16 ENCOUNTER — TELEPHONE (OUTPATIENT)
Dept: ENDOCRINOLOGY | Age: 50
End: 2025-01-16
Payer: COMMERCIAL

## 2025-01-16 DIAGNOSIS — E11.65 TYPE 2 DIABETES MELLITUS WITH HYPERGLYCEMIA, WITHOUT LONG-TERM CURRENT USE OF INSULIN: ICD-10-CM

## 2025-01-16 RX ORDER — DAPAGLIFLOZIN 10 MG/1
10 TABLET, FILM COATED ORAL
Qty: 90 TABLET | Refills: 0 | OUTPATIENT
Start: 2025-01-16

## 2025-01-16 RX ORDER — LOSARTAN POTASSIUM 100 MG/1
100 TABLET ORAL DAILY
Qty: 90 TABLET | Refills: 3 | OUTPATIENT
Start: 2025-01-16

## 2025-01-16 NOTE — TELEPHONE ENCOUNTER
Caller: Jamie Suárez    Relationship: Self    Best call back number: [phone]     What is the best time to reach you: ANYTIME    Who are you requesting to speak with (clinical staff, provider,  specific staff member): DR HART    What was the call regarding: PATIENT STATED HE HAS BEEN OUT OF THESE MEDICATIONS FOR TWO WEEKS AND IS REQUESTING A REFILLS ASAP.

## 2025-01-16 NOTE — TELEPHONE ENCOUNTER
Spoke to patient  Metformin was not stopped  Looks like refill request came from [facility]  Resent to [facility]   Patient understood

## 2025-01-16 NOTE — TELEPHONE ENCOUNTER
PT CAME IN NEEDING CLARIFICATION ON IF HE SHOULD STOP TAKING BELOW MED DUE TO JULIETTE CANCELLING THE ORDER SOON AFTER IT WAS SENT TO THE PHARMACY DOWNSTAIRS.    metFORMIN (GLUCOPHAGE) 500 MG tablet [02446]     PLEASE CALL AND ADVISE, THANK YOU.

## 2025-01-31 ENCOUNTER — HOSPITAL ENCOUNTER (EMERGENCY)
Facility: HOSPITAL | Age: 50
Discharge: HOME | End: 2025-02-01
Attending: EMERGENCY MEDICINE
Payer: COMMERCIAL

## 2025-01-31 VITALS
SYSTOLIC BLOOD PRESSURE: 145 MMHG | RESPIRATION RATE: 15 BRPM | TEMPERATURE: 98 F | WEIGHT: 215 LBS | HEART RATE: 92 BPM | DIASTOLIC BLOOD PRESSURE: 99 MMHG | HEIGHT: 66 IN | OXYGEN SATURATION: 96 % | BODY MASS INDEX: 34.55 KG/M2

## 2025-01-31 DIAGNOSIS — J01.00 ACUTE NON-RECURRENT MAXILLARY SINUSITIS: Primary | ICD-10-CM

## 2025-01-31 LAB
FLUAV SUBTYP SPEC NAA+PROBE: NOT DETECTED
FLUBV RNA ISLT QL NAA+PROBE: NOT DETECTED
SARS-COV-2 RNA RESP QL NAA+PROBE: NOT DETECTED

## 2025-01-31 PROCEDURE — 99283 EMERGENCY DEPT VISIT LOW MDM: CPT

## 2025-01-31 PROCEDURE — 87636 SARSCOV2 & INF A&B AMP PRB: CPT

## 2025-02-01 RX ORDER — BROMPHENIRAMINE MALEATE, PSEUDOEPHEDRINE HYDROCHLORIDE, AND DEXTROMETHORPHAN HYDROBROMIDE 2; 30; 10 MG/5ML; MG/5ML; MG/5ML
5 SYRUP ORAL 4 TIMES DAILY PRN
Qty: 118 ML | Refills: 0 | Status: SHIPPED | OUTPATIENT
Start: 2025-02-01

## 2025-02-01 RX ADMIN — AMOXICILLIN AND CLAVULANATE POTASSIUM 1 TABLET: 875; 125 TABLET, FILM COATED ORAL at 00:26

## 2025-02-01 NOTE — FSED PROVIDER NOTE
Subjective   History of Present Illness  48yo male pmh significant htn/hyperlipidemia/dm2 presents ED c/o 2d hx maxillary/ethmoid sinus pressure/posterior neck pain/congestion/occasional cough.  ROS otherwise noncontributory.    History provided by:  Patient  URI  Presenting symptoms: congestion, cough, facial pain and rhinorrhea    Presenting symptoms: no ear pain and no sore throat    Associated symptoms: sinus pain        Review of Systems   Constitutional: Negative.    HENT:  Positive for congestion, rhinorrhea, sinus pressure and sinus pain. Negative for ear pain and sore throat.    Eyes: Negative.    Respiratory:  Positive for cough.    Cardiovascular: Negative.    Gastrointestinal: Negative.    Allergic/Immunologic: Negative for immunocompromised state.   All other systems reviewed and are negative.      Past Medical History:   Diagnosis Date    Bronchitis     COVID-19 03/2021    Diabetes mellitus     GERD (gastroesophageal reflux disease)     Reflux    Hiatal hernia     Nausea and vomiting 01/16/2019    Added automatically from request for surgery [id number]    Obesity     Sleep apnea     DOES NOT USE CPAP AT THIS TIME    Umbilical hernia        Allergies   Allergen Reactions    Morphine And Codeine Rash     He had a rash with morphine but no  Problem  with codiene       Past Surgical History:   Procedure Laterality Date    APPENDECTOMY      COLONOSCOPY W/ POLYPECTOMY N/A 04/20/2023    Procedure: COLONOSCOPY TO CECUM WITH COLD SNARE POLYPECTOMIES;  Surgeon: Lew Riddle MD;  Location: [facility] ENDOSCOPY;  Service: Gastroenterology;  Laterality: N/A;  pre: SCREENING  post: POLYPS    ENDOSCOPY N/A 01/21/2019    Procedure: ESOPHAGOGASTRODUODENOSCOPY with biopsy;  Surgeon: Lew Riddle MD;  Location: [facility] ENDOSCOPY;  Service: Gastroenterology    ENDOSCOPY N/A 04/20/2023    Procedure: ESOPHAGOGASTRODUODENOSCOPY WITH BIOPSIES;  Surgeon: Lew Riddle MD;  Location: [facility] ENDOSCOPY;   Service: Gastroenterology;  Laterality: N/A;  pre: HEARTBURN  post: HIATAL HERNIA    HERNIA REPAIR Bilateral     INGUINAL    TONSILLECTOMY      UMBILICAL HERNIA REPAIR N/A 2/27/2024    Procedure: UMBILICAL HERNIA REPAIR with mesh;  Surgeon: Aaron Correa MD;  Location: [facility] OR;  Service: General;  Laterality: N/A;       Family History   Problem Relation Age of Onset    Heart disease Mother     Hypertension Mother     Diabetes Father     Malig Hyperthermia Neg Hx        Social History     Socioeconomic History    Marital status: Single    Number of children: 5   Tobacco Use    Smoking status: Never    Smokeless tobacco: Never   Vaping Use    Vaping status: Never Used   Substance and Sexual Activity    Alcohol use: No    Drug use: No    Sexual activity: Defer           Objective   Physical Exam  Vitals and nursing note reviewed.   Constitutional:       Appearance: Normal appearance.   HENT:      Head: Normocephalic and atraumatic.      Right Ear: Tympanic membrane, ear canal and external ear normal.      Left Ear: Tympanic membrane, ear canal and external ear normal.      Nose: Nose normal. No rhinorrhea.      Right Sinus: No maxillary sinus tenderness or frontal sinus tenderness.      Left Sinus: No maxillary sinus tenderness or frontal sinus tenderness.      Mouth/Throat:      Mouth: Mucous membranes are moist.      Pharynx: Oropharynx is clear. No oropharyngeal exudate or posterior oropharyngeal erythema.   Eyes:      Pupils: Pupils are equal, round, and reactive to light.   Neck:      Trachea: Trachea and phonation normal.      Meningeal: Brudzinski's sign absent.   Cardiovascular:      Rate and Rhythm: Normal rate and regular rhythm.      Pulses: Normal pulses.      Heart sounds: Normal heart sounds. No murmur heard.     No friction rub. No gallop.   Pulmonary:      Effort: Pulmonary effort is normal. No respiratory distress.      Breath sounds: Normal breath sounds. No wheezing, rhonchi or rales.    Abdominal:      General: Bowel sounds are normal. There is no distension.      Palpations: Abdomen is soft.      Tenderness: There is no abdominal tenderness.   Musculoskeletal:         General: No swelling or deformity. Normal range of motion.      Cervical back: Full passive range of motion without pain, normal range of motion and neck supple. No rigidity.   Lymphadenopathy:      Cervical: No cervical adenopathy.   Skin:     General: Skin is warm and dry.   Neurological:      General: No focal deficit present.      Mental Status: He is alert and oriented to person, place, and time.         Procedures           ED Course      Labs Reviewed   COVID-19 AND FLU A/B, NP SWAB IN TRANSPORT MEDIA 1 HR TAT - Normal    Narrative:     Fact sheet for providers: https://www.fda.gov/media/976594/download    Fact sheet for patients: https://www.[employer].gov/media/671023/download    Test performed by PCR.                                          Medical Decision Making  Problems Addressed:  Acute non-recurrent maxillary sinusitis: complicated acute illness or injury    Risk  Prescription drug management.        Final diagnoses:   Acute non-recurrent maxillary sinusitis       ED Disposition  ED Disposition       ED Disposition   Discharge    Condition   Good    Comment   --               Harrison Broderick MD  [address] [id number] [phone]    In 3 days           Medication List        New Prescriptions      amoxicillin-clavulanate 875-125 MG per tablet  Commonly known as: AUGMENTIN  Take 1 tablet by mouth 2 (Two) Times a Day for 7 days.     brompheniramine-pseudoephedrine-DM 30-2-10 MG/5ML syrup  Take 5 mL by mouth 4 (Four) Times a Day As Needed for Congestion or Cough.               Where to Get Your Medications        These medications were sent to [id number] - [place], [place] - [address] AT [facility] [address] - [phone] [facility] [phone]   [address]  [place] [address]      Phone: [phone]   amoxicillin-clavulanate 875-125 MG per tablet  brompheniramine-pseudoephedrine-DM 30-2-10 MG/5ML syrup

## 2025-02-01 NOTE — ED NOTES
Pt presents to ER via POV with c/o sinus pressure, h/a x 2 days. Pt has had no relief with OTC pain relievers (Tylenol, Mucinex, Motrin). Pt denies chest pain, denies SOB, denies N/V. Reports unable to sleep due to pain and pressure.

## 2025-02-13 ENCOUNTER — SPECIALTY PHARMACY (OUTPATIENT)
Dept: ENDOCRINOLOGY | Age: 50
End: 2025-02-13
Payer: COMMERCIAL

## 2025-02-13 NOTE — PROGRESS NOTES
Specialty Pharmacy Patient Management Program  Refill Outreach     Jamie was contacted today regarding refills of their medication(s).    Refill Questions      Flowsheet Row Most Recent Value   Changes to allergies? No   Changes to medications? No   New conditions or infections since last clinic visit No   Unplanned office visit, urgent care, ED, or hospital admission in the last 4 weeks  No   How does patient/caregiver feel medication is working? Good   Financial problems or insurance changes  No   Since the previous refill, were any specialty medication doses or scheduled injections missed or delayed?  No   Does this patient require a clinical escalation to a pharmacist? No            Delivery Questions      Flowsheet Row Most Recent Value   Delivery method  at Pharmacy   Number of medications in delivery 2   Medication(s) being filled and delivered Losartan Potassium (COZAAR), Tirzepatide (Mounjaro)   Doses left of specialty medications 1 week   Copay verified? Yes   Copay amount $14.02   Copay form of payment Pay at pickup   Signature Required No                 Follow-up: 23 day(s)     Carrie Davila, Pharmacy Technician  2/13/2025  15:19 EST

## 2025-03-05 ENCOUNTER — SPECIALTY PHARMACY (OUTPATIENT)
Dept: ENDOCRINOLOGY | Age: 50
End: 2025-03-05
Payer: COMMERCIAL

## 2025-03-05 NOTE — PROGRESS NOTES
Specialty Pharmacy Patient Management Program  Reassessment     Jamie Suárez is a 49 y.o. male with diabetes mellitus and enrolled in the Patient Management program offered by [facility]. A follow-up outreach was conducted, including assessment of continued therapy appropriateness, medication adherence, and side effect incidence and management for Mounjaro (Tirzepatide) 10 mg/0.5ml auto-injector.     Changes to Insurance Coverage or Financial Support  none    Relevant Past Medical History and Comorbidities  Relevant medical history and concomitant health conditions were discussed with the patient. The patient's chart has been reviewed for relevant past medical history and comorbid health conditions and updated as necessary.   Past Medical History:   Diagnosis Date    Bronchitis     COVID-19 03/2021    Diabetes mellitus     GERD (gastroesophageal reflux disease)     Reflux    Hiatal hernia     Nausea and vomiting 01/16/2019    Added automatically from request for surgery [id number]    Obesity     Sleep apnea     DOES NOT USE CPAP AT THIS TIME    Umbilical hernia      Social History     Socioeconomic History    Marital status: Single    Number of children: 5   Tobacco Use    Smoking status: Never    Smokeless tobacco: Never   Vaping Use    Vaping status: Never Used   Substance and Sexual Activity    Alcohol use: No    Drug use: No    Sexual activity: Defer     Problem list reviewed by Carey Pak RPH on 3/5/2025 at 11:34 AM    Allergies  Known allergies and reactions were discussed with the patient. The patient's chart has been reviewed for allergy information and updated as necessary.   Allergies   Allergen Reactions    Morphine And Codeine Rash     He had a rash with morphine but no  Problem  with codiene     Allergies reviewed by Carey Pak RPH on 3/5/2025 at 11:34 AM    Relevant Laboratory Values  Lab Results   Component Value Date    GLUCOSE 132 (H) 09/13/2024    CALCIUM  9.0 09/13/2024     09/13/2024    K 4.4 09/13/2024    CO2 26.0 09/13/2024     09/13/2024    BUN 15 09/13/2024    CREATININE 1.30 (H) 09/13/2024    BCR 11.5 09/13/2024    ANIONGAP 10.1 07/28/2024     Lab Results   Component Value Date    WBC 11.72 (H) 07/28/2024    HGB 19.0 (H) 07/28/2024    HCT 57.3 (H) 07/28/2024    MCV 91.1 07/28/2024     07/28/2024    INR 1.4 05/04/2021     Lab Value Review  The above lab values have been reviewed; the following specialty medication(s) dose adjustment(s) are recommended: none.    Current Medication List  This medication list has been reviewed with the patient and evaluated for any interactions or necessary modifications/recommendations, and updated to include all prescription medications, OTC medications, and supplements the patient is currently taking. This list reflects what is contained in the patient's profile, which has also been marked as reviewed to communicate to other providers it is the most up to date version of the patient's current medication therapy.     Current Outpatient Medications:     albuterol sulfate  (90 Base) MCG/ACT inhaler, INAHLE 2 PUFFS BY MOUTH EVERY 4 HOURS AS NEEDED FOR WHEEZE, Disp: 8.5 g, Rfl: 5    baclofen (LIORESAL) 20 MG tablet, Take 1 tablet by mouth Every Night., Disp: 15 tablet, Rfl: 1    brompheniramine-pseudoephedrine-DM 30-2-10 MG/5ML syrup, Take 5 mL by mouth 4 (Four) Times a Day As Needed for Congestion or Cough., Disp: 118 mL, Rfl: 0    cetirizine (zyrTEC) 10 MG tablet, TAKE 1 TABLET BY MOUTH EVERY DAY, Disp: 90 tablet, Rfl: 1    dapagliflozin Propanediol 10 MG tablet, TAKE 1 TABLET BY MOUTH DAILY, Disp: 90 tablet, Rfl: 0    Insulin Pen Needle 32G X 4 MM misc, Use QD, Disp: 20 each, Rfl: 3    losartan (COZAAR) 100 MG tablet, Take 1 tablet by mouth Daily., Disp: 90 tablet, Rfl: 3    metFORMIN (GLUCOPHAGE) 500 MG tablet, Take 2 tablets by mouth Daily With Breakfast., Disp: 180 tablet, Rfl: 0    omeprazole  (priLOSEC) 40 MG capsule, TAKE 1 CAPSULE BY MOUTH DAILY FOR GERD, Disp: 90 capsule, Rfl: 3    ondansetron (Zofran) 4 MG tablet, Take 1 tablet by mouth Every 8 (Eight) Hours As Needed for Nausea or Vomiting., Disp: 12 tablet, Rfl: 1    rosuvastatin (CRESTOR) 10 MG tablet, TAKE 1 TABLET BY MOUTH DAILY FOR CHOLESTEROL, Disp: 90 tablet, Rfl: 3    Tirzepatide (Mounjaro) 10 MG/0.5ML solution auto-injector, Inject 0.5 mL under the skin into the appropriate area as directed 1 (One) Time Per Week., Disp: 6 mL, Rfl: 0  Medicines reviewed by Carey Pak [facility] on 3/5/2025 at 11:34 AM    Drug Interactions  None    Recommended Medication Assessments:  Aspirin: Not currently taking  Statin: rosuvastatin 10 mg tablet  ACEi/ARB: losartan 100 mg tablet    Adverse Drug Reactions  Medication tolerability: Tolerating with no to minimal ADRs  Medication plan: Continue therapy with normal follow-up  Plan for ADR Management: None reported    Hospitalizations and Urgent Care Since Last Assessment  Hospitalizations or Admissions: none  ED Visits:  Patient reported having to seek care due to pressure in the sinuses on 1/31/25. Treated for sinusitis, he feels much better now.  There is an ED visit listed in [facility], however, it states it was a well care exam on 10/27/24. Patient was also seen in the ER on 10/4/24 for a back sprain.   Urgent Office Visits: none     Adherence, Self-Administration, and Current Therapy Problems  Adherence related to the patient's specialty therapy was discussed with the patient. The Adherence segment of this outreach has been reviewed and updated.     Adherence Questions  Linked Medication(s) Assessed: Tirzepatide (Mounjaro)  On average, how many doses/injections does the patient miss per month?: 0  What are the identified reasons for non-adherence or missed doses? : no problems identified  What is the estimated medication adherence level?: % (Patient filled in [place] in Febraury 2025 - No doses where  missed)  Based on the patient/caregiver response and refill history, does this patient require an MTP to track adherence improvements?: no    Additional Barriers to Patient Self-Administration: None  Methods for Supporting Patient Self-Administration: None    Open Medication Therapy Problems  No medication therapy recommendations to display    Goals of Therapy  Goals related to the patient's specialty therapy were discussed with the patient. The Patient Goals segment of this outreach has been reviewed and updated.   Goals Addressed Today        Specialty Pharmacy General Goal      A1c < 7%    Lab Results   Component Value Date    HGBA1C 6.60 (H) 9/13/2024    HGBA1C 6.70 (H) 03/13/2024    HGBA1C 6.0 (H) 08/11/2023    HGBA1C 6.90 (H) 03/06/2023    HGBA1C 6.9 (H) 02/07/2022    HGBA1C 5.7 (H) 04/17/2021       3/5/25: Reviewed. Patient at goal. No side effects reported.   9/13/24: Patient enrolled in speciality pharmacy services, last labs show A1c at goal. Patient had appointment today and having labs drawn after appointment. Continue with current treatment and will follow up at reassessment.               Progress Toward Meeting Patient-Identified Goals of Therapy: On Track  New Patient-Identified Goals, If Applicable:     Progress Toward Meeting Clinical Goals or Therapeutic Targets: On Track  New Clinical Goals or Therapeutic Targets, If Applicable:     Quality of Life Assessment   Quality of Life related to the patient's enrollment in the patient management program and services provided was discussed with the patient. The QOL segment of this outreach has been reviewed and updated.  Quality of Life Improvement Scale: 8-Moderately better    Reassessment Plan & Follow-Up  Medication Therapy Changes: none  Additional Plans, Therapy Recommendations, or Therapy Problems to Be Addressed: none   Patient asked about increasing his Mounjaro dose. He wants to lose a few more pounds. He has changed his diet and avoids sugar  much better than in the past. He is making sure to get enough protein in his diet. He reports no issues with constipation, injection site reactions or nausea/vomiting or stomach pain. He plans on discussing the increase with his provider as his next visit.   Pharmacist to perform regular reassessments no more than (6) months from the previous assessment.  Care Coordinator to set up future refill outreaches, coordinate prescription delivery, and escalate clinical questions to pharmacist.     Attestation  I attest the patient was actively involved in and has agreed to the above plan of care. I attest that the specialty medication(s) addressed above are appropriate for the patient based on my reassessment. If the prescribed therapy is at any point deemed not appropriate based on the current or future assessments, a consultation will be initiated with the patient's specialty care provider to determine the best course of action. The revised plan of therapy will be documented along with any required assessments and/or additional patient education provided.     Electronically signed by Carey Pak RPH, 03/05/25, 11:24 AM EST.

## 2025-03-14 ENCOUNTER — OFFICE VISIT (OUTPATIENT)
Dept: ENDOCRINOLOGY | Age: 50
End: 2025-03-14
Payer: COMMERCIAL

## 2025-03-14 VITALS
BODY MASS INDEX: 35.39 KG/M2 | HEART RATE: 100 BPM | WEIGHT: 220.2 LBS | SYSTOLIC BLOOD PRESSURE: 134 MMHG | OXYGEN SATURATION: 98 % | TEMPERATURE: 98.5 F | HEIGHT: 66 IN | DIASTOLIC BLOOD PRESSURE: 86 MMHG

## 2025-03-14 DIAGNOSIS — E11.65 TYPE 2 DIABETES MELLITUS WITH HYPERGLYCEMIA, WITHOUT LONG-TERM CURRENT USE OF INSULIN: Primary | ICD-10-CM

## 2025-03-14 DIAGNOSIS — E66.01 CLASS 2 SEVERE OBESITY DUE TO EXCESS CALORIES WITH SERIOUS COMORBIDITY AND BODY MASS INDEX (BMI) OF 35.0 TO 35.9 IN ADULT: ICD-10-CM

## 2025-03-14 DIAGNOSIS — E78.2 MIXED HYPERLIPIDEMIA: ICD-10-CM

## 2025-03-14 DIAGNOSIS — E66.812 CLASS 2 SEVERE OBESITY DUE TO EXCESS CALORIES WITH SERIOUS COMORBIDITY AND BODY MASS INDEX (BMI) OF 35.0 TO 35.9 IN ADULT: ICD-10-CM

## 2025-03-14 LAB
BUN SERPL-MCNC: 17 MG/DL (ref 6–20)
BUN/CREAT SERPL: 13.3 (ref 7–25)
CALCIUM SERPL-MCNC: 9.7 MG/DL (ref 8.6–10.5)
CHLORIDE SERPL-SCNC: 102 MMOL/L (ref 98–107)
CO2 SERPL-SCNC: 24 MMOL/L (ref 22–29)
CREAT SERPL-MCNC: 1.28 MG/DL (ref 0.76–1.27)
EGFRCR SERPLBLD CKD-EPI 2021: 68.2 ML/MIN/1.73
GLUCOSE SERPL-MCNC: 122 MG/DL (ref 65–99)
HBA1C MFR BLD: 6 % (ref 4.8–5.6)
POTASSIUM SERPL-SCNC: 4.9 MMOL/L (ref 3.5–5.2)
SODIUM SERPL-SCNC: 138 MMOL/L (ref 136–145)

## 2025-03-14 PROCEDURE — 99214 OFFICE O/P EST MOD 30 MIN: CPT | Performed by: NURSE PRACTITIONER

## 2025-03-14 RX ORDER — TIRZEPATIDE 10 MG/.5ML
10 INJECTION, SOLUTION SUBCUTANEOUS WEEKLY
Qty: 6 ML | Refills: 1 | Status: SHIPPED | OUTPATIENT
Start: 2025-03-14

## 2025-03-14 RX ORDER — DAPAGLIFLOZIN 10 MG/1
1 TABLET, FILM COATED ORAL DAILY
Qty: 90 TABLET | Refills: 1 | Status: SHIPPED | OUTPATIENT
Start: 2025-03-14

## 2025-03-14 NOTE — PROGRESS NOTES
"Chief Complaint  Diabetes    Subjective        Jamie Suárez presents to [facility] ENDOCRINOLOGY  History of Present Illness    Mom  3 weeks ago, he just got back in town yesterday   Before this, he was doing really well eating healthy, walking and taking medication consistent  Despite the grieving process he is going through, he still plans to take care of his health with his eating habits and activity levels     Type 2 dm, < 5 years    DM regimen: Mounjaro 10 mg weekly, metformin at 1000 mg once daily, farxiga 10mg daily   diabetic eye exam: 2024   CV: rosuvastatin 10mg QD  Renal: losartan 100mg QD, saw renal r/t urine protein- no findings, thinks it was surgery related   Weight is down 15-20 pounds since 2024  Goal weight 180lbs    Objective   Vital Signs:  /86   Pulse 100   Temp 98.5 °F (36.9 °C) (Oral)   Ht 167.6 cm (65.98\")   Wt 99.9 kg (220 lb 3.2 oz)   SpO2 98%   BMI 35.56 kg/m²   Estimated body mass index is 35.56 kg/m² as calculated from the following:    Height as of this encounter: 167.6 cm (65.98\").    Weight as of this encounter: 99.9 kg (220 lb 3.2 oz).          Physical Exam  Vitals reviewed.   Constitutional:       General: He is not in acute distress.  HENT:      Head: Normocephalic and atraumatic.   Cardiovascular:      Rate and Rhythm: Normal rate.   Pulmonary:      Effort: Pulmonary effort is normal. No respiratory distress.   Musculoskeletal:         General: No signs of injury. Normal range of motion.      Cervical back: Normal range of motion and neck supple.   Skin:     General: Skin is warm and dry.   Neurological:      Mental Status: He is alert and oriented to person, place, and time. Mental status is at baseline.   Psychiatric:         Mood and Affect: Mood normal.         Behavior: Behavior normal.         Thought Content: Thought content normal.         Judgment: Judgment normal.        Result Review :  The following data was reviewed by: " KAYLA Cifuentes on 03/14/2025:  Common labs          7/28/2024    22:54 9/13/2024    11:14   Common Labs   Glucose 159  132    BUN 17  15    Creatinine 1.17  1.30    Sodium 138  142    Potassium 4.2  4.4    Chloride 107  105    Calcium 9.1  9.0    Albumin 4.1  3.9    Total Bilirubin 0.2  0.5    Alkaline Phosphatase 65  59    AST (SGOT) 29  29    ALT (SGPT) 40  39    WBC 11.72     Hemoglobin 19.0     Hematocrit 57.3     Platelets 157     Total Cholesterol  113    Triglycerides  96    HDL Cholesterol  41    LDL Cholesterol   54    Hemoglobin A1C  6.60    Microalbumin, Urine  1,415.8                Assessment and Plan   Diagnoses and all orders for this visit:    1. Type 2 diabetes mellitus with hyperglycemia, without long-term current use of insulin (Primary)  -     Tirzepatide (Mounjaro) 10 MG/0.5ML solution auto-injector; Inject 0.5 mL under the skin into the appropriate area as directed 1 (One) Time Per Week.  Dispense: 6 mL; Refill: 1  -     dapagliflozin Propanediol 10 MG tablet; Take 10 mg by mouth Daily.  Dispense: 90 tablet; Refill: 1  -     Hemoglobin A1c  -     Basic Metabolic Panel    2. Mixed hyperlipidemia    3. Class 2 severe obesity due to excess calories with serious comorbidity and body mass index (BMI) of 35.0 to 35.9 in adult             Follow Up   No follow-ups on file.    Labs today  Tolerating above regimen without side effects or concerns  Weight loss goal progressing  Additional recommendations to follow as needed based on results      Patient was given instructions and counseling regarding his condition or for health maintenance advice. Please see specific information pulled into the AVS if appropriate.     KAYLA Cifuentes

## 2025-03-18 RX ORDER — IBUPROFEN 800 MG/1
800 TABLET, FILM COATED ORAL EVERY 6 HOURS PRN
COMMUNITY

## 2025-03-19 NOTE — PROGRESS NOTES
"Maria Del Rosario Suárez is a 49 y.o. male.     CC: URI-Sx    History of Present Illness     Patient comes in today reporting a 3+ week history of maxillary sinus pressure and discomfort.  Was out of town and went to an urgent care where they gave him a steroid shot, which helped briefly, but the symptoms remain.    On a side note, patient's mother passed away unexpectedly several weeks ago, and he is having great difficulty sleeping.      The following portions of the patient's history were reviewed and updated as appropriate: allergies, current medications, past family history, past medical history, past social history, past surgical history, and problem list.    Review of Systems   Constitutional:  Negative for activity change, chills and fever.   HENT:  Positive for sinus pressure.    Respiratory:  Negative for cough.    Cardiovascular:  Negative for chest pain.   Psychiatric/Behavioral:  Positive for sleep disturbance. Negative for dysphoric mood.        /88   Pulse 83   Temp 98.5 °F (36.9 °C) (Oral)   Resp 16   Ht 167.6 cm (65.98\")   Wt 102 kg (224 lb 6.4 oz)   SpO2 96%   BMI 36.24 kg/m²     Objective   Physical Exam  Vitals and nursing note reviewed.   Constitutional:       General: He is not in acute distress.     Appearance: He is well-developed.   HENT:      Right Ear: Tympanic membrane and ear canal normal.      Left Ear: Tympanic membrane and ear canal normal.   Cardiovascular:      Rate and Rhythm: Normal rate and regular rhythm.   Pulmonary:      Effort: Pulmonary effort is normal.      Breath sounds: Normal breath sounds.   Neurological:      Mental Status: He is alert and oriented to person, place, and time.   Psychiatric:         Behavior: Behavior normal.         Thought Content: Thought content normal.         Assessment & Plan   Diagnoses and all orders for this visit:    1. Acute non-recurrent maxillary sinusitis (Primary)  -     cefdinir (OMNICEF) 300 MG capsule; Take 1 capsule " by mouth 2 (Two) Times a Day for 10 days.  Dispense: 20 capsule; Refill: 0    2. Adjustment insomnia  -     traZODone (DESYREL) 50 MG tablet; Take 0.5-3 tablets by mouth Every Night.  Dispense: 90 tablet; Refill: 1    Nasacort discussed.

## 2025-03-20 ENCOUNTER — OFFICE VISIT (OUTPATIENT)
Dept: FAMILY MEDICINE CLINIC | Facility: CLINIC | Age: 50
End: 2025-03-20
Payer: COMMERCIAL

## 2025-03-20 VITALS
SYSTOLIC BLOOD PRESSURE: 132 MMHG | RESPIRATION RATE: 16 BRPM | HEART RATE: 83 BPM | DIASTOLIC BLOOD PRESSURE: 88 MMHG | HEIGHT: 66 IN | OXYGEN SATURATION: 96 % | WEIGHT: 224.4 LBS | BODY MASS INDEX: 36.07 KG/M2 | TEMPERATURE: 98.5 F

## 2025-03-20 DIAGNOSIS — F51.02 ADJUSTMENT INSOMNIA: ICD-10-CM

## 2025-03-20 DIAGNOSIS — J01.00 ACUTE NON-RECURRENT MAXILLARY SINUSITIS: Primary | ICD-10-CM

## 2025-03-20 PROCEDURE — 99214 OFFICE O/P EST MOD 30 MIN: CPT | Performed by: FAMILY MEDICINE

## 2025-03-20 RX ORDER — TRAZODONE HYDROCHLORIDE 50 MG/1
25-150 TABLET ORAL NIGHTLY
Qty: 90 TABLET | Refills: 1 | Status: SHIPPED | OUTPATIENT
Start: 2025-03-20

## 2025-03-20 RX ORDER — CEFDINIR 300 MG/1
300 CAPSULE ORAL 2 TIMES DAILY
Qty: 20 CAPSULE | Refills: 0 | Status: SHIPPED | OUTPATIENT
Start: 2025-03-20 | End: 2025-03-30

## 2025-03-20 RX ORDER — LORAZEPAM 1 MG/1
1 TABLET ORAL EVERY 6 HOURS PRN
COMMUNITY
Start: 2025-03-02

## 2025-04-01 DIAGNOSIS — I10 PRIMARY HYPERTENSION: Chronic | ICD-10-CM

## 2025-04-01 RX ORDER — LOSARTAN POTASSIUM 100 MG/1
100 TABLET ORAL DAILY
Qty: 90 TABLET | Refills: 3 | OUTPATIENT
Start: 2025-04-01

## 2025-04-01 RX ORDER — LOSARTAN POTASSIUM 100 MG/1
100 TABLET ORAL DAILY
Qty: 90 TABLET | Refills: 3 | Status: SHIPPED | OUTPATIENT
Start: 2025-04-01

## 2025-04-01 NOTE — TELEPHONE ENCOUNTER
Caller: Jamie Suárez    Relationship: Self    Best call back number: [phone]      Requested Prescriptions:   Requested Prescriptions     Pending Prescriptions Disp Refills    losartan (COZAAR) 100 MG tablet 90 tablet 3     Sig: Take 1 tablet by mouth Daily.        Pharmacy where request should be sent: [id number] [address] AT [facility] - [phone] [facility] [phone]      Last office visit with prescribing clinician: 3/20/2025   Last telemedicine visit with prescribing clinician: Visit date not found   Next office visit with prescribing clinician: Visit date not found     Additional details provided by patient: [facility] SAID HE DID NOT HAVE ANY REFILLS.  HE ONLY HAS 3 PILLS LEFT.  PLEASE REFILL    Does the patient have less than a 3 day supply:  [x] Yes  [] No    Would you like a call back once the refill request has been completed: [] Yes [x] No    If the office needs to give you a call back, can they leave a voicemail: [] Yes [x] No    Evaristo Krishnamurthy Rep   04/01/25 13:11 EDT

## 2025-04-02 ENCOUNTER — SPECIALTY PHARMACY (OUTPATIENT)
Dept: ENDOCRINOLOGY | Age: 50
End: 2025-04-02
Payer: COMMERCIAL

## 2025-04-02 NOTE — PROGRESS NOTES
Specialty Pharmacy Patient Management Program  Refill Outreach     Do not see Cozaar/Losartan on patient's profile, removing outreach.     Carrie Davila, Pharmacy Technician  4/2/2025  13:57 EDT

## 2025-04-07 NOTE — PROGRESS NOTES
Maria Del Rosario Suárez is a 49 y.o. male.     CC: ED f/u for Asthma    History of Present Illness     Patient returns today after being seen in a [place] emergency department on 4/5/2025 with this note:    HPI  Context: Patient is a 49-year-old male history of intermittent asthma, bronchitis, diabetes, GERD who presents with shortness of breath. Has a history of intermittent asthma. Developed shortness of breath about 15 minutes prior to arrival. Still minimally short of breath but symptoms have improved. Denies any chest pain, palpitations. No fever, chills, cough or congestion. No lightheadedness or dizziness. No abdominal pain. Does not smoke, vape. Reports a history of intermittent asthma. Does not have an inhaler    ED COURSE:    MDM: Patient is shortness of breath. Started about 20 minutes or denies prior arrival. Originally mildly hypoxic at 94% SaO2 on room air. Normal on my exam. Does have wheezing consistent with asthmatic exacerbation. No other complaints. Given patient's age will check cardiac status with CBC chemistry EKG troponin and chest x-ray.    Reevaluation 0810: Patient's labs show increased hemoglobin and creatinine. BUN normal. Does not appear to be dehydrated. Does not give history of dehydration. Has had elevated hemoglobin and creatinine the past. Patient reports he had been evaluated by nephrology but does not report specific details. Given patient shortness of breath. This is consistent with asthmatic exacerbation. Discharged home with short course steroids and albuterol. Patient instructed to follow-up with his doctor in 1 to 2 weeks for reevaluation of the labs    ICD-10-CM ICD-9-CM  1. Mild intermittent asthma, unspecified whether complicated J45.20 493.90  2. Polycythemia D75.1 238.4  3. Elevated serum creatinine R79.89 790.99    Signed Prescriptions Disp Refills  albuterol  (90 Base) MCG/ACT inhaler 1 each 1  Sig: Inhale 1-2 puffs into the lungs every 4 (four) hours as  "needed for Wheezing or Shortness of Air.  predniSONE (DELTASONE) 10 MG tablet 12 tablet 0  Sig: Take 2 tablets by mouth 2 (two) times daily for 3 days.    Current outpatient and discharge medications have been reconciled for the patient.  Reviewed by: Harrison Broderick MD    Pt is overall feeling better at this time.          The following portions of the patient's history were reviewed and updated as appropriate: allergies, current medications, past family history, past medical history, past social history, past surgical history, and problem list.    Review of Systems   Constitutional:  Negative for activity change, chills and fever.   Respiratory:  Negative for cough.    Cardiovascular:  Negative for chest pain.   Psychiatric/Behavioral:  Negative for dysphoric mood.        /82   Pulse 94   Temp 98.5 °F (36.9 °C) (Oral)   Resp 18   Ht 167.6 cm (65.98\")   Wt 102 kg (224 lb 9.6 oz)   SpO2 100%   BMI 36.27 kg/m²     Objective   Physical Exam  Vitals and nursing note reviewed.   Constitutional:       General: He is not in acute distress.     Appearance: He is well-developed.   Cardiovascular:      Rate and Rhythm: Normal rate and regular rhythm.   Pulmonary:      Effort: Pulmonary effort is normal.      Breath sounds: Normal breath sounds.   Neurological:      Mental Status: He is alert and oriented to person, place, and time.   Psychiatric:         Behavior: Behavior normal.         Thought Content: Thought content normal.     Hospital records reviewed with pt confirming HPI.      Assessment & Plan   Diagnoses and all orders for this visit:    1. Moderate persistent asthmatic bronchitis with acute exacerbation (Primary)    2. Hospital discharge follow-up    3. SANA (obstructive sleep apnea)  -     Ambulatory Referral to Sleep Medicine    Pt's CPAP has been broken for some time now and, coupled with his chronic erythrocytosis, will get pt back to Sleep Medicine.             "

## 2025-04-08 ENCOUNTER — SPECIALTY PHARMACY (OUTPATIENT)
Dept: ENDOCRINOLOGY | Age: 50
End: 2025-04-08
Payer: COMMERCIAL

## 2025-04-08 ENCOUNTER — OFFICE VISIT (OUTPATIENT)
Dept: FAMILY MEDICINE CLINIC | Facility: CLINIC | Age: 50
End: 2025-04-08
Payer: COMMERCIAL

## 2025-04-08 VITALS
WEIGHT: 224.6 LBS | TEMPERATURE: 98.5 F | OXYGEN SATURATION: 100 % | DIASTOLIC BLOOD PRESSURE: 82 MMHG | HEART RATE: 94 BPM | HEIGHT: 66 IN | BODY MASS INDEX: 36.1 KG/M2 | SYSTOLIC BLOOD PRESSURE: 128 MMHG | RESPIRATION RATE: 18 BRPM

## 2025-04-08 DIAGNOSIS — G47.33 OSA (OBSTRUCTIVE SLEEP APNEA): ICD-10-CM

## 2025-04-08 DIAGNOSIS — J45.41 MODERATE PERSISTENT ASTHMATIC BRONCHITIS WITH ACUTE EXACERBATION: Primary | ICD-10-CM

## 2025-04-08 DIAGNOSIS — Z09 HOSPITAL DISCHARGE FOLLOW-UP: ICD-10-CM

## 2025-04-08 RX ORDER — BROMPHENIRAMINE MALEATE, PSEUDOEPHEDRINE HYDROCHLORIDE, AND DEXTROMETHORPHAN HYDROBROMIDE 2; 30; 10 MG/5ML; MG/5ML; MG/5ML
SYRUP ORAL
COMMUNITY

## 2025-04-08 RX ORDER — CETIRIZINE HYDROCHLORIDE 5 MG/1
5 TABLET ORAL DAILY
COMMUNITY

## 2025-04-08 RX ORDER — PREDNISONE 10 MG/1
20 TABLET ORAL
COMMUNITY
Start: 2025-04-05 | End: 2025-04-08

## 2025-04-08 NOTE — PROGRESS NOTES
Specialty Pharmacy Patient Management Program  One-Time Clinical Outreach     Jamie Suárez is seen by an their provider for diabetes mellitus and enrolled in the Endocrine Disorders Patient Management program offered by [facility]    Jamie had an ER visit on 4/5/25. In the ER, he was given a steroid injection and given oral 3 day supply of prednisone. The visit was documented as an asthma exacerbation. Patient reported that his daughter was sick and he was exposed. He did say he felt a little funny. He is on prednisone and albuterol inhaler PRN. We discussed that the steroid could increase his blood sugar and that it could increase appetite. He tries to eat healthy and he walks outside daily. His A1C was 6% at his last visit in March 2025. His next visit is 9/18/2025.     I told him to give us a call if he needed anything or had any other concerns. His next follow up is scheduled for 5/5/25 for refill coordination. We discussed the importance of taking the metformin, Mounjaro and Farxiga as directed especially with the recent steroid.     Carey Pak, PharmD, [facility]  Specialty Clinical Pharmacist  04/08/25  15:52 EDT

## 2025-04-08 NOTE — PROGRESS NOTES
Specialty Pharmacy Patient Management Program  Refill Outreach     Jamie was contacted today regarding refills of their medication(s).    Refill Questions      Flowsheet Row Most Recent Value   Changes to allergies? No   Changes to medications? No   New conditions or infections since last clinic visit No   Unplanned office visit, urgent care, ED, or hospital admission in the last 4 weeks  Yes  [ER visit: Had breathing difficulty, daughter was sick thinks he may have caught what she had, instructed by ER to f/u w PCP, patient doing that now, was in waiting room wealec we spoke at 9:32am.]   How does patient/caregiver feel medication is working? Good   Financial problems or insurance changes  No   Since the previous refill, were any specialty medication doses or scheduled injections missed or delayed?  No   Does this patient require a clinical escalation to a pharmacist? Yes  [ER visit: Had breathing difficulty, daughter was sick thinks he may have caught what she had, instructed by ER to f/u w PCP, patient doing that now, was in waiting room wealec we spoke at 9:32am.]            Delivery Questions      Flowsheet Row Most Recent Value   Delivery method  at Pharmacy   Number of medications in delivery 1   Medication(s) being filled and delivered Tirzepatide (Mounjaro)   Doses left of specialty medications 1 week   Copay verified? Yes   Copay amount $12.00   Copay form of payment Pay at pickup   Signature Required No   Do you consent to receive electronic handouts?  No                 Follow-up: 21 day(s)     Carrie Davila, Pharmacy Technician  4/8/2025  09:33 EDT

## 2025-04-09 ENCOUNTER — OFFICE VISIT (OUTPATIENT)
Dept: SLEEP MEDICINE | Facility: HOSPITAL | Age: 50
End: 2025-04-09
Payer: COMMERCIAL

## 2025-04-09 VITALS — HEIGHT: 66 IN | BODY MASS INDEX: 36 KG/M2 | HEART RATE: 89 BPM | WEIGHT: 224 LBS | OXYGEN SATURATION: 96 %

## 2025-04-09 DIAGNOSIS — G47.10 HYPERSOMNIA: ICD-10-CM

## 2025-04-09 DIAGNOSIS — G47.33 OSA (OBSTRUCTIVE SLEEP APNEA): Primary | ICD-10-CM

## 2025-04-09 DIAGNOSIS — D75.1 POLYCYTHEMIA: ICD-10-CM

## 2025-04-09 DIAGNOSIS — R06.83 SNORING: ICD-10-CM

## 2025-04-09 DIAGNOSIS — G47.8 NON-RESTORATIVE SLEEP: ICD-10-CM

## 2025-04-09 DIAGNOSIS — E66.01 MORBID OBESITY: ICD-10-CM

## 2025-04-09 DIAGNOSIS — T88.8XXA MALFUNCTION OF CONTINUOUS POSITIVE AIRWAY PRESSURE (CPAP) OR BILEVEL POSITIVE AIRWAY PRESSURE (BPAP) MACHINE, INITIAL ENCOUNTER: ICD-10-CM

## 2025-04-09 PROCEDURE — G0463 HOSPITAL OUTPT CLINIC VISIT: HCPCS

## 2025-04-22 ENCOUNTER — HOSPITAL ENCOUNTER (OUTPATIENT)
Dept: SLEEP MEDICINE | Facility: HOSPITAL | Age: 50
Discharge: HOME | End: 2025-04-22
Admitting: FAMILY MEDICINE
Payer: COMMERCIAL

## 2025-04-22 DIAGNOSIS — E66.01 MORBID OBESITY: ICD-10-CM

## 2025-04-22 DIAGNOSIS — R06.83 SNORING: ICD-10-CM

## 2025-04-22 DIAGNOSIS — G47.8 NON-RESTORATIVE SLEEP: ICD-10-CM

## 2025-04-22 DIAGNOSIS — G47.33 OSA (OBSTRUCTIVE SLEEP APNEA): ICD-10-CM

## 2025-04-22 DIAGNOSIS — D75.1 POLYCYTHEMIA: ICD-10-CM

## 2025-04-22 DIAGNOSIS — T88.8XXA MALFUNCTION OF CONTINUOUS POSITIVE AIRWAY PRESSURE (CPAP) OR BILEVEL POSITIVE AIRWAY PRESSURE (BPAP) MACHINE, INITIAL ENCOUNTER: ICD-10-CM

## 2025-04-22 DIAGNOSIS — G47.10 HYPERSOMNIA: ICD-10-CM

## 2025-04-22 PROCEDURE — G0399 HOME SLEEP TEST/TYPE 3 PORTA: HCPCS

## 2025-04-24 ENCOUNTER — TELEPHONE (OUTPATIENT)
Dept: FAMILY MEDICINE CLINIC | Facility: CLINIC | Age: 50
End: 2025-04-24
Payer: COMMERCIAL

## 2025-04-24 NOTE — TELEPHONE ENCOUNTER
Patient called into our office stating he has been SOB with and without exertion denies chest patient for about 2 weeks. Patient went to the UC 4/19 and was out on antibiotic and he still doesn't feel well. Advised patient to go to the ER or UC to be seen. Patient stated that he didn't want to really go.Patient was informed that his provider doesn't have any day appt. available for today informed him that I could schedule him with different provider at 4 pm patient stated that he would just go to the ER to be seen

## 2025-04-25 ENCOUNTER — APPOINTMENT (OUTPATIENT)
Dept: GENERAL RADIOLOGY | Facility: HOSPITAL | Age: 50
End: 2025-04-25
Payer: COMMERCIAL

## 2025-04-25 ENCOUNTER — HOSPITAL ENCOUNTER (EMERGENCY)
Facility: HOSPITAL | Age: 50
Discharge: HOME | End: 2025-04-25
Attending: EMERGENCY MEDICINE
Payer: COMMERCIAL

## 2025-04-25 VITALS
BODY MASS INDEX: 35.36 KG/M2 | SYSTOLIC BLOOD PRESSURE: 141 MMHG | HEIGHT: 66 IN | RESPIRATION RATE: 18 BRPM | TEMPERATURE: 98 F | OXYGEN SATURATION: 98 % | HEART RATE: 99 BPM | WEIGHT: 220 LBS | DIASTOLIC BLOOD PRESSURE: 88 MMHG

## 2025-04-25 DIAGNOSIS — T88.8XXA MALFUNCTION OF CONTINUOUS POSITIVE AIRWAY PRESSURE (CPAP) OR BILEVEL POSITIVE AIRWAY PRESSURE (BPAP) MACHINE, INITIAL ENCOUNTER: ICD-10-CM

## 2025-04-25 DIAGNOSIS — R06.83 SNORING: ICD-10-CM

## 2025-04-25 DIAGNOSIS — R06.00 DYSPNEA, UNSPECIFIED TYPE: Primary | ICD-10-CM

## 2025-04-25 DIAGNOSIS — D75.1 POLYCYTHEMIA: ICD-10-CM

## 2025-04-25 DIAGNOSIS — E66.01 MORBID OBESITY: ICD-10-CM

## 2025-04-25 DIAGNOSIS — G47.8 NON-RESTORATIVE SLEEP: ICD-10-CM

## 2025-04-25 DIAGNOSIS — G47.10 HYPERSOMNIA: ICD-10-CM

## 2025-04-25 DIAGNOSIS — G47.33 OBSTRUCTIVE SLEEP APNEA: Primary | ICD-10-CM

## 2025-04-25 LAB
ALBUMIN SERPL-MCNC: 4.2 G/DL (ref 3.5–5.2)
ALBUMIN/GLOB SERPL: 1.4 G/DL
ALP SERPL-CCNC: 56 U/L (ref 39–117)
ALT SERPL W P-5'-P-CCNC: 26 U/L (ref 1–41)
ANION GAP SERPL CALCULATED.3IONS-SCNC: 10.3 MMOL/L (ref 5–15)
AST SERPL-CCNC: 24 U/L (ref 1–40)
BASOPHILS # BLD AUTO: 0.04 10*3/MM3 (ref 0–0.2)
BASOPHILS NFR BLD AUTO: 0.3 % (ref 0–1.5)
BILIRUB SERPL-MCNC: 0.6 MG/DL (ref 0–1.2)
BUN SERPL-MCNC: 20 MG/DL (ref 6–20)
BUN/CREAT SERPL: 14.4 (ref 7–25)
CALCIUM SPEC-SCNC: 8.9 MG/DL (ref 8.6–10.5)
CHLORIDE SERPL-SCNC: 107 MMOL/L (ref 98–107)
CO2 SERPL-SCNC: 19.7 MMOL/L (ref 22–29)
CREAT SERPL-MCNC: 1.39 MG/DL (ref 0.76–1.27)
D DIMER PPP FEU-MCNC: 0.26 MCGFEU/ML (ref 0–0.5)
DEPRECATED RDW RBC AUTO: 46.9 FL (ref 37–54)
EGFRCR SERPLBLD CKD-EPI 2021: 61.8 ML/MIN/1.73
EOSINOPHIL # BLD AUTO: 0.18 10*3/MM3 (ref 0–0.4)
EOSINOPHIL NFR BLD AUTO: 1.5 % (ref 0.3–6.2)
ERYTHROCYTE [DISTWIDTH] IN BLOOD BY AUTOMATED COUNT: 14.3 % (ref 12.3–15.4)
GEN 5 1HR TROPONIN T REFLEX: <6 NG/L
GLOBULIN UR ELPH-MCNC: 3 GM/DL
GLUCOSE SERPL-MCNC: 109 MG/DL (ref 65–99)
HCT VFR BLD AUTO: 57.9 % (ref 37.5–51)
HGB BLD-MCNC: 19.6 G/DL (ref 13–17.7)
HOLD SPECIMEN: NORMAL
IMM GRANULOCYTES # BLD AUTO: 0.06 10*3/MM3 (ref 0–0.05)
IMM GRANULOCYTES NFR BLD AUTO: 0.5 % (ref 0–0.5)
LYMPHOCYTES # BLD AUTO: 3.32 10*3/MM3 (ref 0.7–3.1)
LYMPHOCYTES NFR BLD AUTO: 27.8 % (ref 19.6–45.3)
MCH RBC QN AUTO: 30.7 PG (ref 26.6–33)
MCHC RBC AUTO-ENTMCNC: 33.9 G/DL (ref 31.5–35.7)
MCV RBC AUTO: 90.8 FL (ref 79–97)
MONOCYTES # BLD AUTO: 0.88 10*3/MM3 (ref 0.1–0.9)
MONOCYTES NFR BLD AUTO: 7.4 % (ref 5–12)
NEUTROPHILS NFR BLD AUTO: 62.5 % (ref 42.7–76)
NEUTROPHILS NFR BLD AUTO: 7.47 10*3/MM3 (ref 1.7–7)
PLATELET # BLD AUTO: 176 10*3/MM3 (ref 140–450)
PMV BLD AUTO: 11.2 FL (ref 6–12)
POTASSIUM SERPL-SCNC: 4 MMOL/L (ref 3.5–5.2)
PROT SERPL-MCNC: 7.2 G/DL (ref 6–8.5)
QT INTERVAL: 382 MS
QTC INTERVAL: 401 MS
RBC # BLD AUTO: 6.38 10*6/MM3 (ref 4.14–5.8)
SODIUM SERPL-SCNC: 137 MMOL/L (ref 136–145)
TROPONIN T NUMERIC DELTA: NORMAL
TROPONIN T SERPL HS-MCNC: <6 NG/L
WBC NRBC COR # BLD AUTO: 11.95 10*3/MM3 (ref 3.4–10.8)
WHOLE BLOOD HOLD COAG: NORMAL
WHOLE BLOOD HOLD SPECIMEN: NORMAL

## 2025-04-25 PROCEDURE — 93010 ELECTROCARDIOGRAM REPORT: CPT | Performed by: STUDENT IN AN ORGANIZED HEALTH CARE EDUCATION/TRAINING PROGRAM

## 2025-04-25 PROCEDURE — 99284 EMERGENCY DEPT VISIT MOD MDM: CPT

## 2025-04-25 PROCEDURE — 94640 AIRWAY INHALATION TREATMENT: CPT

## 2025-04-25 PROCEDURE — 84484 ASSAY OF TROPONIN QUANT: CPT | Performed by: EMERGENCY MEDICINE

## 2025-04-25 PROCEDURE — 71046 X-RAY EXAM CHEST 2 VIEWS: CPT

## 2025-04-25 PROCEDURE — 80053 COMPREHEN METABOLIC PANEL: CPT | Performed by: EMERGENCY MEDICINE

## 2025-04-25 PROCEDURE — 85025 COMPLETE CBC W/AUTO DIFF WBC: CPT

## 2025-04-25 PROCEDURE — 36415 COLL VENOUS BLD VENIPUNCTURE: CPT

## 2025-04-25 PROCEDURE — 93005 ELECTROCARDIOGRAM TRACING: CPT | Performed by: EMERGENCY MEDICINE

## 2025-04-25 PROCEDURE — 85379 FIBRIN DEGRADATION QUANT: CPT

## 2025-04-25 RX ORDER — INHALER, ASSIST DEVICES
SPACER (EA) MISCELLANEOUS
Qty: 1 EACH | Refills: 0 | Status: SHIPPED | OUTPATIENT
Start: 2025-04-25 | End: 2026-04-25

## 2025-04-25 RX ORDER — CETIRIZINE HYDROCHLORIDE 10 MG/1
10 TABLET ORAL NIGHTLY
Qty: 30 TABLET | Refills: 0 | Status: SHIPPED | OUTPATIENT
Start: 2025-04-25 | End: 2025-05-25

## 2025-04-25 RX ORDER — METHYLPREDNISOLONE 4 MG/1
TABLET ORAL
Qty: 21 TABLET | Refills: 0 | Status: SHIPPED | OUTPATIENT
Start: 2025-04-25

## 2025-04-25 RX ORDER — IPRATROPIUM BROMIDE AND ALBUTEROL SULFATE 2.5; .5 MG/3ML; MG/3ML
3 SOLUTION RESPIRATORY (INHALATION) ONCE
Status: COMPLETED | OUTPATIENT
Start: 2025-04-25 | End: 2025-04-25

## 2025-04-25 RX ORDER — SODIUM CHLORIDE 0.9 % (FLUSH) 0.9 %
10 SYRINGE (ML) INJECTION AS NEEDED
Status: DISCONTINUED | OUTPATIENT
Start: 2025-04-25 | End: 2025-04-25 | Stop reason: HOSPADM

## 2025-04-25 RX ADMIN — IPRATROPIUM BROMIDE AND ALBUTEROL SULFATE 3 ML: .5; 3 SOLUTION RESPIRATORY (INHALATION) at 13:59

## 2025-04-25 NOTE — DISCHARGE INSTRUCTIONS
Start steroid pack today or tomorrow, take as prescribed until completion.  Continue use of albuterol inhaler as needed.  I have sent in a spacer prescription to the pharmacy.  Recommend start cetirizine 10 mg at night for allergy protection.  Continue follow-up with your primary care for ongoing evaluation and management.  Return to emergency department for worsening symptoms or other medical emergencies as discussed.  Refer to the attached instructions for further information.

## 2025-04-25 NOTE — Clinical Note
[facility]  [address]  [place] [address]    Jamie Suárez was seen and treated in our emergency department on 4/25/2025.  He may return to work on 04/28/2025.         Thank you for choosing [facility].    Kasi Snell MD

## 2025-04-25 NOTE — FSED PROVIDER NOTE
Subjective   History of Present Illness  Patient is a 49-year-old male with history of DM, HTN, HLD, GERD obesity who presents for shortness of breath and chest tightness.  Patient reports shortness of breath coming and going x 3 to 4 weeks.  Thinks it initially onset from a cold he got from his daughter.  Patient continues to have congestion.  Has been seen by ER, PCP, and sleep specialist.  Currently on Augmentin without any significant relief.  Flonase has not provided relief.  Shortness of breath more often occurs at night.  Not associated with activity.  Patient able to do strenuous work without any symptoms.  Prednisone has provided relief.  However, over the past couple of days he has had a new chest tightness.  Also coming and going.  Not associated with anything in particular including activity, movement, deep breaths, palpation.  Patient reports his symptoms feel similar to episodes he will have almost yearly in the fall which are typically resolved with nebulizers.        Review of Systems   Constitutional:  Negative for appetite change, chills, diaphoresis, fatigue and fever.   HENT:  Positive for congestion. Negative for sore throat, trouble swallowing and voice change.    Respiratory:  Positive for chest tightness and shortness of breath. Negative for wheezing and stridor.    Cardiovascular:  Negative for palpitations and leg swelling.   Gastrointestinal:  Negative for abdominal pain, diarrhea, nausea and vomiting.   Musculoskeletal:  Negative for back pain and myalgias.   Skin:  Negative for color change, pallor, rash and wound.   Neurological:  Negative for dizziness, syncope, weakness, light-headedness, numbness and headaches.       Past Medical History:   Diagnosis Date    Asthma     Bronchitis     COVID-19 03/2021    Diabetes mellitus     GERD (gastroesophageal reflux disease)     Reflux    Hiatal hernia     Nausea and vomiting 01/16/2019    Added automatically from request for surgery [id number]     Obesity     Sleep apnea     DOES NOT USE CPAP AT THIS TIME    Umbilical hernia        Allergies   Allergen Reactions    Morphine And Codeine Rash     He had a rash with morphine but no  Problem  with codiene       Past Surgical History:   Procedure Laterality Date    APPENDECTOMY      COLONOSCOPY W/ POLYPECTOMY N/A 04/20/2023    Procedure: COLONOSCOPY TO CECUM WITH COLD SNARE POLYPECTOMIES;  Surgeon: Lew Riddle MD;  Location: [facility] ENDOSCOPY;  Service: Gastroenterology;  Laterality: N/A;  pre: SCREENING  post: POLYPS    ENDOSCOPY N/A 01/21/2019    Procedure: ESOPHAGOGASTRODUODENOSCOPY with biopsy;  Surgeon: Lew Riddle MD;  Location: [facility] ENDOSCOPY;  Service: Gastroenterology    ENDOSCOPY N/A 04/20/2023    Procedure: ESOPHAGOGASTRODUODENOSCOPY WITH BIOPSIES;  Surgeon: Lew Riddle MD;  Location: [facility] ENDOSCOPY;  Service: Gastroenterology;  Laterality: N/A;  pre: HEARTBURN  post: HIATAL HERNIA    HERNIA REPAIR Bilateral     INGUINAL    TONSILLECTOMY      UMBILICAL HERNIA REPAIR N/A 2/27/2024    Procedure: UMBILICAL HERNIA REPAIR with mesh;  Surgeon: Aaron Correa MD;  Location: [facility] MAIN OR;  Service: General;  Laterality: N/A;       Family History   Problem Relation Age of Onset    Heart disease Mother     Hypertension Mother     Diabetes Father     Malig Hyperthermia Neg Hx        Social History     Socioeconomic History    Marital status: Single    Number of children: 5   Tobacco Use    Smoking status: Never    Smokeless tobacco: Never   Vaping Use    Vaping status: Never Used   Substance and Sexual Activity    Alcohol use: No    Drug use: No    Sexual activity: Defer           Objective   Physical Exam  Constitutional:       General: He is not in acute distress.     Appearance: He is obese. He is not ill-appearing, toxic-appearing or diaphoretic.   Cardiovascular:      Rate and Rhythm: Normal rate and regular rhythm.   Pulmonary:      Effort: Pulmonary effort is  normal. No respiratory distress.      Breath sounds: Normal breath sounds. No wheezing.   Abdominal:      Tenderness: There is no abdominal tenderness. There is no guarding.   Musculoskeletal:         General: Normal range of motion.      Right lower leg: No edema.      Left lower leg: No edema.   Skin:     General: Skin is warm and dry.   Neurological:      General: No focal deficit present.      Mental Status: He is alert.   Psychiatric:         Mood and Affect: Mood normal.         Behavior: Behavior normal.         Procedures           ED Course  ED Course as of 04/25/25 1715   Fri Apr 25, 2025   1348 EKG sinus rhythm without signs of acute ischemia. [AS]   1349 XR CHEST 2 VW-4/25/2025     HISTORY: Chest pain.     Heart size is within normal limits. Lungs appear free of acute  infiltrates. Bones and soft tissues are unremarkable.     IMPRESSION:  1. No acute process.   [AS]   1349 CBC with some hemoconcentration.  This has been consistent and previously evaluated by his PCP.  He is being evaluated for sleep apnea as well given high hemoglobin. [AS]   1349 Hemoglobin(!): 19.6 [AS]   1350 WBC(!): 11.95 [AS]   1350 RBC(!): 6.38 [AS]   1350 Hemoglobin(!): 19.6 [AS]   1350 Hematocrit(!): 57.9 [AS]   1350 Neutrophil Rel %: 62.5 [AS]   1350 Neutrophils Absolute(!): 7.47 [AS]   1350 Platelets: 176 [AS]   1350 Normal dimer. [AS]   1350 D-Dimer, Quant: 0.26 [AS]   1447 CMP with an elevated creatinine just above patient's typical baseline.  Decreased CO2.  Otherwise unremarkable. [AS]   1447 Glucose(!): 109 [AS]   1447 Creatinine(!): 1.39 [AS]   1447 CO2(!): 19.7  First troponin is normal. [AS]   1447 HS Troponin T: <6 [AS]   1510 Patient feels significant relief after nebulizer.  More likely pulmonary condition rather than cardiac given history and workup.   [AS]   1534 HS Troponin T: <6 [AS]   1534 Second troponin also normal.  ACS condition considered unlikely. [AS]   1542 Normal troponins and EKG placed the patient in  a low risk heart score category.  Overall, I do feel patient's presentation is more pulmonary.  He significantly improves after nebulizer.  We discussed this thoroughly, and patient is agreeable to discharge with close follow-up for ongoing evaluation. [AS]      ED Course User Index  [AS] Deena Schofield, JANNY                HEART Score: 3                            Medical Decision Making  Patient is a 49-year-old male who presents for shortness of breath ongoing several weeks, and chest tightness for several days.  Comes are coming and going.  Relatively asymptomatic at this time.  He appears well, no acute stress, nontoxic.  Vital signs WNL.  Patient is obese.  Exam is otherwise unremarkable.  Patient does have significant risk factors regarding his health history.  He has obesity, DM, HTN, positive family history.  However, he has normal EKG and troponins, giving him a low risk heart score.  He is also significantly improved with nebulizer, and reports improvement with prednisone in the past.  This seems to indicate pulmonary etiology rather than cardiac.  Patient is being followed by PCP and sleep specialist.  Recommend continued close follow-up.  Strict return precautions.    Symptomatic measures.  I am placing patient back on daily allergy medication, as I feel this is likely strongly related.  Also encouraged albuterol use.  Prescribed steroid pack.  Discussed when to return to the emergency department.  Answered all questions.  Patient verbalized understanding and was agreeable to plan and discharge.    My differential diagnosis for chest pain includes but is not limited to:  Muscle strain, costochondritis, myositis, pleurisy, rib fracture, intercostal neuritis, herpes zoster, tumor, myocardial infarction, coronary syndrome, unstable angina, angina, aortic dissection, mitral valve prolapse, pericarditis, palpitations, pulmonary embolus, pneumonia, pneumothorax, lung cancer, GERD, esophagitis, esophageal spasm      Problems Addressed:  Dyspnea, unspecified type: complicated acute illness or injury    Amount and/or Complexity of Data Reviewed  Labs: ordered. Decision-making details documented in ED Course.  Radiology: ordered.  ECG/medicine tests: ordered.    Risk  OTC drugs.  Prescription drug management.        Final diagnoses:   Dyspnea, unspecified type       ED Disposition  ED Disposition       ED Disposition   Discharge    Condition   Stable    Comment   --               Harrison Broderick MD  [address]  [place] [id number] [phone]               Medication List        New Prescriptions      AeroChamber MV inhaler  Use as instructed     methylPREDNISolone 4 MG dose pack  Commonly known as: MEDROL  6 tabs PO x1 day, 5 tabs PO x1 day, and continue decrease of 1 tablet/day.  6 days total treatment.            Changed      cetirizine 10 MG tablet  Commonly known as: zyrTEC  Take 1 tablet by mouth Every Night for 30 days.  What changed:   medication strength  how much to take  when to take this               Where to Get Your Medications        These medications were sent to [facility]/pharmacy #[id number] - [facility], [place] - [address] AT [facility] [phone] [facility] [phone]   [address], [facility] [address]      Phone: [phone]   AeroChamber MV inhaler  cetirizine 10 MG tablet  methylPREDNISolone 4 MG dose pack

## 2025-04-28 ENCOUNTER — TELEPHONE (OUTPATIENT)
Dept: FAMILY MEDICINE CLINIC | Facility: CLINIC | Age: 50
End: 2025-04-28
Payer: COMMERCIAL

## 2025-04-28 ENCOUNTER — TELEPHONE (OUTPATIENT)
Dept: SLEEP MEDICINE | Facility: HOSPITAL | Age: 50
End: 2025-04-28
Payer: COMMERCIAL

## 2025-04-28 NOTE — PROGRESS NOTES
Subjective   Jamie Suárez is a 49 y.o. male.     CC: ED f/u for Dyspnea    History of Present Illness     Patient returns today after being seen in the emergency department on 4/25/2025 with this note:    History of Present Illness  Patient is a 49-year-old male with history of DM, HTN, HLD, GERD obesity who presents for shortness of breath and chest tightness.  Patient reports shortness of breath coming and going x 3 to 4 weeks.  Thinks it initially onset from a cold he got from his daughter.  Patient continues to have congestion.  Has been seen by ER, PCP, and sleep specialist.  Currently on Augmentin without any significant relief.  Flonase has not provided relief.  Shortness of breath more often occurs at night.  Not associated with activity.  Patient able to do strenuous work without any symptoms.  Prednisone has provided relief.  However, over the past couple of days he has had a new chest tightness.  Also coming and going.  Not associated with anything in particular including activity, movement, deep breaths, palpation.  Patient reports his symptoms feel similar to episodes he will have almost yearly in the fall which are typically resolved with nebulizers.    Hemoglobin(!): 19.6    Creatinine(!): 1.39   CO2(!): 19.7    Normal troponins and EKG placed the patient in a low risk heart score category.  Overall, I do feel patient's presentation is more pulmonary.  He significantly improves after nebulizer.  We discussed this thoroughly, and patient is agreeable to discharge with close follow-up for ongoing evaluation.    Patient is being followed by PCP and sleep specialist.  Recommend continued close follow-up.  Strict return precautions.    Symptomatic measures.  I am placing patient back on daily allergy medication, as I feel this is likely strongly related.  Also encouraged albuterol use.  Prescribed steroid pack.  Discussed when to return to the emergency department.  Answered all questions.  Patient  "verbalized understanding and was agreeable to plan and discharge.    Final diagnoses:  Dyspnea, unspecified type    Current outpatient and discharge medications have been reconciled for the patient.  Reviewed by: Harrison Broderick MD    After these multiple trips to the ED, and numerous medications, pt remains winded.    Pt reports the inhaler seem to help more so yesterday.     Pt just had Sleep study and is being set up for a CPAP.    The following portions of the patient's history were reviewed and updated as appropriate: allergies, current medications, past family history, past medical history, past social history, past surgical history, and problem list.    Review of Systems   Constitutional:  Negative for activity change, chills and fever.   Respiratory:  Negative for cough.    Cardiovascular:  Negative for chest pain.   Psychiatric/Behavioral:  Negative for dysphoric mood.        /84   Pulse 88   Temp 98.3 °F (36.8 °C) (Oral)   Resp 16   Ht 167.6 cm (66\")   Wt 102 kg (224 lb)   SpO2 100%   BMI 36.15 kg/m²     Objective   Physical Exam  Vitals and nursing note reviewed.   Constitutional:       General: He is not in acute distress.     Appearance: He is well-developed.   Cardiovascular:      Rate and Rhythm: Normal rate and regular rhythm.   Pulmonary:      Effort: Pulmonary effort is normal.      Breath sounds: Normal breath sounds.   Neurological:      Mental Status: He is alert and oriented to person, place, and time.   Psychiatric:         Behavior: Behavior normal.         Thought Content: Thought content normal.     Hospital records reviewed with pt confirming HPI.      Assessment & Plan   Diagnoses and all orders for this visit:    1. Dyspnea, unspecified type (Primary)  -     Ambulatory Referral to Pulmonology    2. Erythrocytosis  Comments:  due to SANA; should self-correct with treatment.    3. SANA (obstructive sleep apnea)    4. Hospital discharge follow-up    Patient strongly encouraged on " use of his SANA treatment modalities and will get over to Pulmonary to r/o or r/I asthma.

## 2025-04-28 NOTE — TELEPHONE ENCOUNTER
Patient called into the office today C/O shortness of air. This has been going on for 3 weeks and states he's had every test preformed and doesn't know what to do. He went to the ER again on Friday 3/25/25 and was prescribed a medrol dose pack, xyrtec and Aero-Chamber Inhaler. None of the medicine seems to be working and he doesn't know what next steps should be. He wondering if a steroid inhaler would work, the ER told him he would need a dx of asthma for that.     I have scheduled him for an appt with Dr. Broderick on 4/29/25. If there is anything he needs to do in the meantime please advise.

## 2025-04-29 ENCOUNTER — OFFICE VISIT (OUTPATIENT)
Dept: FAMILY MEDICINE CLINIC | Facility: CLINIC | Age: 50
End: 2025-04-29
Payer: COMMERCIAL

## 2025-04-29 VITALS
RESPIRATION RATE: 16 BRPM | HEART RATE: 88 BPM | BODY MASS INDEX: 36 KG/M2 | OXYGEN SATURATION: 100 % | WEIGHT: 224 LBS | TEMPERATURE: 98.3 F | SYSTOLIC BLOOD PRESSURE: 128 MMHG | HEIGHT: 66 IN | DIASTOLIC BLOOD PRESSURE: 84 MMHG

## 2025-04-29 DIAGNOSIS — Z09 HOSPITAL DISCHARGE FOLLOW-UP: ICD-10-CM

## 2025-04-29 DIAGNOSIS — D75.1 ERYTHROCYTOSIS: ICD-10-CM

## 2025-04-29 DIAGNOSIS — G47.33 OSA (OBSTRUCTIVE SLEEP APNEA): ICD-10-CM

## 2025-04-29 DIAGNOSIS — R06.00 DYSPNEA, UNSPECIFIED TYPE: Primary | ICD-10-CM

## 2025-04-29 PROCEDURE — 99214 OFFICE O/P EST MOD 30 MIN: CPT | Performed by: FAMILY MEDICINE

## 2025-05-04 RX ORDER — OMEPRAZOLE 40 MG/1
40 CAPSULE, DELAYED RELEASE ORAL DAILY
Qty: 90 CAPSULE | Refills: 3 | Status: SHIPPED | OUTPATIENT
Start: 2025-05-04

## 2025-05-05 ENCOUNTER — SPECIALTY PHARMACY (OUTPATIENT)
Dept: ENDOCRINOLOGY | Age: 50
End: 2025-05-05
Payer: COMMERCIAL

## 2025-05-05 DIAGNOSIS — E11.65 TYPE 2 DIABETES MELLITUS WITH HYPERGLYCEMIA, WITHOUT LONG-TERM CURRENT USE OF INSULIN: ICD-10-CM

## 2025-05-05 NOTE — TELEPHONE ENCOUNTER
Rx Refill Note  Requested Prescriptions     Pending Prescriptions Disp Refills    metFORMIN (GLUCOPHAGE) 500 MG tablet [Pharmacy Med Name: METFORMIN 500MG TABLETS] 180 tablet 0     Sig: TAKE 2 TABLETS BY MOUTH DAILY WITH BREAKFAST      Last office visit with prescribing clinician: 3/14/2025   Last telemedicine visit with prescribing clinician: Visit date not found   Next office visit with prescribing clinician: 9/18/2025                         Would you like a call back once the refill request has been completed: [] Yes [] No    If the office needs to give you a call back, can they leave a voicemail: [] Yes [] No    Shyla Gutierrez MA  05/05/25, 11:59 EDT

## 2025-05-05 NOTE — PROGRESS NOTES
Specialty Pharmacy Patient Management Program  Refill Outreach     Jamie was contacted today regarding refills of their medication(s). Metformin will need to be moved to [facility] for further fills, removed the metformin outreach.    Refill Questions      Flowsheet Row Most Recent Value   Changes to allergies? No   Changes to medications? No   New conditions or infections since last clinic visit No   Unplanned office visit, urgent care, ED, or hospital admission in the last 4 weeks  No   How does patient/caregiver feel medication is working? Good   Financial problems or insurance changes  No   Since the previous refill, were any specialty medication doses or scheduled injections missed or delayed?  No   Does this patient require a clinical escalation to a pharmacist? No            Delivery Questions      Flowsheet Row Most Recent Value   Delivery method  at Pharmacy   Number of medications in delivery 1   Medication(s) being filled and delivered Tirzepatide (Mounjaro)   Doses left of specialty medications 1 week   Copay verified? Yes   Copay amount $12.00   Copay form of payment Pay at pickup   Signature Required No   Do you consent to receive electronic handouts?  No                 Follow-up: 21 day(s)     Carrie Davila, Pharmacy Technician  5/5/2025  09:28 EDT

## 2025-05-27 DIAGNOSIS — F51.02 ADJUSTMENT INSOMNIA: ICD-10-CM

## 2025-05-27 RX ORDER — TRAZODONE HYDROCHLORIDE 50 MG/1
TABLET ORAL
Qty: 90 TABLET | Refills: 1 | OUTPATIENT
Start: 2025-05-27

## 2025-06-03 ENCOUNTER — SPECIALTY PHARMACY (OUTPATIENT)
Dept: ENDOCRINOLOGY | Age: 50
End: 2025-06-03
Payer: COMMERCIAL

## 2025-06-03 NOTE — PROGRESS NOTES
Specialty Pharmacy Patient Management Program  Refill Outreach     Jamie was contacted today regarding refills of their medication(s).    Refill Questions      Flowsheet Row Most Recent Value   Changes to allergies? No   Changes to medications? No   New conditions or infections since last clinic visit No   Unplanned office visit, urgent care, ED, or hospital admission in the last 4 weeks  No   How does patient/caregiver feel medication is working? Good   Financial problems or insurance changes  No   Since the previous refill, were any specialty medication doses or scheduled injections missed or delayed?  No   Does this patient require a clinical escalation to a pharmacist? No            Delivery Questions      Flowsheet Row Most Recent Value   Delivery method  at Pharmacy   Number of medications in delivery 1   Medication(s) being filled and delivered Tirzepatide (Mounjaro)   Doses left of specialty medications 1 week   Copay verified? Yes   Copay amount $12.00   Copay form of payment Pay at pickup   Signature Required No   Do you consent to receive electronic handouts?  No                 Follow-up: 21 day(s)     Carrie Davila, Pharmacy Technician  6/3/2025  11:25 EDT

## 2025-06-27 ENCOUNTER — SPECIALTY PHARMACY (OUTPATIENT)
Dept: ENDOCRINOLOGY | Age: 50
End: 2025-06-27
Payer: COMMERCIAL

## 2025-06-27 ENCOUNTER — OFFICE VISIT (OUTPATIENT)
Dept: SLEEP MEDICINE | Facility: HOSPITAL | Age: 50
End: 2025-06-27
Payer: COMMERCIAL

## 2025-06-27 VITALS — HEIGHT: 66 IN | BODY MASS INDEX: 37.28 KG/M2 | WEIGHT: 232 LBS | HEART RATE: 91 BPM | OXYGEN SATURATION: 96 %

## 2025-06-27 DIAGNOSIS — E66.01 MORBID OBESITY: ICD-10-CM

## 2025-06-27 DIAGNOSIS — G47.33 OBSTRUCTIVE SLEEP APNEA: Primary | ICD-10-CM

## 2025-06-27 PROCEDURE — G0463 HOSPITAL OUTPT CLINIC VISIT: HCPCS

## 2025-06-27 NOTE — PROGRESS NOTES
"Follow Up Sleep Disorders Center Note     Chief Complaint:  SANA     Primary Care Physician: Harrison Broderick MD    Interval History:   The patient is a 49 y.o. male  who was last seen in the sleep lab: 4/22/2025 for HST which showed AHI 25.2 with lowest SpO2 83%.  Advised auto CPAP 8-20 cm H2O.  Presents today for first follow-up since starting PAP.    Downloaded PAP Data Reviewed For Compliance:  DME is [facility].  Downloads between 5/27/2025 - 6/25/2025.  Average usage is 4 hours 30 minutes.  Average AHI is 0.4.  Average auto CPAP pressure is 11 cm H2O    I have reviewed the above results and compared them with the patient's last downloads and reviewed with the patient.    Review of Systems:    A complete review of systems was done and all were negative with the exception of see scanned media.    Social History:    Social History     Socioeconomic History    Marital status: Single    Number of children: 5   Tobacco Use    Smoking status: Never    Smokeless tobacco: Never   Vaping Use    Vaping status: Never Used   Substance and Sexual Activity    Alcohol use: No    Drug use: No    Sexual activity: Defer       Allergies:  Morphine and codeine     Medication Review:  Reviewed.      Vital Signs:    Vitals:    06/27/25 0903   Pulse: 91   SpO2: 96%   Weight: 105 kg (232 lb)   Height: 167.6 cm (66\")     Body mass index is 37.45 kg/m².    Physical Exam:    Constitutional:  Well developed 49 y.o. male that appears in no apparent distress.  Awake & oriented times 3.  Normal mood with normal recent and remote memory and normal judgement.  Eyes:  Conjunctivae normal.  Oropharynx: Previously, moist mucous membranes.    Self-administered [place] Sleepiness Scale test results:  see scanned media  0-5 Lower normal daytime sleepiness  6-10 Higher normal daytime sleepiness  11-12 Mild, 13-15 Moderate, & 16-24 Severe excessive daytime sleepiness    Impression:   1. Obstructive sleep apnea    2. Morbid obesity  "       Obstructive sleep apnea adequately treated with auto CPAP 8-20 cm H2O with low compliance and usage however improving.  Works afternoon shift and sometimes will have difficulty getting a lot of sleep at night however working on improving this.  The patient has no complaints of hypersomnolence.    Plan:  Good sleep hygiene measures should be maintained.  Weight loss would be beneficial in this patient who is obese by Body mass index is 37.45 kg/m²..      After evaluating the patient and assessing results available, the patient is benefiting from the treatment being provided.     The patient will continue CPAP.  After clinical evaluation and review of downloads, I recommend no changes to the patient's pressures.  A new prescription will be sent to the patient's DME.    Caution during activities that require prolonged concentration is strongly advised if sleepiness returns. Changing of PAP supplies regularly is important for effective use. Patient needs to change cushion on the mask or plugs on nasal pillows along with disposable filters once every month and change mask frame, tubing, headgear and Velcro straps every 6 months at the minimum.    I answered all of the patient's questions.  The patient will call for any problems and will follow up in 6 months.      Ed Dong MD  Sleep Medicine  06/27/25  09:22 EDT

## 2025-07-04 DIAGNOSIS — E78.2 MIXED HYPERLIPIDEMIA: ICD-10-CM

## 2025-07-07 RX ORDER — ROSUVASTATIN CALCIUM 10 MG/1
10 TABLET, COATED ORAL DAILY
Qty: 30 TABLET | Refills: 0 | Status: SHIPPED | OUTPATIENT
Start: 2025-07-07

## 2025-07-11 ENCOUNTER — PATIENT ROUNDING (BHMG ONLY) (OUTPATIENT)
Dept: URGENT CARE | Facility: CLINIC | Age: 50
End: 2025-07-11
Payer: COMMERCIAL

## 2025-07-11 NOTE — ED NOTES
Thank you for letting us care for you during your recent visit to [facility]. We would love to hear about your experience with us.     We’re always looking for ways to make our patients’ experiences even better. Do you have any recommendations on ways we may improve?    I appreciate you taking the time to respond. Please be on the lookout for a survey about your recent visit from Kendall Stanley via text or email. We would greatly appreciate if you could complete the survey. We want your voice to be heard and we value your feedback.     Thank you for choosing [facility] for your healthcare needs.

## 2025-08-04 ENCOUNTER — SPECIALTY PHARMACY (OUTPATIENT)
Dept: ENDOCRINOLOGY | Age: 50
End: 2025-08-04
Payer: COMMERCIAL

## 2025-08-08 ENCOUNTER — TELEPHONE (OUTPATIENT)
Dept: FAMILY MEDICINE CLINIC | Facility: CLINIC | Age: 50
End: 2025-08-08
Payer: COMMERCIAL

## 2025-08-08 DIAGNOSIS — E78.2 MIXED HYPERLIPIDEMIA: ICD-10-CM

## 2025-08-08 RX ORDER — ROSUVASTATIN CALCIUM 10 MG/1
10 TABLET, COATED ORAL DAILY
Qty: 90 TABLET | Refills: 0 | Status: SHIPPED | OUTPATIENT
Start: 2025-08-08

## 2025-08-14 RX ORDER — CETIRIZINE HYDROCHLORIDE 10 MG/1
10 TABLET ORAL DAILY
Qty: 90 TABLET | OUTPATIENT
Start: 2025-08-14

## 2025-08-18 ENCOUNTER — OFFICE VISIT (OUTPATIENT)
Dept: FAMILY MEDICINE CLINIC | Facility: CLINIC | Age: 50
End: 2025-08-18
Payer: COMMERCIAL

## 2025-08-18 VITALS
TEMPERATURE: 98.2 F | HEART RATE: 108 BPM | WEIGHT: 225.8 LBS | HEIGHT: 66 IN | OXYGEN SATURATION: 97 % | SYSTOLIC BLOOD PRESSURE: 122 MMHG | BODY MASS INDEX: 36.29 KG/M2 | RESPIRATION RATE: 18 BRPM | DIASTOLIC BLOOD PRESSURE: 86 MMHG

## 2025-08-18 DIAGNOSIS — S86.912A MUSCLE STRAIN OF LEFT LOWER LEG, INITIAL ENCOUNTER: Primary | ICD-10-CM

## 2025-08-18 PROCEDURE — 99213 OFFICE O/P EST LOW 20 MIN: CPT | Performed by: FAMILY MEDICINE

## 2025-08-18 RX ORDER — TRAMADOL HYDROCHLORIDE 50 MG/1
50 TABLET ORAL NIGHTLY PRN
Qty: 14 TABLET | Refills: 0 | Status: SHIPPED | OUTPATIENT
Start: 2025-08-18

## 2025-08-19 RX ORDER — CETIRIZINE HYDROCHLORIDE 10 MG/1
10 TABLET ORAL NIGHTLY
Qty: 90 TABLET | Refills: 3 | Status: SHIPPED | OUTPATIENT
Start: 2025-08-19

## 2025-08-25 DIAGNOSIS — S86.912A MUSCLE STRAIN OF LEFT LOWER LEG, INITIAL ENCOUNTER: ICD-10-CM

## 2025-08-25 RX ORDER — TRAMADOL HYDROCHLORIDE 50 MG/1
50 TABLET ORAL NIGHTLY PRN
Qty: 14 TABLET | Refills: 0 | OUTPATIENT
Start: 2025-08-25

## 2025-08-28 ENCOUNTER — SPECIALTY PHARMACY (OUTPATIENT)
Dept: ENDOCRINOLOGY | Age: 50
End: 2025-08-28
Payer: COMMERCIAL

## (undated) DEVICE — GLV SURG BIOGEL LTX PF 8 1/2

## (undated) DEVICE — RESUSCITATOR,MANUAL,ADLT,MASK,TUBE RES: Brand: MEDLINE INDUSTRIES, INC.

## (undated) DEVICE — SUT ETHIB 0/0 MO6 I8IN CX45D

## (undated) DEVICE — SUT VIC 3/0 SH 27IN UD VCP416H

## (undated) DEVICE — CANN NASL CO2 TRULINK W/O2 A/

## (undated) DEVICE — SNAR POLYP CAPTIVATOR RND STFF 2.4 240CM 10MM 1P/U

## (undated) DEVICE — MASK,OXY,ADLT,NON-REB,SAFETY VENT,7,UC: Brand: MEDLINE

## (undated) DEVICE — ADAPT CLN BIOGUARD AIR/H2O DISP

## (undated) DEVICE — THE SINGLE USE ETRAP – POLYP TRAP IS USED FOR SUCTION RETRIEVAL OF ENDOSCOPICALLY REMOVED POLYPS.: Brand: ETRAP

## (undated) DEVICE — APPL CHLORAPREP HI/LITE 26ML ORNG

## (undated) DEVICE — DRSNG SURESITE WNDW 4X4.5

## (undated) DEVICE — KT ORCA ORCAPOD DISP STRL

## (undated) DEVICE — PAD GRND E/S MEGADYNE MONOPLR 2/PLT W/CORD A/ DISP

## (undated) DEVICE — BALL COTN 1 1/4IN LG STRL PK/5

## (undated) DEVICE — BITEBLOCK OMNI BLOC

## (undated) DEVICE — STRIP CLS WND CURAD MEDI/STRIP HYPOALLERG 0.5X4IN STRL PK/6

## (undated) DEVICE — MSK PROC CURAPLEX O2 2/ADAPT 7FT

## (undated) DEVICE — LN SMPL CO2 SHTRM SD STREAM W/M LUER

## (undated) DEVICE — FRCP BX RADJAW4 NDL 2.8 240CM LG OG BX40

## (undated) DEVICE — TUBING, SUCTION, 1/4" X 10', STRAIGHT: Brand: MEDLINE

## (undated) DEVICE — SENSR O2 OXIMAX FNGR A/ 18IN NONSTR

## (undated) DEVICE — Device

## (undated) DEVICE — SUT MNCRYL PLS ANTIB UD 4/0 PS2 18IN

## (undated) DEVICE — PK PROC MINOR 40 CA/3

## (undated) DEVICE — SINGLE-USE BIOPSY FORCEPS: Brand: RADIAL JAW 4

## (undated) DEVICE — Device: Brand: DEFENDO AIR/WATER/SUCTION AND BIOPSY VALVE